# Patient Record
Sex: MALE | Race: WHITE | NOT HISPANIC OR LATINO | Employment: FULL TIME | URBAN - METROPOLITAN AREA
[De-identification: names, ages, dates, MRNs, and addresses within clinical notes are randomized per-mention and may not be internally consistent; named-entity substitution may affect disease eponyms.]

---

## 2017-01-05 ENCOUNTER — CLINICAL SUPPORT (OUTPATIENT)
Dept: REHABILITATION | Facility: HOSPITAL | Age: 47
End: 2017-01-05
Attending: FAMILY MEDICINE
Payer: COMMERCIAL

## 2017-01-05 DIAGNOSIS — M25.562 CHRONIC PAIN OF LEFT KNEE: Primary | ICD-10-CM

## 2017-01-05 DIAGNOSIS — G89.29 CHRONIC PAIN OF LEFT KNEE: Primary | ICD-10-CM

## 2017-01-05 PROCEDURE — 97110 THERAPEUTIC EXERCISES: CPT

## 2017-01-05 NOTE — PROGRESS NOTES
PT knee treatment   Physician:Dr. Hamilton/ Dr. Bills  Diagnosis: L shoulder pain   Encounter Diagnosis   Name Primary?    Left knee pain Yes      DOS/ PROCEDURE: 3/9/16  PROCEDURES:   1. Left knee anterior cruciate ligament reconstruction with ipsilateral bone-patellar tendon-bone autograft  Orders:  Physical Therapy evaluate and treat  Date of eval:  3/11/16, shoulder 4/12/16  DOS 3/9/16  Subjective:  Pt is w/o c/o knee pn, again states that it still just feels a little weak . Reports not doing hep consistently but then states he does. Not using stim at home.  Work:                                  Pts goals:  R/t soccer and work without shoulder pain.      OBJECTIVE:    ROM:     11/4  lachman's / ant drawer L neg  clarks + L  ITB tightness, lat patellar tilt  L > R quad weakness    12/ 13 Flex L 145 AROM , R 142   Ext L - 1      7/19  MMT:  Shoulder er  R 5, L     Hip abd L 5   Hip ext 5 ( tight hip flexors)    12/13 Biodex test 180deg/ sec QS L  72 % D        HS 16% D    300 deg/ sec QS 55 % D        HS + 40 % S    Peak Tq/ BW  180 deg/ sec Quads R 63,  L 17  Normal Men 180 deg/ sec: 65- 75  Normal Women 180 deg/ sec : 50- 60    Qs/ HS ratio R 50,  L  152  Normal 66- 76      TREATMENT:  Pt performed LE rom and strengthening as tolerated, including:   stat bike 10 min NP  Medial mcconnel patellar taping with pt reporting slight improvement in sx's and improved ability to contract quads  ellip 10 min     biodex retest np  jogging on turf 3 laps   HSS 2 min B    gss incline 2 min   Quad stretch NP  itb foam roll 15x np  Lat step mtb 60 ft   np  wall squat 3x fatigue    He sls  30x  10#   U squat 30x  NP  Hip flex 2.5 plates 30x  B leg press 140# 30x  calf press 140# 30x  U leg press 100# 30x  U lunge bench 30x  With ed to avoid trunk flexion NP  Lunge walk 2 lap    laq 30x [id number]  Hs hammer 30 # 30x    pilates chair hip step-up 30x 3 spg - ed to avoid using UEs  NP  Lat step ups 3 in 3 x f NP    prone ext hang  5 min # 5 NP  Bridge march 10 sec x 10 U  NP  Star lunge walk 1 lap OTB NP  Foam roll B ITB, HS, quad 15 each  PROM, patella/jt mobs x  10  min        Russian quad stim 10/20,  10 min with pt ed to activate quad with stim. NP      ASSESSMENT: Pt beryl tx with progression of ther ex well. no c/o pn.  Decreased strength L quad ,.seemed to have improved ability to activate quads and no pn with return to limited jogging. Tight L ITB.  Medical necessity is demonstrated by the following  IMPAIRMENTS:  Pain limits function of effected part for some activities, Unable to participate fully in daily activities, Requires skilled supervision to complete and progress HEP, Weakness and Edema    GOALS:   40_   weeks. Pt agrees with goals set  1. Independent with HEP.  2. Report decreased    L knee    pain  <   / =  2  /10 with adls such as jogging, walking, soccer (met for walking)  3. Increased MMT  for  L LE to 5/5    4. Increased arom  for  L knee wnl (met for flex)    5. Independent with HEP shoulder (MET)  6. Report decreased    L shoulder   pain  <   / =  3  /10 with adls such as sleeping, abduction arom/ working (MET)  7. Increased MMT  for  L shoulder   To 5/5 (met except mid traps)      PLAN:  Outpatient physical therapy 1- 2 times weekly to include: pt ed, hep, therapeutic exercises, neuromuscular re-education/ balance exercises, joint mobilizations, modalities prn, and aquatic therapy.    Pt may see PTA as part of treatment plan.  Cont PT for 9  weeks.

## 2017-01-10 ENCOUNTER — CLINICAL SUPPORT (OUTPATIENT)
Dept: REHABILITATION | Facility: HOSPITAL | Age: 47
End: 2017-01-10
Attending: FAMILY MEDICINE
Payer: COMMERCIAL

## 2017-01-10 DIAGNOSIS — G89.29 CHRONIC PAIN OF LEFT KNEE: Primary | ICD-10-CM

## 2017-01-10 DIAGNOSIS — M25.562 CHRONIC PAIN OF LEFT KNEE: Primary | ICD-10-CM

## 2017-01-10 PROCEDURE — 97110 THERAPEUTIC EXERCISES: CPT

## 2017-01-10 NOTE — PROGRESS NOTES
PT knee treatment   Physician:Dr. Hamilton/ Dr. Bills  Diagnosis: L shoulder pain   Encounter Diagnosis   Name Primary?    Left knee pain Yes      DOS/ PROCEDURE: 3/9/16  PROCEDURES:   1. Left knee anterior cruciate ligament reconstruction with ipsilateral bone-patellar tendon-bone autograft  Orders:  Physical Therapy evaluate and treat  Date of eval:  3/11/16, shoulder 4/12/16  DOS 3/9/16  Subjective:  Pt is w/o c/o knee pn, reports using stim at home.  Work:                                  Pts goals:  R/t soccer and work without shoulder pain.      OBJECTIVE:    ROM:     11/4  lachman's / ant drawer L neg  clarks + L  ITB tightness, lat patellar tilt  L > R quad weakness    12/ 13 Flex L 145 AROM , R 142   Ext L - 1      7/19  MMT:  Shoulder er  R 5, L     Hip abd L 5   Hip ext 5 ( tight hip flexors)    12/13 Biodex test 180deg/ sec QS L  72 % D        HS 16% D    300 deg/ sec QS 55 % D        HS + 40 % S    Peak Tq/ BW  180 deg/ sec Quads R 63,  L 17  Normal Men 180 deg/ sec: 65- 75  Normal Women 180 deg/ sec : 50- 60    Qs/ HS ratio R 50,  L  152  Normal 66- 76      TREATMENT:  Pt performed LE rom and strengthening as tolerated, including:   stat bike 10 min   Medial mcconnel patellar taping with pt reporting slight improvement in sx's and improved ability to contract quads  ellip 10 min   NP  biodex retest np  jogging on turf 6 laps   HSS 2 min B    gss incline 2 min   Quad stretch NP  itb foam roll 15x np  Lat step mtb 60 ft   np  wall squat 3x fatigue    He sls  30x  10#   U squat 30x  with sports cord  Hip flex 3 plates 30x  B leg press 140# 30x  calf press 140# 30x  U leg press 100# 30x  U lunge bench 30x  With ed to avoid trunk flexion NP  Lunge walk 2 lap  NP  laq 30x [id number]#  Hs hammer 35 # 30x    pilates chair hip step-up 30x 3 spg - ed to avoid using UEs    Lat step ups 3 in 3 x f NP   prone ext hang  5 min # 5 NP  Bridge march 10 sec x 10 U  NP  Star lunge walk 1 lap OTB NP  Foam  roll B ITB, HS, quad 15 each  PROM, patella/jt mobs x  10  min NP       Russian quad stim 10/20,  10 min with pt ed to activate quad with stim. NP      ASSESSMENT: Pt beryl tx with progression of ther ex well. no c/o pn.  Decreased strength L quad ,.seemed to have improved ability to activate quads and no pn with return to limited jogging. Tight L ITB.  Medical necessity is demonstrated by the following  IMPAIRMENTS:  Pain limits function of effected part for some activities, Unable to participate fully in daily activities, Requires skilled supervision to complete and progress HEP, Weakness and Edema    GOALS:   40_   weeks. Pt agrees with goals set  1. Independent with HEP.  2. Report decreased    L knee    pain  <   / =  2  /10 with adls such as jogging, walking, soccer (met for walking)  3. Increased MMT  for  L LE to 5/5    4. Increased arom  for  L knee wnl (met for flex)    5. Independent with HEP shoulder (MET)  6. Report decreased    L shoulder   pain  <   / =  3  /10 with adls such as sleeping, abduction arom/ working (MET)  7. Increased MMT  for  L shoulder   To 5/5 (met except mid traps)      PLAN:  Outpatient physical therapy 1- 2 times weekly to include: pt ed, hep, therapeutic exercises, neuromuscular re-education/ balance exercises, joint mobilizations, modalities prn, and aquatic therapy.    Pt may see PTA as part of treatment plan.  Cont PT for 9  weeks.

## 2017-01-12 ENCOUNTER — CLINICAL SUPPORT (OUTPATIENT)
Dept: REHABILITATION | Facility: HOSPITAL | Age: 47
End: 2017-01-12
Attending: FAMILY MEDICINE
Payer: COMMERCIAL

## 2017-01-12 DIAGNOSIS — G89.29 CHRONIC PAIN OF LEFT KNEE: Primary | ICD-10-CM

## 2017-01-12 DIAGNOSIS — M25.562 CHRONIC PAIN OF LEFT KNEE: Primary | ICD-10-CM

## 2017-01-12 PROCEDURE — 97110 THERAPEUTIC EXERCISES: CPT

## 2017-01-12 NOTE — PROGRESS NOTES
PT knee treatment   Physician:Dr. Hamilton/ Dr. Bills  Diagnosis: L shoulder pain   Encounter Diagnosis   Name Primary?    Left knee pain Yes      DOS/ PROCEDURE: 3/9/16  PROCEDURES:   1. Left knee anterior cruciate ligament reconstruction with ipsilateral bone-patellar tendon-bone autograft  Orders:  Physical Therapy evaluate and treat  Date of eval:  3/11/16, shoulder 4/12/16  DOS 3/9/16  Subjective:  Pt is w/o c/o knee pn, reports using stim at home.  Work:                                  Pts goals:  R/t soccer and work without shoulder pain.      OBJECTIVE:    ROM:     11/4  lachman's / ant drawer L neg  clarks + L  ITB tightness, lat patellar tilt  L > R quad weakness    12/ 13 Flex L 145 AROM , R 142   Ext L - 1      7/19  MMT:  Shoulder er  R 5, L     Hip abd L 5   Hip ext 5 ( tight hip flexors)    12/13 Biodex test 180deg/ sec QS L  72 % D        HS 16% D    300 deg/ sec QS 55 % D        HS + 40 % S    Peak Tq/ BW  180 deg/ sec Quads R 63,  L 17  Normal Men 180 deg/ sec: 65- 75  Normal Women 180 deg/ sec : 50- 60    Qs/ HS ratio R 50,  L  152  Normal 66- 76      TREATMENT:  Pt performed LE rom and strengthening as tolerated, including:   stat bike 10 min   Medial mcconnel patellar taping with pt reporting slight improvement in sx's and improved ability to contract quads  ellip 10 min     biodex retest np  jogging on turf 8 laps   HSS 2 min B    gss incline 2 min   Quad stretch NP  itb foam roll 15x np  Lat step mtb 60 ft   np  wall squat 3x fatigue    He sls  30x  10# NP  U squat 30x  with sports cord NP  Hip flex 3 plates 30x  B leg press 140# 30x  calf press 140# 30x  U leg press 100# 30x  U lunge bench 30x  With ed to avoid trunk flexion NP  Lunge walk 2 lap    laq 30x [id number]#  Hs hammer 35 # 30x    pilates chair hip step-up 30x 2 spg - ed to avoid using UEs   Plyo shuttle 1 band 2 up 1 down 30x   Lat step ups 3 in 3 x f NP   prone ext hang  5 min # 5 NP  Bridge march 10 sec x 10 U   NP  Star lunge walk 1 lap OTB NP  Foam roll B ITB, HS, quad 15 each  PROM, patella/jt mobs x  10  min NP       Russian quad stim 10/20,  10 min with pt ed to activate quad with stim. NP      ASSESSMENT: Pt beryl tx with progression of ther ex well. no c/o pn.  Decreased strength L quad ,.seemed to have improved ability to activate quads and no pn with return to limited jogging. Tight L ITB.  Medical necessity is demonstrated by the following  IMPAIRMENTS:  Pain limits function of effected part for some activities, Unable to participate fully in daily activities, Requires skilled supervision to complete and progress HEP, Weakness and Edema    GOALS:   40_   weeks. Pt agrees with goals set  1. Independent with HEP.  2. Report decreased    L knee    pain  <   / =  2  /10 with adls such as jogging, walking, soccer (met for walking)  3. Increased MMT  for  L LE to 5/5    4. Increased arom  for  L knee wnl (met for flex)    5. Independent with HEP shoulder (MET)  6. Report decreased    L shoulder   pain  <   / =  3  /10 with adls such as sleeping, abduction arom/ working (MET)  7. Increased MMT  for  L shoulder   To 5/5 (met except mid traps)      PLAN:  Outpatient physical therapy 1- 2 times weekly to include: pt ed, hep, therapeutic exercises, neuromuscular re-education/ balance exercises, joint mobilizations, modalities prn, and aquatic therapy.    Pt may see PTA as part of treatment plan.  Cont PT for 9  weeks.

## 2017-01-17 ENCOUNTER — CLINICAL SUPPORT (OUTPATIENT)
Dept: REHABILITATION | Facility: HOSPITAL | Age: 47
End: 2017-01-17
Attending: FAMILY MEDICINE
Payer: COMMERCIAL

## 2017-01-17 DIAGNOSIS — G89.29 CHRONIC PAIN OF LEFT KNEE: Primary | ICD-10-CM

## 2017-01-17 DIAGNOSIS — M25.562 CHRONIC PAIN OF LEFT KNEE: Primary | ICD-10-CM

## 2017-01-17 PROCEDURE — 97110 THERAPEUTIC EXERCISES: CPT

## 2017-01-17 NOTE — PROGRESS NOTES
PT knee treatment   Physician:Dr. Hamilton/ Dr. Bills  Diagnosis: L shoulder pain   Encounter Diagnosis   Name Primary?    Left knee pain Yes      DOS/ PROCEDURE: 3/9/16  PROCEDURES:   1. Left knee anterior cruciate ligament reconstruction with ipsilateral bone-patellar tendon-bone autograft  Orders:  Physical Therapy evaluate and treat  Date of eval:  3/11/16, shoulder 4/12/16  DOS 3/9/16  Subjective:  Pt is w/o c/o knee pn, reports using stim at home.  Work:                                  Pts goals:  R/t soccer and work without shoulder pain.      OBJECTIVE:    ROM:     11/4  lachman's / ant drawer L neg  clarks + L  ITB tightness, lat patellar tilt  L > R quad weakness    12/ 13 Flex L 145 AROM , R 142   Ext L - 1      7/19  MMT:  Shoulder er  R 5, L     Hip abd L 5   Hip ext 5 ( tight hip flexors)    12/13 Biodex test 180deg/ sec QS L  72 % D        HS 16% D    300 deg/ sec QS 55 % D        HS + 40 % S    Peak Tq/ BW  180 deg/ sec Quads R 63,  L 17  Normal Men 180 deg/ sec: 65- 75  Normal Women 180 deg/ sec : 50- 60    Qs/ HS ratio R 50,  L  152  Normal 66- 76      TREATMENT:  Pt performed LE rom and strengthening as tolerated, including:   stat bike 10 min NP  ellip 10 min     biodex retest np  jogging onTM 3 min with 1 min warm-up,  2 min cool down  HSS 2 min B    gss incline 2 min   Quad stretch NP  itb foam roll 15x np  Lat step mtb 60 ft   np  wall squat 3x fatigue    He sls  30x  10# NP  U squat 30x  with sports cord NP  Hip flex 3 plates 30x  B leg press 140# 30x  calf press 140# 30x  U leg press 100# 30x  U lunge bench 30x  With ed to avoid trunk flexion NP  Lunge walk 2 lap    laq 30x [id number]#  Hs hammer 35 # 30x    pilates chair hip step-up 30x 2 spg - ed to avoid using UEs   Plyo shuttle 1 band 2 up 1 down 30x   Lat step ups 3 in 3 x f NP   prone ext hang  5 min # 5 NP  Bridge march 10 sec x 10 U  NP  Star lunge walk 1 lap OTB NP  Foam roll B ITB, HS, quad 15 each  PROM, patella/jt  mobs x  10  min NP       Russian quad stim 10/20,  10 min with pt ed to activate quad with stim. NP      ASSESSMENT: Pt beryl tx with progression of ther ex well. no c/o pn. Beryl jogging on TM well with no adverse effects during or post.  Decreased strength L quad ,.seemed to have improved ability to activate quads and no pn with return to limited jogging. Tight L ITB.  Medical necessity is demonstrated by the following  IMPAIRMENTS:  Pain limits function of effected part for some activities, Unable to participate fully in daily activities, Requires skilled supervision to complete and progress HEP, Weakness and Edema    GOALS:   40_   weeks. Pt agrees with goals set  1. Independent with HEP.  2. Report decreased    L knee    pain  <   / =  2  /10 with adls such as jogging, walking, soccer (met for walking)  3. Increased MMT  for  L LE to 5/5    4. Increased arom  for  L knee wnl (met for flex)    5. Independent with HEP shoulder (MET)  6. Report decreased    L shoulder   pain  <   / =  3  /10 with adls such as sleeping, abduction arom/ working (MET)  7. Increased MMT  for  L shoulder   To 5/5 (met except mid traps)      PLAN:  Outpatient physical therapy 1- 2 times weekly to include: pt ed, hep, therapeutic exercises, neuromuscular re-education/ balance exercises, joint mobilizations, modalities prn, and aquatic therapy.    Pt may see PTA as part of treatment plan.  Cont PT for 9  weeks.

## 2017-01-19 ENCOUNTER — CLINICAL SUPPORT (OUTPATIENT)
Dept: REHABILITATION | Facility: HOSPITAL | Age: 47
End: 2017-01-19
Attending: FAMILY MEDICINE
Payer: COMMERCIAL

## 2017-01-19 DIAGNOSIS — M25.562 CHRONIC PAIN OF LEFT KNEE: Primary | ICD-10-CM

## 2017-01-19 DIAGNOSIS — G89.29 CHRONIC PAIN OF LEFT KNEE: Primary | ICD-10-CM

## 2017-01-19 PROCEDURE — 97110 THERAPEUTIC EXERCISES: CPT

## 2017-01-19 NOTE — PROGRESS NOTES
PT knee treatment   Physician:Dr. Hamilton/ Dr. Bills  Diagnosis: L shoulder pain   Encounter Diagnosis   Name Primary?    Left knee pain Yes      DOS/ PROCEDURE: 3/9/16  PROCEDURES:   1. Left knee anterior cruciate ligament reconstruction with ipsilateral bone-patellar tendon-bone autograft  Orders:  Physical Therapy evaluate and treat  Date of eval:  3/11/16, shoulder 4/12/16  DOS 3/9/16  Subjective:  Pt is w/o c/o knee pn, reports using stim at home.  Work:                                  Pts goals:  R/t soccer and work without shoulder pain.      OBJECTIVE:    ROM:     11/4  lachman's / ant drawer L neg  clarks + L  ITB tightness, lat patellar tilt  L > R quad weakness    12/ 13 Flex L 145 AROM , R 142   Ext L - 1      7/19  MMT:  Shoulder er  R 5, L     Hip abd L 5   Hip ext 5 ( tight hip flexors)    12/13 Biodex test 180deg/ sec QS L  72 % D        HS 16% D    300 deg/ sec QS 55 % D        HS + 40 % S    Peak Tq/ BW  180 deg/ sec Quads R 63,  L 17  Normal Men 180 deg/ sec: 65- 75  Normal Women 180 deg/ sec : 50- 60    Qs/ HS ratio R 50,  L  152  Normal 66- 76      TREATMENT:  Pt performed LE rom and strengthening as tolerated, including:   stat bike 10 min NP  ellip 10 min     biodex retest np  jogging onTM 3 min with 2 min warm-up,  2 min cool down  HSS 2 min B    gss incline 2 min   Quad stretch NP  itb foam roll 15x np  Lat step mtb 60 ft   np  wall squat 3x fatigue    He sls  30x  10# NP  TKE 20# 30x  U squat 30x  with sports cord NP  Hip flex 3.5 plates 30x  B leg press 140# 30x NP  calf press 140# 30x NP  U leg press 100# 30x NP  U lunge bench 30x  With ed to avoid trunk flexion NP  Lunge walk 2 lap  with ball  laq 30x [id number]  Hs hammer 35 # 30x    pilates chair hip step-up 30x 2 spg - ed to avoid using UEs   Plyo shuttle 2 band 2 up 1 down 30x   Lat step ups 3 in 3 x f NP   prone ext hang  5 min # 5 NP  Bridge march 10 sec x 10 U  NP  Star lunge walk 1 lap OTB NP  Foam roll B ITB, HS,  quad 15 each NP  PROM, patella/jt mobs x  10  min NP       Russian quad stim 10/20,  10 min with pt ed to activate quad with stim. NP      ASSESSMENT: Pt beryl tx with progression of ther ex well. no c/o pn. Beryl jogging on TM well with no adverse effects during or post.  Decreased strength L quad ,.seemed to have improved ability to activate quads and no pn with return to jogging and plyo exs today. Tight L ITB.  Medical necessity is demonstrated by the following  IMPAIRMENTS:  Pain limits function of effected part for some activities, Unable to participate fully in daily activities, Requires skilled supervision to complete and progress HEP, Weakness and Edema    GOALS:   40_   weeks. Pt agrees with goals set  1. Independent with HEP.  2. Report decreased    L knee    pain  <   / =  2  /10 with adls such as jogging, walking, soccer (met for walking)  3. Increased MMT  for  L LE to 5/5    4. Increased arom  for  L knee wnl (met for flex)    5. Independent with HEP shoulder (MET)  6. Report decreased    L shoulder   pain  <   / =  3  /10 with adls such as sleeping, abduction arom/ working (MET)  7. Increased MMT  for  L shoulder   To 5/5 (met except mid traps)      PLAN:  Outpatient physical therapy 1- 2 times weekly to include: pt ed, hep, therapeutic exercises, neuromuscular re-education/ balance exercises, joint mobilizations, modalities prn, and aquatic therapy.    Pt may see PTA as part of treatment plan.  Cont PT for 9  weeks.

## 2017-01-24 ENCOUNTER — CLINICAL SUPPORT (OUTPATIENT)
Dept: REHABILITATION | Facility: HOSPITAL | Age: 47
End: 2017-01-24
Attending: FAMILY MEDICINE
Payer: COMMERCIAL

## 2017-01-24 DIAGNOSIS — M25.562 CHRONIC PAIN OF LEFT KNEE: Primary | ICD-10-CM

## 2017-01-24 DIAGNOSIS — G89.29 CHRONIC PAIN OF LEFT KNEE: Primary | ICD-10-CM

## 2017-01-24 PROCEDURE — 97750 PHYSICAL PERFORMANCE TEST: CPT | Performed by: INTERNAL MEDICINE

## 2017-01-24 PROCEDURE — 97110 THERAPEUTIC EXERCISES: CPT | Performed by: INTERNAL MEDICINE

## 2017-01-24 NOTE — PROGRESS NOTES
PT knee treatment   Physician:Dr. Hamilton/ Dr. Bills  Diagnosis: L shoulder pain   Encounter Diagnosis   Name Primary?    Left knee pain Yes      DOS/ PROCEDURE: 3/9/16  PROCEDURES:   1. Left knee anterior cruciate ligament reconstruction with ipsilateral bone-patellar tendon-bone autograft  Orders:  Physical Therapy evaluate and treat  Date of eval:  3/11/16, shoulder 4/12/16  DOS 3/9/16  Subjective:  Pt is w/o c/o knee pn, feels like quad strength is improving.  Work:                                  Pts goals:  R/t soccer and work without shoulder pain.      OBJECTIVE:    ROM:     11/4  lachman's / ant drawer L neg  clarks + L  ITB tightness, lat patellar tilt  L > R quad weakness    12/ 13 Flex L 145 AROM , R 142   Ext L - 1      7/19  MMT:  Shoulder er  R 5, L     Hip abd L 5   Hip ext 5 ( tight hip flexors)    1/24 Biodex test 180deg/ sec QS L  64% D        HS 6% D    300 deg/ sec QS 45 % D        HS + 34 % S    Peak Tq/ BW  180 deg/ sec Quads R 50,  L 18  Normal Men 180 deg/ sec: 65- 75  Normal Women 180 deg/ sec : 50- 60    Qs/ HS ratio R 56,  L  151  Normal 66- 76      TREATMENT:  Pt performed LE rom and strengthening as tolerated, including:   stat bike 10 min   ellip 10 min   np  biodex retest   jogging onTM 3 min with 2 min warm-up,  2 min cool down  HSS 2 min B    gss incline 2 min   Quad stretch   wall squat 3x fatigue  Heel down  He sls  30x  10# NP  TKE 40# 30x  U squat 30x  with sports cord NP  Hip flex 3.5 plates 30x np  U leg press 100# 30x NP  U lunge bench 30x  With ed to avoid trunk flexion 10 # B  Lunge walk 2 lap  with ball np  laq 30x [id number]  Hs hammer 35 # 30x  np  pilates chair hip step-up 30x 2 spg - ed to avoid using UEs  np  Plyo shuttle 2 band 2 up 1 down 30x np  Lat step ups 4 in 3 x f    prone ext hang  5 min # 5 NP  Bridge march 10 sec x 10 U  NP  Foam roll B ITB, HS, quad 15 each        Ed in metronome 180 bpm and to cont jogging < 10 min as beryl due to cont quad  weakness. Ed to cont to avoid sports.    ASSESSMENT: Pt beryl tx with progression of ther ex well. no c/o pn. Improved L quad strength/ endurance but remains very weak. Medical necessity is demonstrated by the following  IMPAIRMENTS:  Pain limits function of effected part for some activities, Unable to participate fully in daily activities, Requires skilled supervision to complete and progress HEP, Weakness and Edema    GOALS:   40_   weeks. Pt agrees with goals set  1. Independent with HEP.  2. Report decreased    L knee    pain  <   / =  2  /10 with adls such as jogging, walking, soccer (met for walking)  3. Increased MMT  for  L LE to 5/5    4. Increased arom  for  L knee wnl (met for flex)    5. Independent with HEP shoulder (MET)  6. Report decreased    L shoulder   pain  <   / =  3  /10 with adls such as sleeping, abduction arom/ working (MET)  7. Increased MMT  for  L shoulder   To 5/5 (met except mid traps)  8. Biodex L quad strength improved to 20 % Deficit      PLAN:  Outpatient physical therapy 1- 2 times weekly to include: pt ed, hep, therapeutic exercises, neuromuscular re-education/ balance exercises, joint mobilizations, modalities prn, and aquatic therapy.    Pt may see PTA as part of treatment plan.  Cont PT for 15  weeks.     PT/PTA met face to face to discuss patient's treatment plan and progress towards established goals.  Treatment will be continued as described in initial report/eval and progress notes.  Patient will be seen by physical therapist every sixth visit and minimally once per month.        Robert MENDOZA

## 2017-01-31 ENCOUNTER — CLINICAL SUPPORT (OUTPATIENT)
Dept: REHABILITATION | Facility: HOSPITAL | Age: 47
End: 2017-01-31
Attending: FAMILY MEDICINE
Payer: COMMERCIAL

## 2017-01-31 DIAGNOSIS — G89.29 CHRONIC PAIN OF LEFT KNEE: Primary | ICD-10-CM

## 2017-01-31 DIAGNOSIS — M25.562 CHRONIC PAIN OF LEFT KNEE: Primary | ICD-10-CM

## 2017-01-31 PROCEDURE — 97110 THERAPEUTIC EXERCISES: CPT

## 2017-01-31 NOTE — PROGRESS NOTES
PT knee treatment   Physician:Dr. Hamilton/ Dr. Bills  Diagnosis: L shoulder pain   Encounter Diagnosis   Name Primary?    Left knee pain Yes      DOS/ PROCEDURE: 3/9/16  PROCEDURES:   1. Left knee anterior cruciate ligament reconstruction with ipsilateral bone-patellar tendon-bone autograft  Orders:  Physical Therapy evaluate and treat  Date of eval:  3/11/16, shoulder 4/12/16  DOS 3/9/16  Subjective:  Pt is w/o c/o knee pn, reports using stim at home.  Work:                                  Pts goals:  R/t soccer and work without shoulder pain.      OBJECTIVE:    ROM:     11/4  lachman's / ant drawer L neg  clarks + L  ITB tightness, lat patellar tilt  L > R quad weakness    12/ 13 Flex L 145 AROM , R 142   Ext L - 1      7/19  MMT:  Shoulder er  R 5, L     Hip abd L 5   Hip ext 5 ( tight hip flexors)    12/13 Biodex test 180deg/ sec QS L  72 % D        HS 16% D    300 deg/ sec QS 55 % D        HS + 40 % S    Peak Tq/ BW  180 deg/ sec Quads R 63,  L 17  Normal Men 180 deg/ sec: 65- 75  Normal Women 180 deg/ sec : 50- 60    Qs/ HS ratio R 50,  L  152  Normal 66- 76      TREATMENT:  Pt performed LE rom and strengthening as tolerated, including:   stat bike 10 min NP  ellip 10 min     biodex retest np  jogging onTM 3 min with 2 min warm-up,  2 min cool down  HSS 2 min B    gss incline 2 min   Quad stretch NP  itb foam roll 15x np  Lat step mtb 60 ft     wall squat 3x fatigue    He sls  30x  10# NP  TKE 20# 30x  U squat 30x  with sports cord NP  Hip flex 3.5 plates 30x  B leg press 140# 30x NP  calf press 140# 30x NP  U leg press 100# 30x NP  U lunge bench 30x  With ed to avoid trunk flexion NP  Lunge walk 2 lap  with ball  laq 30x [id number]  Hs hammer 30 # 30x    pilates chair hip step-up 30x 2 spg - ed to avoid using UEs NP  Plyo shuttle 1 band 2 up 1 down 30x   Lat step ups 3 in 3 x f NP   prone ext hang  5 min # 5 NP  Bridge march 10 sec x 10 U  NP  Star lunge walk 1 lap OTB NP  Foam roll B ITB, HS,  quad 15 each NP  PROM, patella/jt mobs x  10  min NP       Russian quad stim 10/20,  10 min with pt ed to activate quad with stim. NP      ASSESSMENT: Pt beryl tx with progression of ther ex well. no c/o pn. Beryl jogging on TM well with no adverse effects during or post.  Decreased strength L quad ,.seemed to have improved ability to activate quads and no pn with return to jogging and plyo exs today. Tight L ITB.  Medical necessity is demonstrated by the following  IMPAIRMENTS:  Pain limits function of effected part for some activities, Unable to participate fully in daily activities, Requires skilled supervision to complete and progress HEP, Weakness and Edema    GOALS:   40_   weeks. Pt agrees with goals set  1. Independent with HEP.  2. Report decreased    L knee    pain  <   / =  2  /10 with adls such as jogging, walking, soccer (met for walking)  3. Increased MMT  for  L LE to 5/5    4. Increased arom  for  L knee wnl (met for flex)    5. Independent with HEP shoulder (MET)  6. Report decreased    L shoulder   pain  <   / =  3  /10 with adls such as sleeping, abduction arom/ working (MET)  7. Increased MMT  for  L shoulder   To 5/5 (met except mid traps)      PLAN:  Outpatient physical therapy 1- 2 times weekly to include: pt ed, hep, therapeutic exercises, neuromuscular re-education/ balance exercises, joint mobilizations, modalities prn, and aquatic therapy.    Pt may see PTA as part of treatment plan.  Cont PT for 9  weeks.

## 2017-02-07 ENCOUNTER — CLINICAL SUPPORT (OUTPATIENT)
Dept: REHABILITATION | Facility: HOSPITAL | Age: 47
End: 2017-02-07
Attending: FAMILY MEDICINE
Payer: COMMERCIAL

## 2017-02-07 DIAGNOSIS — M25.562 CHRONIC PAIN OF LEFT KNEE: Primary | ICD-10-CM

## 2017-02-07 DIAGNOSIS — G89.29 CHRONIC PAIN OF LEFT KNEE: Primary | ICD-10-CM

## 2017-02-07 PROCEDURE — 97110 THERAPEUTIC EXERCISES: CPT

## 2017-02-07 NOTE — PROGRESS NOTES
PT knee treatment   Physician:Dr. Hamilton/ Dr. Bills  Diagnosis: L shoulder pain   Encounter Diagnosis   Name Primary?    Left knee pain Yes      DOS/ PROCEDURE: 3/9/16  PROCEDURES:   1. Left knee anterior cruciate ligament reconstruction with ipsilateral bone-patellar tendon-bone autograft  Orders:  Physical Therapy evaluate and treat  Date of eval:  3/11/16, shoulder 4/12/16  DOS 3/9/16  Subjective:  Pt is w/o c/o knee pn,.  Work:                                  Pts goals:  R/t soccer and work without shoulder pain.      OBJECTIVE:    ROM:     11/4  lachman's / ant drawer L neg  clarks + L  ITB tightness, lat patellar tilt  L > R quad weakness    12/ 13 Flex L 145 AROM , R 142   Ext L - 1      7/19  MMT:  Shoulder er  R 5, L     Hip abd L 5   Hip ext 5 ( tight hip flexors)    12/13 Biodex test 180deg/ sec QS L  72 % D        HS 16% D    300 deg/ sec QS 55 % D        HS + 40 % S    Peak Tq/ BW  180 deg/ sec Quads R 63,  L 17  Normal Men 180 deg/ sec: 65- 75  Normal Women 180 deg/ sec : 50- 60    Qs/ HS ratio R 50,  L  152  Normal 66- 76      TREATMENT:  Pt performed LE rom and strengthening as tolerated, including:   stat bike 10 min   ellip 10 min   NP  biodex retest np  jogging onTM 4 min with 2 min warm-up,  2 min cool down  HSS 2 min B    gss incline 2 min   Quad stretch NP  itb foam roll 15x np  Lat step mtb 60 ft     wall squat 3x fatigue    He sls  30x  10# NP  TKE 20# 30x  U squat 30x  with sports cord   Hip flex 3.5 plates 30x  B leg press 140# 30x NP  calf press 140# 30x NP  U leg press 100# 30x NP  U lunge bench 30x  With ed to avoid trunk flexion NP  Lunge walk 2 lap  with ball  laq 30x [id number]  Hs hammer 35 # 30x    pilates chair hip step-up 30x 2 spg - ed to avoid using UEs   Plyo shuttle 1 band 2 up 1 down 30x NP  Lat step ups 3 in 3 x 10   prone ext hang  5 min # 5 NP  Bridge march 10 sec x 10 U  NP  Star lunge walk 1 lap OTB NP  Foam roll B ITB, HS, quad 15 each NP  PROM,  patella/jt mobs x  10  min NP       Russian quad stim 10/20,  10 min with pt ed to activate quad with stim. NP      ASSESSMENT: Pt beryl tx with progression of ther ex well. no c/o pn. Ebryl jogging on TM well with no adverse effects during or post.  Decreased strength L quad ,.seemed to have improved ability to activate quads and no pn with return to jogging and plyo exs today. Tight L ITB.  Medical necessity is demonstrated by the following  IMPAIRMENTS:  Pain limits function of effected part for some activities, Unable to participate fully in daily activities, Requires skilled supervision to complete and progress HEP, Weakness and Edema    GOALS:   40_   weeks. Pt agrees with goals set  1. Independent with HEP.  2. Report decreased    L knee    pain  <   / =  2  /10 with adls such as jogging, walking, soccer (met for walking)  3. Increased MMT  for  L LE to 5/5    4. Increased arom  for  L knee wnl (met for flex)    5. Independent with HEP shoulder (MET)  6. Report decreased    L shoulder   pain  <   / =  3  /10 with adls such as sleeping, abduction arom/ working (MET)  7. Increased MMT  for  L shoulder   To 5/5 (met except mid traps)      PLAN:  Outpatient physical therapy 1- 2 times weekly to include: pt ed, hep, therapeutic exercises, neuromuscular re-education/ balance exercises, joint mobilizations, modalities prn, and aquatic therapy.    Pt may see PTA as part of treatment plan.  Cont PT for 9  weeks.

## 2017-02-09 ENCOUNTER — CLINICAL SUPPORT (OUTPATIENT)
Dept: REHABILITATION | Facility: HOSPITAL | Age: 47
End: 2017-02-09
Attending: FAMILY MEDICINE
Payer: COMMERCIAL

## 2017-02-09 DIAGNOSIS — M25.562 CHRONIC PAIN OF LEFT KNEE: Primary | ICD-10-CM

## 2017-02-09 DIAGNOSIS — G89.29 CHRONIC PAIN OF LEFT KNEE: Primary | ICD-10-CM

## 2017-02-09 PROCEDURE — 97110 THERAPEUTIC EXERCISES: CPT

## 2017-02-09 NOTE — PROGRESS NOTES
PT knee treatment   Physician:Dr. Hamilton/ Dr. Bills  Diagnosis: L shoulder pain   Encounter Diagnosis   Name Primary?    Left knee pain Yes      DOS/ PROCEDURE: 3/9/16  PROCEDURES:   1. Left knee anterior cruciate ligament reconstruction with ipsilateral bone-patellar tendon-bone autograft  Orders:  Physical Therapy evaluate and treat  Date of eval:  3/11/16, shoulder 4/12/16  DOS 3/9/16  Subjective:  Pt is w/o c/o knee pn,.  Work:                                  Pts goals:  R/t soccer and work without shoulder pain.      OBJECTIVE:    ROM:     11/4  lachman's / ant drawer L neg  clarks + L  ITB tightness, lat patellar tilt  L > R quad weakness    12/ 13 Flex L 145 AROM , R 142   Ext L - 1      7/19  MMT:  Shoulder er  R 5, L     Hip abd L 5   Hip ext 5 ( tight hip flexors)    12/13 Biodex test 180deg/ sec QS L  72 % D        HS 16% D    300 deg/ sec QS 55 % D        HS + 40 % S    Peak Tq/ BW  180 deg/ sec Quads R 63,  L 17  Normal Men 180 deg/ sec: 65- 75  Normal Women 180 deg/ sec : 50- 60    Qs/ HS ratio R 50,  L  152  Normal 66- 76      TREATMENT:  Pt performed LE rom and strengthening as tolerated, including:   stat bike 10 min NP  ellip 10 min     biodex retest np  jogging onTM 4 min with 2 min warm-up,  2 min cool down NP  HSS 2 min B    gss incline 2 min   Quad stretch NP  itb foam roll 15x np  Lat step mtb 60 ft     wall squat 3x fatigue    He sls  30x  10#   TKE 23# 30x  U squat 30x  with sports cord NP  Hip flex 3.5 plates 30x NP  B leg press 140# 30x   calf press 140# 30x   U leg press 100# 30x   U lunge bench 30x  With ed to avoid trunk flexion NP  Lunge walk 2 lap  with ball NP  laq 30x [id number]  Hs hammer 35 # 30x    pilates chair hip step-up 30x 2 spg - ed to avoid using UEs NP  Plyo shuttle 1 band 2 up 1 down 30x NP  Lat step ups 6 in 3 x 10   prone ext hang  5 min # 5 NP  Bridge march 10 sec x 10 U  NP  Star lunge walk 1 lap OTB NP  Foam roll B ITB, HS, quad 15 each NP  PROM,  patella/jt mobs x  10  min NP       Russian quad stim 10/20,  10 min with pt ed to activate quad with stim. NP      ASSESSMENT: Pt beryl tx with progression of ther ex well. no c/o pn. Beryl jogging on TM well with no adverse effects during or post.  Decreased strength L quad ,.seemed to have improved ability to activate quads and no pn with return to jogging and plyo exs today. Tight L ITB.  Medical necessity is demonstrated by the following  IMPAIRMENTS:  Pain limits function of effected part for some activities, Unable to participate fully in daily activities, Requires skilled supervision to complete and progress HEP, Weakness and Edema    GOALS:   40_   weeks. Pt agrees with goals set  1. Independent with HEP.  2. Report decreased    L knee    pain  <   / =  2  /10 with adls such as jogging, walking, soccer (met for walking)  3. Increased MMT  for  L LE to 5/5    4. Increased arom  for  L knee wnl (met for flex)    5. Independent with HEP shoulder (MET)  6. Report decreased    L shoulder   pain  <   / =  3  /10 with adls such as sleeping, abduction arom/ working (MET)  7. Increased MMT  for  L shoulder   To 5/5 (met except mid traps)      PLAN:  Outpatient physical therapy 1- 2 times weekly to include: pt ed, hep, therapeutic exercises, neuromuscular re-education/ balance exercises, joint mobilizations, modalities prn, and aquatic therapy.    Pt may see PTA as part of treatment plan.  Cont PT for 9  weeks.

## 2017-02-14 ENCOUNTER — CLINICAL SUPPORT (OUTPATIENT)
Dept: REHABILITATION | Facility: HOSPITAL | Age: 47
End: 2017-02-14
Attending: FAMILY MEDICINE
Payer: COMMERCIAL

## 2017-02-14 DIAGNOSIS — M25.562 CHRONIC PAIN OF LEFT KNEE: Primary | ICD-10-CM

## 2017-02-14 DIAGNOSIS — G89.29 CHRONIC PAIN OF LEFT KNEE: Primary | ICD-10-CM

## 2017-02-14 PROCEDURE — 97110 THERAPEUTIC EXERCISES: CPT | Performed by: INTERNAL MEDICINE

## 2017-02-14 PROCEDURE — 97112 NEUROMUSCULAR REEDUCATION: CPT | Performed by: INTERNAL MEDICINE

## 2017-02-14 NOTE — PROGRESS NOTES
PT knee treatment   Physician:Dr. Hamilton/ Dr. Bills  Diagnosis: L shoulder pain   Encounter Diagnosis   Name Primary?    Left knee pain Yes      DOS/ PROCEDURE: 3/9/16  PROCEDURES:   1. Left knee anterior cruciate ligament reconstruction with ipsilateral bone-patellar tendon-bone autograft  Orders:  Physical Therapy evaluate and treat  Date of eval:  3/11/16, shoulder 4/12/16  DOS 3/9/16  Subjective:  Pt is w/o c/o knee pn, states diff doing step ups at home since his stairs are higher.  Work:                                  Pts goals:  R/t soccer and work without shoulder pain.      OBJECTIVE:    1/24 Biodex test 180deg/ sec QS L  64% D        HS 6% D    300 deg/ sec QS 45 % D        HS + 34 % S    Peak Tq/ BW  180 deg/ sec Quads R 50,  L 18  Normal Men 180 deg/ sec: 65- 75  Normal Women 180 deg/ sec : 50- 60    Qs/ HS ratio R 56,  L  151  Normal 66- 76          11/4  lachman's / ant drawer L neg  clarks + L  ITB tightness, lat patellar tilt  L > R quad weakness    2/14   Ext L + 2, R + 4    flex B knee arom wnl    Hip abd L 5   Hip ext 5 ( tight hip flexors)      TREATMENT:  Pt performed LE rom and strengthening as tolerated, including:   stat bike 10 min   ellip 10 min   np  biodex retest np  jogging onTM 4 min with 2 min warm-up,  2 min cool down NP  HSS 2 min B    gss incline 2 min   Prone flex strap 2 min  itb foam roll 15x np  Lat step mtb 70 ft     wall squat 3x fatigue    He sls  30x  10# np  TKE 23# 30x  U squat 30x  with sports cord NP  U leg press 100# 30x   U lunge bench 30x  With ed to avoid trunk flexion 5# each UE  Lunge walk 2 lap  with ball NP  laq 30x [id number]  Hs hammer 35 # 30x    pilates chair hip step-up 30x 2 spg - ed to avoid using UEs NP  Plyo shuttle 1 LE jump 15 x 2  Lat step ups 6 in 3 x 10   prone ext hang  5 min # 5  Bridge march 10 sec x 10 U  NP  Kneeling knee flex stre 20 sec x 3    Foam roll B ITB, HS, quad 15 each NP  PROM, patella/jt mobs x  10  min NP     Ed to  add 2 -3 in book at bottom of steps to make his step ups lower. Pt verbalized  understanding.     ASSESSMENT: Seems to be progressing with quad strength.     Medical necessity is demonstrated by the following  IMPAIRMENTS:  Pain limits function of effected part for some activities, Unable to participate fully in daily activities, Requires skilled supervision to complete and progress HEP, Weakness and Edema    GOALS:   40_   weeks. Pt agrees with goals set  1. Independent with HEP.  2. Report decreased    L knee    pain  <   / =  2  /10 with adls such as jogging, walking, soccer (met for walking)  3. Increased MMT  for  L LE to 5/5    4. Increased arom  for  L knee wnl (met for flex)    5. Independent with HEP shoulder (MET)  6. Report decreased    L shoulder   pain  <   / =  3  /10 with adls such as sleeping, abduction arom/ working (MET)  7. Increased MMT  for  L shoulder   To 5/5 (met except mid traps)      PLAN:  Outpatient physical therapy 1- 2 times weekly to include: pt ed, hep, therapeutic exercises, neuromuscular re-education/ balance exercises, joint mobilizations, modalities prn, and aquatic therapy.    Pt may see PTA as part of treatment plan.  Cont PT for 9  weeks.

## 2017-02-16 ENCOUNTER — CLINICAL SUPPORT (OUTPATIENT)
Dept: REHABILITATION | Facility: HOSPITAL | Age: 47
End: 2017-02-16
Attending: FAMILY MEDICINE
Payer: COMMERCIAL

## 2017-02-16 DIAGNOSIS — M25.562 CHRONIC PAIN OF LEFT KNEE: Primary | ICD-10-CM

## 2017-02-16 DIAGNOSIS — G89.29 CHRONIC PAIN OF LEFT KNEE: Primary | ICD-10-CM

## 2017-02-16 PROCEDURE — 97112 NEUROMUSCULAR REEDUCATION: CPT | Performed by: INTERNAL MEDICINE

## 2017-02-16 PROCEDURE — 97110 THERAPEUTIC EXERCISES: CPT | Performed by: INTERNAL MEDICINE

## 2017-02-17 NOTE — PROGRESS NOTES
PT knee treatment   Physician:Dr. Hamilton/ Dr. Bills  Diagnosis: L shoulder pain   Encounter Diagnosis   Name Primary?    Left knee pain Yes      DOS/ PROCEDURE: 3/9/16  PROCEDURES:   1. Left knee anterior cruciate ligament reconstruction with ipsilateral bone-patellar tendon-bone autograft  Orders:  Physical Therapy evaluate and treat  Date of eval:  3/11/16, shoulder 4/12/16  DOS 3/9/16  Subjective:  Pt is w/o c/o knee pn, states cont diff feeling some ex in his quad vs gluts/ hs.  Work:                                  Pts goals:  R/t soccer and work without shoulder pain.      OBJECTIVE:    1/24 Biodex test 180deg/ sec QS L  64% D        HS 6% D    300 deg/ sec QS 45 % D        HS + 34 % S    Peak Tq/ BW  180 deg/ sec Quads R 50,  L 18  Normal Men 180 deg/ sec: 65- 75  Normal Women 180 deg/ sec : 50- 60    Qs/ HS ratio R 56,  L  151  Normal 66- 76          11/4  lachman's / ant drawer L neg  clarks + L  ITB tightness, lat patellar tilt  L > R quad weakness    2/14   Ext L + 2, R + 4    flex B knee arom wnl    Hip abd L 5   Hip ext 5 ( tight hip flexors)      TREATMENT:  Pt performed LE rom and strengthening as tolerated, including:   stat bike 10 min   ellip 10 min   np  biodex retest np  jogging onTM 4 min with 2 min warm-up,  2 min cool down NP  HSS 2 min B    gss incline 2 min   Prone flex strap 2 min  itb foam roll 15x np  Lat step mtb 70 ft     wall squat 3x fatigue    He sls  30x  10# np  TKE 23# 30x np  slr mr 15x  saq 10 # 3x 10  U squat 30x  with sports cord NP  U leg press 100# 30x - unable to activate quads  U lunge bench 30x  With ed to avoid trunk flexion- no weight today  Lunge walk 2 lap  with ball NP  laq 30x [id number]  Hs hammer 35 # 30x    pilates chair hip step-up 30x 2 spg - ed to avoid using UEs NP  Plyo shuttle 1 LE jump 15 x 2  Lat step ups 6 in 3 x 10   prone ext hang  5 min # 5  Bridge march 10 sec x 10 U  NP  Kneeling knee flex stre 20 sec x 3 np    Foam roll B ITB, HS,  quad 15 each NP  PROM, patella/jt mobs x  10  min NP     Cont to need vc to activate quads with ex.  ASSESSMENT: Seems to be progressing with quad strength even though he cont with diff activating quads.    Medical necessity is demonstrated by the following  IMPAIRMENTS:  Pain limits function of effected part for some activities, Unable to participate fully in daily activities, Requires skilled supervision to complete and progress HEP, Weakness and Edema    GOALS:   40_   weeks. Pt agrees with goals set  1. Independent with HEP.  2. Report decreased    L knee    pain  <   / =  2  /10 with adls such as jogging, walking, soccer (met for walking)  3. Increased MMT  for  L LE to 5/5    4. Increased arom  for  L knee wnl (met for flex)    5. Independent with HEP shoulder (MET)  6. Report decreased    L shoulder   pain  <   / =  3  /10 with adls such as sleeping, abduction arom/ working (MET)  7. Increased MMT  for  L shoulder   To 5/5 (met except mid traps)      PLAN:  Outpatient physical therapy 1- 2 times weekly to include: pt ed, hep, therapeutic exercises, neuromuscular re-education/ balance exercises, joint mobilizations, modalities prn, and aquatic therapy.    Pt may see PTA as part of treatment plan.  Cont PT for 9  weeks.

## 2017-02-21 ENCOUNTER — CLINICAL SUPPORT (OUTPATIENT)
Dept: REHABILITATION | Facility: HOSPITAL | Age: 47
End: 2017-02-21
Attending: FAMILY MEDICINE
Payer: COMMERCIAL

## 2017-02-21 DIAGNOSIS — G89.29 CHRONIC PAIN OF LEFT KNEE: Primary | ICD-10-CM

## 2017-02-21 DIAGNOSIS — M25.562 CHRONIC PAIN OF LEFT KNEE: Primary | ICD-10-CM

## 2017-02-21 PROCEDURE — 97112 NEUROMUSCULAR REEDUCATION: CPT | Performed by: INTERNAL MEDICINE

## 2017-02-21 PROCEDURE — 97110 THERAPEUTIC EXERCISES: CPT | Performed by: INTERNAL MEDICINE

## 2017-02-21 NOTE — PROGRESS NOTES
PT knee treatment   Physician:Dr. Hamilton/ Dr. Bills  Diagnosis: L shoulder pain   Encounter Diagnosis   Name Primary?    Left knee pain Yes      DOS/ PROCEDURE: 3/9/16  PROCEDURES:   1. Left knee anterior cruciate ligament reconstruction with ipsilateral bone-patellar tendon-bone autograft  Orders:  Physical Therapy evaluate and treat  Date of eval:  3/11/16, shoulder 4/12/16  DOS 3/9/16  Subjective:  Pt is w/o c/o knee pn  Work:                                  Pts goals:  R/t soccer and work without shoulder pain.      OBJECTIVE:    1/24 Biodex test 180deg/ sec QS L  64% D        HS 6% D    300 deg/ sec QS 45 % D        HS + 34 % S    Peak Tq/ BW  180 deg/ sec Quads R 50,  L 18  Normal Men 180 deg/ sec: 65- 75  Normal Women 180 deg/ sec : 50- 60    Qs/ HS ratio R 56,  L  151  Normal 66- 76          11/4  lachman's / ant drawer L neg  clarks + L  ITB tightness, lat patellar tilt  L > R quad weakness    2/14   Ext L + 2, R + 4    flex B knee arom wnl    Hip abd L 5   Hip ext 5 ( tight hip flexors)      TREATMENT:  Pt performed LE rom and strengthening as tolerated, including:   stat bike 10 min   ellip 10 min   np  biodex retest np  jogging onTM 4 min with 2 min warm-up,  2 min cool down NP  HSS 2 min B    gss incline 2 min   Prone flex strap 2 min  itb foam roll 15x np  Lat step mtb 70 ft     wall squat 3x fatigue  Heel down  TKE 27# 30x   slr mr 15x  laq MR 2 x 10  saq 10 # 3x 10 np  U squat 30x  with sports cord NP  U leg press 100# 30x - unable to activate quads np  U lunge bench 30x  With ed to avoid trunk flexion- no weight today   laq 30x hammer 15# slow/ reg   Hs hammer 30 # 30x    pilates chair hip step-up 30x 2 spg - ed to avoid using UEs NP  Plyo shuttle 1 LE jump 15 x 2 np  Lat step ups 6 in 3 x 10   prone ext hang  5 min # 5  Bridge march 10 sec x 10 U    Kneeling knee flex stre 20 sec x 3 np      PROM, patella/jt mobs x  10  min NP     Cont to need vc to activate quads with  ex.  ASSESSMENT: Seems to be progressing with quad strength even though he cont with diff activating quads.    Medical necessity is demonstrated by the following  IMPAIRMENTS:  Pain limits function of effected part for some activities, Unable to participate fully in daily activities, Requires skilled supervision to complete and progress HEP, Weakness and Edema    GOALS:   40_   weeks. Pt agrees with goals set  1. Independent with HEP.  2. Report decreased    L knee    pain  <   / =  2  /10 with adls such as jogging, walking, soccer (met for walking)  3. Increased MMT  for  L LE to 5/5    4. Increased arom  for  L knee wnl (met for flex)    5. Independent with HEP shoulder (MET)  6. Report decreased    L shoulder   pain  <   / =  3  /10 with adls such as sleeping, abduction arom/ working (MET)  7. Increased MMT  for  L shoulder   To 5/5 (met except mid traps)      PLAN:  Outpatient physical therapy 1- 2 times weekly to include: pt ed, hep, therapeutic exercises, neuromuscular re-education/ balance exercises, joint mobilizations, modalities prn.    Pt may see PTA as part of treatment plan.  Cont PT for 9  weeks.

## 2017-03-07 ENCOUNTER — CLINICAL SUPPORT (OUTPATIENT)
Dept: REHABILITATION | Facility: HOSPITAL | Age: 47
End: 2017-03-07
Attending: FAMILY MEDICINE
Payer: COMMERCIAL

## 2017-03-07 DIAGNOSIS — G89.29 CHRONIC PAIN OF LEFT KNEE: Primary | ICD-10-CM

## 2017-03-07 DIAGNOSIS — M25.562 CHRONIC PAIN OF LEFT KNEE: Primary | ICD-10-CM

## 2017-03-07 PROCEDURE — 97110 THERAPEUTIC EXERCISES: CPT

## 2017-03-07 NOTE — PROGRESS NOTES
PT knee treatment   Physician:Dr. Hamilton/ Dr. Bills  Diagnosis: L shoulder pain   Encounter Diagnosis   Name Primary?    Left knee pain Yes      DOS/ PROCEDURE: 3/9/16  PROCEDURES:   1. Left knee anterior cruciate ligament reconstruction with ipsilateral bone-patellar tendon-bone autograft  Orders:  Physical Therapy evaluate and treat  Date of eval:  3/11/16, shoulder 4/12/16  DOS 3/9/16  Subjective:  Pt is w/o c/o knee pn,.  Work:                                  Pts goals:  R/t soccer and work without shoulder pain.      OBJECTIVE:    ROM:     11/4  lachman's / ant drawer L neg  clarks + L  ITB tightness, lat patellar tilt  L > R quad weakness    12/ 13 Flex L 145 AROM , R 142   Ext L - 1      7/19  MMT:  Shoulder er  R 5, L     Hip abd L 5   Hip ext 5 ( tight hip flexors)    12/13 Biodex test 180deg/ sec QS L  72 % D        HS 16% D    300 deg/ sec QS 55 % D        HS + 40 % S    Peak Tq/ BW  180 deg/ sec Quads R 63,  L 17  Normal Men 180 deg/ sec: 65- 75  Normal Women 180 deg/ sec : 50- 60    Qs/ HS ratio R 50,  L  152  Normal 66- 76      TREATMENT:  Pt performed LE rom and strengthening as tolerated, including:   stat bike 10 min NP  ellip 10 min     biodex retest np  jogging onTM 4 min with 2 min warm-up,  2 min cool down   HSS 2 min B    gss incline 2 min   Quad stretch NP  itb foam roll 15x np  Lat step mtb 60 ft     wall squat 3x fatigue    He sls  30x  10#   TKE 27# 30x  U squat 30x  with sports cord NP  Hip flex 3.5 plates 30x NP  B leg press 140# 30x   calf press 140# 30x   U leg press 100# 30x   U lunge bench 30x  With ed to avoid trunk flexion NP  Lunge walk 2 lap  with ball NP  laq 30x [id number]  Hs hammer 35 # 30x    pilates chair hip step-up 30x 2 spg - ed to avoid using UEs   Plyo shuttle 1 band 2 up 1 down 30x NP  Lat step ups 6 in 3 x 10   prone ext hang  5 min # 5   Bridge march 10 sec x 10 U  NP  Star lunge walk 1 lap OTB   Foam roll B ITB, HS, quad 15 each NP  PROM, patella/jt  mobs x  10  min NP       Russian quad stim 10/20,  10 min with pt ed to activate quad with stim. NP      ASSESSMENT: Pt beryl tx with progression of ther ex well. no c/o pn. Beryl jogging on TM well with no adverse effects during or post.  Decreased strength L quad ,.seemed to have improved ability to activate quads and no pn with return to jogging and plyo exs today. Tight L ITB.  Medical necessity is demonstrated by the following  IMPAIRMENTS:  Pain limits function of effected part for some activities, Unable to participate fully in daily activities, Requires skilled supervision to complete and progress HEP, Weakness and Edema    GOALS:   40_   weeks. Pt agrees with goals set  1. Independent with HEP.  2. Report decreased    L knee    pain  <   / =  2  /10 with adls such as jogging, walking, soccer (met for walking)  3. Increased MMT  for  L LE to 5/5    4. Increased arom  for  L knee wnl (met for flex)    5. Independent with HEP shoulder (MET)  6. Report decreased    L shoulder   pain  <   / =  3  /10 with adls such as sleeping, abduction arom/ working (MET)  7. Increased MMT  for  L shoulder   To 5/5 (met except mid traps)      PLAN:  Outpatient physical therapy 1- 2 times weekly to include: pt ed, hep, therapeutic exercises, neuromuscular re-education/ balance exercises, joint mobilizations, modalities prn, and aquatic therapy.    Pt may see PTA as part of treatment plan.  Cont PT for 9  weeks.

## 2017-03-09 ENCOUNTER — CLINICAL SUPPORT (OUTPATIENT)
Dept: REHABILITATION | Facility: HOSPITAL | Age: 47
End: 2017-03-09
Attending: FAMILY MEDICINE
Payer: COMMERCIAL

## 2017-03-09 DIAGNOSIS — G89.29 CHRONIC PAIN OF LEFT KNEE: Primary | ICD-10-CM

## 2017-03-09 DIAGNOSIS — M25.562 CHRONIC PAIN OF LEFT KNEE: Primary | ICD-10-CM

## 2017-03-09 PROCEDURE — 97110 THERAPEUTIC EXERCISES: CPT

## 2017-03-09 NOTE — PROGRESS NOTES
PT knee treatment   Physician:Dr. Hamilton/ Dr. Bills  Diagnosis: L shoulder pain   Encounter Diagnosis   Name Primary?    Left knee pain Yes      DOS/ PROCEDURE: 3/9/16  PROCEDURES:   1. Left knee anterior cruciate ligament reconstruction with ipsilateral bone-patellar tendon-bone autograft  Orders:  Physical Therapy evaluate and treat  Date of eval:  3/11/16, shoulder 4/12/16  DOS 3/9/16  Subjective:  Pt is w/o c/o knee pn,.states that he has begun working out with a  for upper body.  Work:                                  Pts goals:  R/t soccer and work without shoulder pain.      OBJECTIVE:    ROM:     11/4  lachman's / ant drawer L neg  clarks + L  ITB tightness, lat patellar tilt  L > R quad weakness    12/ 13 Flex L 145 AROM , R 142   Ext L - 1      7/19  MMT:  Shoulder er  R 5, L     Hip abd L 5   Hip ext 5 ( tight hip flexors)    12/13 Biodex test 180deg/ sec QS L  72 % D        HS 16% D    300 deg/ sec QS 55 % D        HS + 40 % S    Peak Tq/ BW  180 deg/ sec Quads R 63,  L 17  Normal Men 180 deg/ sec: 65- 75  Normal Women 180 deg/ sec : 50- 60    Qs/ HS ratio R 50,  L  152  Normal 66- 76      TREATMENT:  Pt performed LE rom and strengthening as tolerated, including:   stat bike 10 min NP  ellip 10 min     biodex retest np  jogging onTM 4 min with 2 min warm-up,  2 min cool down   HSS 2 min B    gss incline 2 min   Quad stretch NP  itb foam roll 15x np  Lat step mtb 60 ft   NP  wall squat 3x fatigue  NP  He sls  30x  10#  NP  TKE 27# 30x NP  U squat 30x  with sports cord NP  Hip flex 3.5 plates 30x NP  B leg press 140# 30x NP  calf press 140# 30x NP  U leg press 100# 30x NP  U lunge bench 30x  With ed to avoid trunk flexion NP  Lunge walk 2 lap  with ball   laq 30x [id number]#  Hs hammer 35 # 30x    pilates chair hip step-up 30x 2 spg - ed to avoid using UEs   Plyo shuttle 1 band 2 up 1 down 30x NP  Lat step ups 6 in 3 x 10NP   prone ext hang  5 min # 5 NP  Bridge march 10 sec x 10 U   NP  Star lunge walk 1 lap OTB NP  Foam roll B ITB, HS, quad 15 each NP  Ladder drills 2 laps ea, fwd, side, shuffle.  PROM, patella/jt mobs x  10  min NP       Russian quad stim 10/20,  10 min with pt ed to activate quad with stim. NP    Pt received CP to L knee x 10 min      ASSESSMENT: Pt beryl tx with progression of ther ex well. with addition of agility activities today. Beryl jogging on TM well with no adverse effects during or post.  Decreased strength L quad ,.seemed to have improved ability to activate quads and no pn with return to jogging and plyo exs today. Tight L ITB.  Medical necessity is demonstrated by the following  IMPAIRMENTS:  Pain limits function of effected part for some activities, Unable to participate fully in daily activities, Requires skilled supervision to complete and progress HEP, Weakness and Edema    GOALS:   40_   weeks. Pt agrees with goals set  1. Independent with HEP.  2. Report decreased    L knee    pain  <   / =  2  /10 with adls such as jogging, walking, soccer (met for walking)  3. Increased MMT  for  L LE to 5/5    4. Increased arom  for  L knee wnl (met for flex)    5. Independent with HEP shoulder (MET)  6. Report decreased    L shoulder   pain  <   / =  3  /10 with adls such as sleeping, abduction arom/ working (MET)  7. Increased MMT  for  L shoulder   To 5/5 (met except mid traps)      PLAN:  Outpatient physical therapy 1- 2 times weekly to include: pt ed, hep, therapeutic exercises, neuromuscular re-education/ balance exercises, joint mobilizations, modalities prn, and aquatic therapy.    Pt may see PTA as part of treatment plan.  Cont PT for 9  weeks.

## 2017-03-14 ENCOUNTER — CLINICAL SUPPORT (OUTPATIENT)
Dept: REHABILITATION | Facility: HOSPITAL | Age: 47
End: 2017-03-14
Attending: FAMILY MEDICINE
Payer: COMMERCIAL

## 2017-03-14 DIAGNOSIS — G89.29 CHRONIC PAIN OF LEFT KNEE: Primary | ICD-10-CM

## 2017-03-14 DIAGNOSIS — M25.562 CHRONIC PAIN OF LEFT KNEE: Primary | ICD-10-CM

## 2017-03-14 PROCEDURE — 97110 THERAPEUTIC EXERCISES: CPT

## 2017-03-14 NOTE — PROGRESS NOTES
PT knee treatment   Physician:Dr. Hamilton/ Dr. Bills  Diagnosis: L shoulder pain   Encounter Diagnosis   Name Primary?    Left knee pain Yes      DOS/ PROCEDURE: 3/9/16  PROCEDURES:   1. Left knee anterior cruciate ligament reconstruction with ipsilateral bone-patellar tendon-bone autograft  Orders:  Physical Therapy evaluate and treat  Date of eval:  3/11/16, shoulder 4/12/16  DOS 3/9/16  Subjective:  Pt is w/o c/o knee pn,.reports no adverse effects from last tx..  Work:                                  Pts goals:  R/t soccer and work without shoulder pain.      OBJECTIVE:    ROM:     11/4  lachman's / ant drawer L neg  clarks + L  ITB tightness, lat patellar tilt  L > R quad weakness    12/ 13 Flex L 145 AROM , R 142   Ext L - 1      7/19  MMT:  Shoulder er  R 5, L     Hip abd L 5   Hip ext 5 ( tight hip flexors)    12/13 Biodex test 180deg/ sec QS L  72 % D        HS 16% D    300 deg/ sec QS 55 % D        HS + 40 % S    Peak Tq/ BW  180 deg/ sec Quads R 63,  L 17  Normal Men 180 deg/ sec: 65- 75  Normal Women 180 deg/ sec : 50- 60    Qs/ HS ratio R 50,  L  152  Normal 66- 76      TREATMENT:  Pt performed LE rom and strengthening as tolerated, including:   stat bike 10 min   ellip 10 min   NP  biodex retest np  jogging onTM 5 min with 2 min warm-up,  2 min cool down   HSS 2 min B    gss incline 2 min   Quad stretch NP  itb foam roll 15x np  Lat step mtb 60 ft   NP  wall squat 3x fatigue  NP  He sls  30x  10#    TKE 27# 30x   U squat 30x  with sports cord NP  Hip flex 3.5 plates 30x NP  B leg press 140# 30x NP  calf press 140# 30x NP  U leg press 100# 30x NP  U lunge bench 30x  With ed to avoid trunk flexion NP  Lunge walk 2 lap  with ball   laq 30x hammer 20#  Hs hammer 35 # 30x    pilates chair hip step-up 30x 2 spg - ed to avoid using UEs   Plyo shuttle 1 band 2 up 1 down 30x NP  Lat step ups 6 in 3 x 10   prone ext hang  5 min # 5 NP  Bridge march 10 sec x 10 U  NP  Star lunge walk 1 lap OTB  NP  Foam roll B ITB, HS, quad 15 each NP  Ladder drills 2 laps ea, fwd, side, shuffle. scissors .  PROM, patella/jt mobs x  10  min NP       Russian quad stim 10/20,  10 min with pt ed to activate quad with stim. NP    Pt received CP to L knee x 10 min      ASSESSMENT: Pt beryl tx with progression of ther ex well. with addition of agility activities today. Beryl increased jogging on TM well with no adverse effects during or post.  Decreased strength L quad ,.seemed to have improved ability to activate quads and no pn with return to jogging and plyo exs today. Tight L ITB.  Medical necessity is demonstrated by the following  IMPAIRMENTS:  Pain limits function of effected part for some activities, Unable to participate fully in daily activities, Requires skilled supervision to complete and progress HEP, Weakness and Edema    GOALS:   40_   weeks. Pt agrees with goals set  1. Independent with HEP.  2. Report decreased    L knee    pain  <   / =  2  /10 with adls such as jogging, walking, soccer (met for walking)  3. Increased MMT  for  L LE to 5/5    4. Increased arom  for  L knee wnl (met for flex)    5. Independent with HEP shoulder (MET)  6. Report decreased    L shoulder   pain  <   / =  3  /10 with adls such as sleeping, abduction arom/ working (MET)  7. Increased MMT  for  L shoulder   To 5/5 (met except mid traps)      PLAN:  Outpatient physical therapy 1- 2 times weekly to include: pt ed, hep, therapeutic exercises, neuromuscular re-education/ balance exercises, joint mobilizations, modalities prn, and aquatic therapy.    Pt may see PTA as part of treatment plan.  Cont PT for 9  weeks.

## 2017-03-16 ENCOUNTER — CLINICAL SUPPORT (OUTPATIENT)
Dept: REHABILITATION | Facility: HOSPITAL | Age: 47
End: 2017-03-16
Attending: FAMILY MEDICINE
Payer: COMMERCIAL

## 2017-03-16 DIAGNOSIS — G89.29 CHRONIC PAIN OF LEFT KNEE: Primary | ICD-10-CM

## 2017-03-16 DIAGNOSIS — M25.562 CHRONIC PAIN OF LEFT KNEE: Primary | ICD-10-CM

## 2017-03-16 PROCEDURE — 97750 PHYSICAL PERFORMANCE TEST: CPT | Performed by: INTERNAL MEDICINE

## 2017-03-16 PROCEDURE — 97110 THERAPEUTIC EXERCISES: CPT | Performed by: INTERNAL MEDICINE

## 2017-03-17 NOTE — PROGRESS NOTES
PT knee treatment   Physician:Dr. Hamilton/ Dr. Bills  Diagnosis: L shoulder pain   Encounter Diagnosis   Name Primary?    Left knee pain Yes      DOS/ PROCEDURE: 3/9/16  PROCEDURES:   1. Left knee anterior cruciate ligament reconstruction with ipsilateral bone-patellar tendon-bone autograft  Orders:  Physical Therapy evaluate and treat  Date of eval:  3/11/16, shoulder 4/12/16  DOS 3/9/16  Subjective:  Pt is w/o c/o knee pn, reports no adverse effects from last tx. Feels like knee strength is improving. Pt states he has been working with a  and his hs are sore.  Work:                                  Pts goals:  R/t soccer and work without shoulder pain.       OBJECTIVE:    ROM:     11/4  lachman's / ant drawer L neg  clarks + L  ITB tightness, lat patellar tilt  L > R quad weakness    12/ 13 Flex L 145 AROM , R 142  3/16   Ext L 0     7/19  MMT:  Shoulder er  R 5, L     Hip abd L 5   Hip ext 5 ( tight hip flexors)    3/16 Biodex test 180deg/ sec QS L  52 % D        HS 9% D    300 deg/ sec QS  26 % D        HS + 48 % S    Peak Tq/ BW  180 deg/ sec Quads R 63,  L 17  Normal Men 180 deg/ sec: 65- 75  Normal Women 180 deg/ sec : 50- 60    Qs/ HS ratio R 53,  L  100  Normal 66- 76      TREATMENT:  Pt performed LE rom and strengthening as tolerated, including:   stat bike 5 min   ellip 10 min   NP  biodex retest   jogging onTM 5 min with 2 min warm-up,  2 min cool down   HSS 2 min B    gss incline 2 min   Quad stretch NP  itb foam roll 15x np  Lat step mtb 60 ft   NP  wall squat 3x fatigue  NP  He sls  30x  10#   np  TKE 27# 30x  np  U squat 30x  with sports cord NP  U lunge bench 30x  With hold on last rep  Lunge walk 2 lap  with ball  np  laq 30x hammer 20#  Hs hammer 35 # 30x  np  pilates chair hip step-up 30x 2 spg - ed to avoid using UEs np  Plyo shuttle 1 band 2 up 1 down 30x NP  Lat step ups 6 in 3 x 10   prone ext hang  5 min # 5   Bridge march 10 sec x 10 U  NP  NP    Ladder drills  2 laps ea, fwd, side, shuffle. scissors .  Manual resistance sLr 4 x 10, laq 2 x 10    Pt ed to cont to avoid jogging other than on straight line, even surfaces  And to cont focusing on quad strengthening. Ed to strengthen only every other day. Pt verbalized  understanding.       ASSESSMENT: Pt beryl tx no c/o pain,  Cont decreased L quad strength but improved biodex test.  Medical necessity is demonstrated by the following  IMPAIRMENTS:  Pain limits function of effected part for some activities, Unable to participate fully in daily activities, Requires skilled supervision to complete and progress HEP, Weakness and Edema    GOALS:   40_   weeks. Pt agrees with goals set  1. Independent with HEP.  2. Report decreased    L knee    pain  <   / =  2  /10 with adls such as jogging, walking, soccer (met for walking)  3. Increased MMT  for  L LE to 5/5    4. Increased arom  for  L knee wnl (met for flex)    5. Independent with HEP shoulder (MET)  6. Report decreased    L shoulder   pain  <   / =  3  /10 with adls such as sleeping, abduction arom/ working (MET)  7. Increased MMT  for  L shoulder   To 5/5 (met except mid traps)      PLAN:  Outpatient physical therapy 1- 2 times weekly to include: pt ed, hep, therapeutic exercises, neuromuscular re-education/ balance exercises, joint mobilizations, modalities prn, and aquatic therapy.    Pt may see PTA as part of treatment plan.  Cont PT for 8  weeks.     PT/PTA met face to face to discuss patient's treatment plan and progress towards established goals.  Treatment will be continued as described in initial report/eval and progress notes.  Patient will be seen by physical therapist every sixth visit and minimally once per month.        Robert MENDOZA

## 2017-03-21 ENCOUNTER — CLINICAL SUPPORT (OUTPATIENT)
Dept: REHABILITATION | Facility: HOSPITAL | Age: 47
End: 2017-03-21
Attending: FAMILY MEDICINE
Payer: COMMERCIAL

## 2017-03-21 DIAGNOSIS — M25.562 CHRONIC PAIN OF LEFT KNEE: Primary | ICD-10-CM

## 2017-03-21 DIAGNOSIS — G89.29 CHRONIC PAIN OF LEFT KNEE: Primary | ICD-10-CM

## 2017-03-21 PROCEDURE — 97110 THERAPEUTIC EXERCISES: CPT

## 2017-03-21 NOTE — PROGRESS NOTES
PT knee treatment   Physician:Dr. Hamilton/ Dr. Bills  Diagnosis: L shoulder pain   Encounter Diagnosis   Name Primary?    Left knee pain Yes      DOS/ PROCEDURE: 3/9/16  PROCEDURES:   1. Left knee anterior cruciate ligament reconstruction with ipsilateral bone-patellar tendon-bone autograft  Orders:  Physical Therapy evaluate and treat  Date of eval:  3/11/16, shoulder 4/12/16  DOS 3/9/16  Subjective:  Pt is w/o c/o knee pn, reports no adverse effects from last tx. Feels like knee strength is improving. Pt states he has been working with a  and his hs are sore.  Work:                                  Pts goals:  R/t soccer and work without shoulder pain.       OBJECTIVE:    ROM:     11/4  lachman's / ant drawer L neg  clarks + L  ITB tightness, lat patellar tilt  L > R quad weakness    12/ 13 Flex L 145 AROM , R 142  3/16   Ext L 0     7/19  MMT:  Shoulder er  R 5, L     Hip abd L 5   Hip ext 5 ( tight hip flexors)    3/16 Biodex test 180deg/ sec QS L  52 % D        HS 9% D    300 deg/ sec QS  26 % D        HS + 48 % S    Peak Tq/ BW  180 deg/ sec Quads R 63,  L 17  Normal Men 180 deg/ sec: 65- 75  Normal Women 180 deg/ sec : 50- 60    Qs/ HS ratio R 53,  L  100  Normal 66- 76      TREATMENT:  Pt performed LE rom and strengthening as tolerated, including:   stat bike 5 min NP  ellip 10 min     biodex retest NP  jogging onTM 5 min with 2 min warm-up,  2 min cool down   HSS 2 min B    gss incline 2 min   Quad stretch NP  itb foam roll 15x np  Lat step mtb 60 ft   NP   SL wall squat 3x fatigue    He sls  30x  10#   np  TKE 27# 30x    U squat 30x  with sports cord NP  U lunge bench 30x  With hold on last rep  Lunge walk 2 lap  with ball    laq 30x hammer 20#  Hs hammer 35 # 30x    pilates chair hip step-up 30x 2 spg - ed to avoid using UEs np  Plyo shuttle 1 band 2 up 1 down 30x NP  Lat step ups 6 in 3 x 10   prone ext hang  5 min # 5  NP  Bridge march 10 sec x 10 U  NP  NP    Ladder drills  2 laps ea, fwd, side, shuffle. scissors .NP  Manual resistance sLr 4 x 10, laq 2 x 10    Pt ed to cont to avoid jogging other than on straight line, even surfaces  And to cont focusing on quad strengthening. Ed to strengthen only every other day. Pt verbalized  understanding.       ASSESSMENT: Pt beryl tx no c/o pain,  Cont decreased L quad strength but improved biodex test.  Medical necessity is demonstrated by the following  IMPAIRMENTS:  Pain limits function of effected part for some activities, Unable to participate fully in daily activities, Requires skilled supervision to complete and progress HEP, Weakness and Edema    GOALS:   40_   weeks. Pt agrees with goals set  1. Independent with HEP.  2. Report decreased    L knee    pain  <   / =  2  /10 with adls such as jogging, walking, soccer (met for walking)  3. Increased MMT  for  L LE to 5/5    4. Increased arom  for  L knee wnl (met for flex)    5. Independent with HEP shoulder (MET)  6. Report decreased    L shoulder   pain  <   / =  3  /10 with adls such as sleeping, abduction arom/ working (MET)  7. Increased MMT  for  L shoulder   To 5/5 (met except mid traps)      PLAN:  Outpatient physical therapy 1- 2 times weekly to include: pt ed, hep, therapeutic exercises, neuromuscular re-education/ balance exercises, joint mobilizations, modalities prn, and aquatic therapy.    Pt may see PTA as part of treatment plan.  Cont PT for 8  weeks.     PT/PTA met face to face to discuss patient's treatment plan and progress towards established goals.  Treatment will be continued as described in initial report/eval and progress notes.  Patient will be seen by physical therapist every sixth visit and minimally once per month.        Robert MENDOZA

## 2017-03-23 ENCOUNTER — CLINICAL SUPPORT (OUTPATIENT)
Dept: REHABILITATION | Facility: HOSPITAL | Age: 47
End: 2017-03-23
Attending: FAMILY MEDICINE
Payer: COMMERCIAL

## 2017-03-23 DIAGNOSIS — G89.29 CHRONIC PAIN OF LEFT KNEE: Primary | ICD-10-CM

## 2017-03-23 DIAGNOSIS — M25.562 CHRONIC PAIN OF LEFT KNEE: Primary | ICD-10-CM

## 2017-03-23 PROCEDURE — 97110 THERAPEUTIC EXERCISES: CPT

## 2017-03-23 NOTE — PROGRESS NOTES
PT knee treatment   Physician:Dr. Hamilton/ Dr. Bills  Diagnosis: L shoulder pain   Encounter Diagnosis   Name Primary?    Left knee pain Yes      DOS/ PROCEDURE: 3/9/16  PROCEDURES:   1. Left knee anterior cruciate ligament reconstruction with ipsilateral bone-patellar tendon-bone autograft  Orders:  Physical Therapy evaluate and treat  Date of eval:  3/11/16, shoulder 4/12/16  DOS 3/9/16  Subjective:  Pt is w/o c/o knee pn, reports no adverse effects from last tx. Feels like knee strength is improving. Pt states he has been working with a  and his hs are sore.  Work:                                  Pts goals:  R/t soccer and work without shoulder pain.       OBJECTIVE:    ROM:     11/4  lachman's / ant drawer L neg  clarks + L  ITB tightness, lat patellar tilt  L > R quad weakness    12/ 13 Flex L 145 AROM , R 142  3/16   Ext L 0     7/19  MMT:  Shoulder er  R 5, L     Hip abd L 5   Hip ext 5 ( tight hip flexors)    3/16 Biodex test 180deg/ sec QS L  52 % D        HS 9% D    300 deg/ sec QS  26 % D        HS + 48 % S    Peak Tq/ BW  180 deg/ sec Quads R 63,  L 17  Normal Men 180 deg/ sec: 65- 75  Normal Women 180 deg/ sec : 50- 60    Qs/ HS ratio R 53,  L  100  Normal 66- 76      TREATMENT:  Pt performed LE rom and strengthening as tolerated, including:   stat bike 5 min NP  ellip 10 min     biodex retest NP  jogging onTM 5 min with 2 min warm-up,  2 min cool down NP  HSS 2 min B    gss incline 2 min   Quad stretch NP  itb foam roll 15x np  U stance ball toss 30 x each way  Lat step mtb 60 ft      SL wall squat 3x fatigue    He sls  30x  10#    TKE 27# 30x  NP  U squat 30x  with sports cord NP  U lunge bench 30x  With hold on last rep NP  Lunge walk 2 lap  with ball    laq 30x hammer 20#  Hs hammer 30 # 30x    pilates chair hip step-up 30x 2 spg - ed to avoid using UEs   Plyo shuttle 1 band 2 up 1 down 30x NP  Step down 6 in 3 x 10   prone ext hang  5 min # 5  NP  Bridge march 10 sec  x 10 U  NP  NP    Ladder drills 2 laps ea, fwd, side, shuffle. scissors .NP  Manual resistance sLr 4 x 10, laq 2 x 10    Pt ed to cont to avoid jogging other than on straight line, even surfaces  And to cont focusing on quad strengthening. Ed to strengthen only every other day. Pt verbalized  understanding.       ASSESSMENT: Pt beryl tx with bal and core stab ex with no c/o pain,  Cont decreased L quad strength but improved biodex test.  Medical necessity is demonstrated by the following  IMPAIRMENTS:  Pain limits function of effected part for some activities, Unable to participate fully in daily activities, Requires skilled supervision to complete and progress HEP, Weakness and Edema    GOALS:   40_   weeks. Pt agrees with goals set  1. Independent with HEP.  2. Report decreased    L knee    pain  <   / =  2  /10 with adls such as jogging, walking, soccer (met for walking)  3. Increased MMT  for  L LE to 5/5    4. Increased arom  for  L knee wnl (met for flex)    5. Independent with HEP shoulder (MET)  6. Report decreased    L shoulder   pain  <   / =  3  /10 with adls such as sleeping, abduction arom/ working (MET)  7. Increased MMT  for  L shoulder   To 5/5 (met except mid traps)      PLAN:  Outpatient physical therapy 1- 2 times weekly to include: pt ed, hep, therapeutic exercises, neuromuscular re-education/ balance exercises, joint mobilizations, modalities prn, and aquatic therapy.    Pt may see PTA as part of treatment plan.  Cont PT for 8  weeks.     PT/PTA met face to face to discuss patient's treatment plan and progress towards established goals.  Treatment will be continued as described in initial report/eval and progress notes.  Patient will be seen by physical therapist every sixth visit and minimally once per month.        Robert MENDOZA

## 2017-03-28 ENCOUNTER — CLINICAL SUPPORT (OUTPATIENT)
Dept: REHABILITATION | Facility: HOSPITAL | Age: 47
End: 2017-03-28
Attending: FAMILY MEDICINE
Payer: COMMERCIAL

## 2017-03-28 DIAGNOSIS — G89.29 CHRONIC PAIN OF LEFT KNEE: Primary | ICD-10-CM

## 2017-03-28 DIAGNOSIS — M25.562 CHRONIC PAIN OF LEFT KNEE: Primary | ICD-10-CM

## 2017-03-28 PROCEDURE — 97110 THERAPEUTIC EXERCISES: CPT

## 2017-03-28 NOTE — PROGRESS NOTES
PT knee treatment   Physician:Dr. Hamilton/ Dr. Bills  Diagnosis: L shoulder pain   Encounter Diagnosis   Name Primary?    Left knee pain Yes      DOS/ PROCEDURE: 3/9/16  PROCEDURES:   1. Left knee anterior cruciate ligament reconstruction with ipsilateral bone-patellar tendon-bone autograft  Orders:  Physical Therapy evaluate and treat  Date of eval:  3/11/16, shoulder 4/12/16  DOS 3/9/16  Subjective:  Pt is w/o c/o knee pn, reports no adverse effects from last tx. Feels like knee strength is improving. Pt states he has been working with a  and his hs are sore.  Work:                                  Pts goals:  R/t soccer and work without shoulder pain.       OBJECTIVE:    ROM:     11/4  lachman's / ant drawer L neg  clarks + L  ITB tightness, lat patellar tilt  L > R quad weakness    12/ 13 Flex L 145 AROM , R 142  3/16   Ext L 0     7/19  MMT:  Shoulder er  R 5, L     Hip abd L 5   Hip ext 5 ( tight hip flexors)    3/16 Biodex test 180deg/ sec QS L  52 % D        HS 9% D    300 deg/ sec QS  26 % D        HS + 48 % S    Peak Tq/ BW  180 deg/ sec Quads R 63,  L 17  Normal Men 180 deg/ sec: 65- 75  Normal Women 180 deg/ sec : 50- 60    Qs/ HS ratio R 53,  L  100  Normal 66- 76      TREATMENT:  Pt performed LE rom and strengthening as tolerated, including:   stat bike 5 min NP  ellip 10 min     biodex retest NP  jogging onTM 5 min with 2 min warm-up,  2 min cool down   HSS 2 min B    gss incline 2 min   Quad stretch NP  itb foam roll 15x np  U stance ball toss 30 x each way NP  Sports cord fwd/bwd 30x ea  Lat step mtb 60 ft NP  Monster walk BTB 1 lap     SL wall squat 3x fatigue    He sls  30x  10#  NP  TKE 27# 30x    U squat 30x  with sports cord NP  U lunge bench 30x  With hold on last rep NP  Lunge walk 2 lap  with ball    laq 30x hammer 22.5#  Hs hammer 30 # 30x    pilates chair hip step-up 30x 2 spg - ed to avoid using UEs   Plyo shuttle 1 band 2 up 1 down 30x NP  Step down 6 in 3 x  10   prone ext hang  5 min # 5  NP  Bridge march 10 sec x 10 U  NP  NP    Ladder drills 2 laps ea, fwd, side, shuffle. scissors .NP  Manual resistance sLr 4 x 10, laq 2 x 10    Pt ed to cont to avoid jogging other than on straight line, even surfaces  And to cont focusing on quad strengthening. Ed to strengthen only every other day. Pt verbalized  understanding.       ASSESSMENT: Pt beryl tx with bal and core stab ex with no c/o pain,  Cont decreased L quad strength but improved biodex test.  Medical necessity is demonstrated by the following  IMPAIRMENTS:  Pain limits function of effected part for some activities, Unable to participate fully in daily activities, Requires skilled supervision to complete and progress HEP, Weakness and Edema    GOALS:   40_   weeks. Pt agrees with goals set  1. Independent with HEP.  2. Report decreased    L knee    pain  <   / =  2  /10 with adls such as jogging, walking, soccer (met for walking)  3. Increased MMT  for  L LE to 5/5    4. Increased arom  for  L knee wnl (met for flex)    5. Independent with HEP shoulder (MET)  6. Report decreased    L shoulder   pain  <   / =  3  /10 with adls such as sleeping, abduction arom/ working (MET)  7. Increased MMT  for  L shoulder   To 5/5 (met except mid traps)      PLAN:  Outpatient physical therapy 1- 2 times weekly to include: pt ed, hep, therapeutic exercises, neuromuscular re-education/ balance exercises, joint mobilizations, modalities prn, and aquatic therapy.    Pt may see PTA as part of treatment plan.  Cont PT for 8  weeks.     PT/PTA met face to face to discuss patient's treatment plan and progress towards established goals.  Treatment will be continued as described in initial report/eval and progress notes.  Patient will be seen by physical therapist every sixth visit and minimally once per month.        Robert MENDOZA

## 2017-04-04 ENCOUNTER — CLINICAL SUPPORT (OUTPATIENT)
Dept: REHABILITATION | Facility: HOSPITAL | Age: 47
End: 2017-04-04
Attending: FAMILY MEDICINE
Payer: COMMERCIAL

## 2017-04-04 DIAGNOSIS — M25.562 CHRONIC PAIN OF LEFT KNEE: Primary | ICD-10-CM

## 2017-04-04 DIAGNOSIS — G89.29 CHRONIC PAIN OF LEFT KNEE: Primary | ICD-10-CM

## 2017-04-04 PROCEDURE — 97110 THERAPEUTIC EXERCISES: CPT | Performed by: INTERNAL MEDICINE

## 2017-04-04 PROCEDURE — 97112 NEUROMUSCULAR REEDUCATION: CPT | Performed by: INTERNAL MEDICINE

## 2017-04-04 NOTE — PROGRESS NOTES
PT knee treatment   Physician:Dr. Hamilton/ Dr. Bills  Diagnosis: L shoulder pain   Encounter Diagnosis   Name Primary?    Left knee pain Yes      DOS/ PROCEDURE: 3/9/16  PROCEDURES:   1. Left knee anterior cruciate ligament reconstruction with ipsilateral bone-patellar tendon-bone autograft  Orders:  Physical Therapy evaluate and treat  Date of eval:  3/11/16, shoulder 4/12/16    DOS 3/9/16  Subjective:  Pt is w/o c/o knee pn. Feels like knee strength is improving. Pt states he cont working with a .  Work:                                  Pts goals:  R/t soccer and work without shoulder pain.       OBJECTIVE:    ROM:     11/4  lachman's / ant drawer L neg  clarks + L  ITB tightness, lat patellar tilt  L > R quad weakness     12/ 13 Flex L 145 AROM , R 142  3/16   Ext L 0     Hip abd L 5   Hip ext 5    3/16 Biodex test 180deg/ sec QS L  52 % D        HS 9% D    300 deg/ sec QS  26 % D        HS + 48 % S    Peak Tq/ BW  180 deg/ sec Quads R 63,  L 17  Normal Men 180 deg/ sec: 65- 75  Normal Women 180 deg/ sec : 50- 60    Qs/ HS ratio R 53,  L  100  Normal 66- 76      TREATMENT:  Pt performed LE rom and strengthening as tolerated, including:   stat bike 10 min  ellip 10 min   np  biodex retest NP  jogging onTM 5 min with 2 min warm-up,  2 min cool down np  HSS 2 min B    gss incline 2 min   Quad stretch NP  itb foam roll 15x np  U stance ball toss 30 x each way NP  Sports cord 90 sec fd/ back- pt with B c/o hip pain/ fatigue after 1 min, multiple rests needed, decreased L knee flex    Monster walk BTB 1 lap  np    wall squat 3x fatigue  Heel down  He sls  30x  10#  NP  TKE 27# 30x    U squat 30x  with sports cord NP  U lunge bench 30x  With hold on last rep NP  Lunge walk 2 lap  with ball    laq 30x hammer 22.5#  Hs hammer 30 # 30x    pilates chair hip step-up 30x 2 spg - ed to avoid using UEs np  Plyo shuttle U 50 # 3 x 10  Step down 6 in  3 x f   prone ext hang 10 min # 5    Bridge march  10 sec x 10 U  NP due to c/o hs cramps ( worked hs)      Ladder drills 2 laps ea, fwd, side, shuffle. scissors .NP  Manual resistance sLr 3 x 10    ASSESSMENT:   Cont decreased L quad strength- NO knee pain during session.  Medical necessity is demonstrated by the following  IMPAIRMENTS:  Pain limits function of effected part for some activities, Unable to participate fully in daily activities, Requires skilled supervision to complete and progress HEP, Weakness and Edema    GOALS:   60_   weeks. Pt agrees with goals set  1. Independent with HEP.  2. Report decreased    L knee    pain  <   / =  2  /10 with adls such as jogging, walking, soccer (met for walking)  3. Increased MMT  for  L LE to 5/5    4. Increased arom  for  L knee wnl (met for flex)    5. Independent with HEP shoulder (MET)  6. Report decreased    L shoulder   pain  <   / =  3  /10 with adls such as sleeping, abduction arom/ working (MET)  7. Increased MMT  for  L shoulder   To 5/5 (met)      PLAN:  Outpatient physical therapy in 3 weeks for biodex testing, pt ed, hep, therapeutic exercises, neuromuscular re-education/ balance exercises, joint mobilizations, modalities prn.    Pt may see PTA as part of treatment plan.  Cont PT for 8  weeks.

## 2017-05-02 ENCOUNTER — CLINICAL SUPPORT (OUTPATIENT)
Dept: REHABILITATION | Facility: HOSPITAL | Age: 47
End: 2017-05-02
Attending: FAMILY MEDICINE
Payer: COMMERCIAL

## 2017-05-02 ENCOUNTER — TELEPHONE (OUTPATIENT)
Dept: INTERNAL MEDICINE | Facility: CLINIC | Age: 47
End: 2017-05-02

## 2017-05-02 DIAGNOSIS — G89.29 CHRONIC PAIN OF LEFT KNEE: Primary | ICD-10-CM

## 2017-05-02 DIAGNOSIS — M25.562 CHRONIC PAIN OF LEFT KNEE: Primary | ICD-10-CM

## 2017-05-02 PROCEDURE — 97110 THERAPEUTIC EXERCISES: CPT | Performed by: INTERNAL MEDICINE

## 2017-05-02 PROCEDURE — 97750 PHYSICAL PERFORMANCE TEST: CPT | Performed by: INTERNAL MEDICINE

## 2017-05-03 NOTE — PROGRESS NOTES
PT knee treatment   Physician:Dr. Hamilton/ Dr. Bills  Diagnosis: L shoulder pain   Encounter Diagnosis   Name Primary?    Left knee pain Yes      DOS/ PROCEDURE: 3/9/16  PROCEDURES:   1. Left knee anterior cruciate ligament reconstruction with ipsilateral bone-patellar tendon-bone autograft  Orders:  Physical Therapy evaluate and treat  Date of eval:  3/11/16, shoulder 4/12/16    DOS 3/9/16  Subjective:  Pt is w/o c/o knee pn. Feels like knee strength is improving. Pt states he cont working with a .  Work:                                  Pts goals:  R/t soccer and work without shoulder pain.       OBJECTIVE:    ROM:     11/4  lachman's / ant drawer L neg  clarks + L  ITB tightness, lat patellar tilt  L > R quad weakness     12/ 13 Flex L 145 AROM , R 142  3/16   Ext L 0     Hip abd L 5   Hip ext 5    5/2 Biodex test 180deg/ sec QS L 28 % D        HS 22% S    300 deg/ sec QS  10 % D        HS + 16 % S    Peak Tq/ BW  180 deg/ sec Quads R 55,  L 40  Normal Men 180 deg/ sec: 65- 75  Normal Women 180 deg/ sec : 50- 60    Qs/ HS ratio R 55,  L 92  Normal 66- 76      TREATMENT:  Pt performed LE rom and strengthening as tolerated, including:     ellip 5 min    biodex retest  HSS 2 min B    gss incline 2 min   Quad stretch   ASSESSMENT:  Improved biodex test naveed quad strength. Ed pt to cont with quad strengthening B and re test in 6 weeks. COnt to avoid cutting/ soccer.   Medical necessity is demonstrated by the following  IMPAIRMENTS:  Pain limits function of effected part for some activities, Unable to participate fully in daily activities, Requires skilled supervision to complete and progress HEP, Weakness and Edema    GOALS:   60_   weeks. Pt agrees with goals set  1. Independent with HEP.  2. Report decreased    L knee    pain  <   / =  2  /10 with adls such as jogging, walking, soccer (met for walking)  3. Increased MMT  for  L LE to 5/5    4. Increased arom  for  L knee wnl (met for  flex)    5. Independent with HEP shoulder (MET)  6. Report decreased    L shoulder   pain  <   / =  3  /10 with adls such as sleeping, abduction arom/ working (MET)  7. Increased MMT  for  L shoulder   To 5/5 (met)      PLAN:  Cont PTin 6 weeks  For repeat test

## 2017-05-04 ENCOUNTER — OFFICE VISIT (OUTPATIENT)
Dept: INTERNAL MEDICINE | Facility: CLINIC | Age: 47
End: 2017-05-04
Payer: COMMERCIAL

## 2017-05-04 VITALS — SYSTOLIC BLOOD PRESSURE: 118 MMHG | HEART RATE: 66 BPM | DIASTOLIC BLOOD PRESSURE: 78 MMHG

## 2017-05-04 DIAGNOSIS — M77.40 METATARSALGIA, UNSPECIFIED LATERALITY: ICD-10-CM

## 2017-05-04 DIAGNOSIS — Z00.00 PREVENTATIVE HEALTH CARE: ICD-10-CM

## 2017-05-04 DIAGNOSIS — M25.512 PAIN IN JOINT OF LEFT SHOULDER: Primary | ICD-10-CM

## 2017-05-04 DIAGNOSIS — E78.5 HYPERLIPIDEMIA, UNSPECIFIED HYPERLIPIDEMIA TYPE: ICD-10-CM

## 2017-05-04 PROCEDURE — 99386 PREV VISIT NEW AGE 40-64: CPT | Mod: S$GLB,,, | Performed by: FAMILY MEDICINE

## 2017-05-04 PROCEDURE — 99999 PR PBB SHADOW E&M-EST. PATIENT-LVL II: CPT | Mod: PBBFAC,,, | Performed by: FAMILY MEDICINE

## 2017-05-04 NOTE — PROGRESS NOTES
Subjective:       Patient ID: Erich Chery is a 46 y.o. male.    Chief Complaint: No chief complaint on file.  Erich Chery 46 y.o. male is here for office visit to review care and physical exam, here for physical.  Reports in good health.  Getting Pt for knee and shoulder.  Does attend gym.        HPI  Review of Systems   Constitutional: Negative for activity change, appetite change, fatigue, fever and unexpected weight change.   HENT: Negative for congestion, hearing loss, postnasal drip and rhinorrhea.    Eyes: Negative for pain, discharge and visual disturbance.   Respiratory: Negative for cough, choking and shortness of breath.    Cardiovascular: Negative for chest pain, palpitations and leg swelling.   Gastrointestinal: Negative for abdominal pain, diarrhea and vomiting.   Genitourinary: Negative for dysuria, flank pain, hematuria and urgency.   Musculoskeletal: Negative for arthralgias, back pain, joint swelling and neck pain.   Skin: Negative for color change and rash.   Neurological: Negative for dizziness, tremors, syncope, weakness, numbness and headaches.   Psychiatric/Behavioral: Negative for agitation and confusion. The patient is not hyperactive.        Objective:      Physical Exam   Constitutional: He is oriented to person, place, and time. He appears well-developed and well-nourished.   HENT:   Head: Normocephalic.   Eyes: EOM are normal. Pupils are equal, round, and reactive to light.   Neck: Normal range of motion. Neck supple. No thyromegaly present.   Cardiovascular: Normal rate.  Exam reveals no gallop and no friction rub.    No murmur heard.  Pulmonary/Chest: Effort normal. No respiratory distress. He has no wheezes.   Abdominal: Soft. Bowel sounds are normal. He exhibits no mass. There is no tenderness.   Musculoskeletal: He exhibits no edema or tenderness.   Lymphadenopathy:     He has no cervical adenopathy.   Neurological: He is alert and oriented to person, place, and time.  He has normal reflexes. No cranial nerve deficit.   Skin: Skin is warm. No rash noted.   Psychiatric: He has a normal mood and affect. His behavior is normal.       Assessment:       No diagnosis found.    Plan:       Diagnoses and all orders for this visit:    Preventative health care  -     Comprehensive metabolic panel; Future  -     Lipid panel; Future  -     CBC auto differential; Future  -     Hemoglobin A1c; Future  -     TSH; Future  -     Hepatitis C antibody; Future  -     RPR; Future  -     HIV-1 and HIV-2 antibodies; Future  -     C. trachomatis/N. gonorrhoeae by AMP DNA Urine    Hyperlipidemia, unspecified hyperlipidemia type  -     Comprehensive metabolic panel; Future  -     Lipid panel; Future    Metatarsalgia, unspecified laterality    - Discussed, may want to discuss with Sports medicine

## 2017-05-04 NOTE — MR AVS SNAPSHOT
[facility]  [address]  [facility] [address]  Phone: [phone]  Fax: [phone]                  Erich Chery   2017 2:00 PM   Office Visit    Description:  Male : 1970   Provider:  Frantz Huddleston MD   Department:  [facility]           Diagnoses this Visit        Comments    Pain in joint of left shoulder    -  Primary     Preventative health care         Hyperlipidemia, unspecified hyperlipidemia type         Metatarsalgia, unspecified laterality                To Do List           Future Appointments        Provider Department Dept Phone    2017 11:00 AM Nafisa Locke, PT [facility] [phone]      Goals (5 Years of Data)     None      Follow-Up and Disposition     Return in about 1 year (around 2018), or if symptoms worsen or fail to improve.    Follow-up and Disposition History      [facility]     [facility] -  Assistance  Unless otherwise directed by your provider, please contact [facility], our nurse care line that is available for  assistance.     Registered nurses in the [facility] provide: appointment scheduling, clinical advisement, health education, and other advisory services.  Call: [phone] (toll free)               Medications           Message regarding Medications     Verify the changes and/or additions to your medication regime listed below are the same as discussed with your clinician today.  If any of these changes or additions are incorrect, please notify your healthcare provider.             Verify that the below list of medications is an accurate representation of the medications you are currently taking.  If none reported, the list may be blank. If incorrect, please contact your healthcare provider. Carry this list with you in case of emergency.           Current Medications     PROAIR HFA 90 mcg/actuation inhaler inhale 2  puffs every 6 hours if needed for wheezing           Clinical Reference Information           Your Vitals Were     BP Pulse                118/78 66          Blood Pressure          Most Recent Value    BP  118/78      Allergies as of 5/4/2017     No Known Allergies      Immunizations Administered on Date of Encounter - 5/4/2017     None      Orders Placed During Today's Visit      Normal Orders This Visit    Ambulatory Referral to Sports Medicine     C. trachomatis/N. gonorrhoeae by AMP DNA Urine     Future Labs/Procedures Expected by Expires    CBC auto differential  5/4/2017 5/4/2018    Comprehensive metabolic panel  5/4/2017 5/4/2018    Hemoglobin A1c  5/4/2017 5/4/2018    Hepatitis C antibody  5/4/2017 7/3/2018    HIV-1 and HIV-2 antibodies  5/4/2017 7/3/2018    Lipid panel  5/4/2017 5/4/2018    RPR  5/4/2017 7/3/2018    TSH  5/4/2017 8/2/2017      Language Assistance Services     ATTENTION: Language assistance services are available, free of charge. Please call [phone].      ATENCIÓN: Si habla bj, tiene a burch disposición servicios gratuitos de asistencia lingüística. Llame al [phone].     CHÚ Ý: N?u b?n nói Ti?ng Vi?t, có các d?ch v? h? tr? ngôn ng? mi?n phí dành cho b?n. G?i s? [phone].         [facility] complies with applicable Federal civil rights laws and does not discriminate on the basis of race, color, national origin, age, disability, or sex.

## 2017-05-17 ENCOUNTER — LAB VISIT (OUTPATIENT)
Dept: LAB | Facility: HOSPITAL | Age: 47
End: 2017-05-17
Attending: FAMILY MEDICINE
Payer: COMMERCIAL

## 2017-05-17 ENCOUNTER — TELEPHONE (OUTPATIENT)
Dept: INTERNAL MEDICINE | Facility: CLINIC | Age: 47
End: 2017-05-17

## 2017-05-17 DIAGNOSIS — E78.5 HYPERLIPIDEMIA, UNSPECIFIED HYPERLIPIDEMIA TYPE: ICD-10-CM

## 2017-05-17 DIAGNOSIS — Z00.00 PREVENTATIVE HEALTH CARE: ICD-10-CM

## 2017-05-17 LAB
ALBUMIN SERPL BCP-MCNC: 3.9 G/DL
ALP SERPL-CCNC: 48 U/L
ALT SERPL W/O P-5'-P-CCNC: 25 U/L
ANION GAP SERPL CALC-SCNC: 8 MMOL/L
AST SERPL-CCNC: 23 U/L
BASOPHILS # BLD AUTO: 0.04 K/UL
BASOPHILS NFR BLD: 0.7 %
BILIRUB SERPL-MCNC: 0.5 MG/DL
BUN SERPL-MCNC: 20 MG/DL
C TRACH DNA SPEC QL NAA+PROBE: NOT DETECTED
CALCIUM SERPL-MCNC: 9.5 MG/DL
CHLORIDE SERPL-SCNC: 103 MMOL/L
CHOLEST/HDLC SERPL: 8.2 {RATIO}
CO2 SERPL-SCNC: 27 MMOL/L
CREAT SERPL-MCNC: 1.1 MG/DL
DIFFERENTIAL METHOD: ABNORMAL
EOSINOPHIL # BLD AUTO: 0.4 K/UL
EOSINOPHIL NFR BLD: 6 %
ERYTHROCYTE [DISTWIDTH] IN BLOOD BY AUTOMATED COUNT: 12.8 %
EST. GFR  (AFRICAN AMERICAN): >60 ML/MIN/1.73 M^2
EST. GFR  (NON AFRICAN AMERICAN): >60 ML/MIN/1.73 M^2
ESTIMATED AVG GLUCOSE: 105 MG/DL
GLUCOSE SERPL-MCNC: 93 MG/DL
HBA1C MFR BLD HPLC: 5.3 %
HCT VFR BLD AUTO: 44.3 %
HCV AB SERPL QL IA: NEGATIVE
HDL/CHOLESTEROL RATIO: 12.1 %
HDLC SERPL-MCNC: 239 MG/DL
HDLC SERPL-MCNC: 29 MG/DL
HGB BLD-MCNC: 15.7 G/DL
HIV 1+2 AB+HIV1 P24 AG SERPL QL IA: NEGATIVE
LDLC SERPL CALC-MCNC: 138.4 MG/DL
LYMPHOCYTES # BLD AUTO: 2.3 K/UL
LYMPHOCYTES NFR BLD: 37.4 %
MCH RBC QN AUTO: 31.7 PG
MCHC RBC AUTO-ENTMCNC: 35.4 %
MCV RBC AUTO: 90 FL
MONOCYTES # BLD AUTO: 0.7 K/UL
MONOCYTES NFR BLD: 11.1 %
N GONORRHOEA DNA SPEC QL NAA+PROBE: NOT DETECTED
NEUTROPHILS # BLD AUTO: 2.7 K/UL
NEUTROPHILS NFR BLD: 44.5 %
NONHDLC SERPL-MCNC: 210 MG/DL
PLATELET # BLD AUTO: 296 K/UL
PMV BLD AUTO: 9.9 FL
POTASSIUM SERPL-SCNC: 4.6 MMOL/L
PROT SERPL-MCNC: 6.9 G/DL
RBC # BLD AUTO: 4.95 M/UL
RPR SER QL: NORMAL
SODIUM SERPL-SCNC: 138 MMOL/L
TRIGL SERPL-MCNC: 358 MG/DL
TSH SERPL DL<=0.005 MIU/L-ACNC: 1.56 UIU/ML
WBC # BLD AUTO: 6.12 K/UL

## 2017-05-17 PROCEDURE — 83036 HEMOGLOBIN GLYCOSYLATED A1C: CPT

## 2017-05-17 PROCEDURE — 84443 ASSAY THYROID STIM HORMONE: CPT

## 2017-05-17 PROCEDURE — 86703 HIV-1/HIV-2 1 RESULT ANTBDY: CPT

## 2017-05-17 PROCEDURE — 86803 HEPATITIS C AB TEST: CPT

## 2017-05-17 PROCEDURE — 80053 COMPREHEN METABOLIC PANEL: CPT

## 2017-05-17 PROCEDURE — 87591 N.GONORRHOEAE DNA AMP PROB: CPT

## 2017-05-17 PROCEDURE — 86592 SYPHILIS TEST NON-TREP QUAL: CPT

## 2017-05-17 PROCEDURE — 85025 COMPLETE CBC W/AUTO DIFF WBC: CPT

## 2017-05-17 PROCEDURE — 80061 LIPID PANEL: CPT

## 2017-05-17 PROCEDURE — 36415 COLL VENOUS BLD VENIPUNCTURE: CPT

## 2017-05-17 NOTE — TELEPHONE ENCOUNTER
----- Message from Perico Curtis MA sent at 5/17/2017 10:53 AM CDT -----  Contact: [phone]  Type: Orders Request    What orders/ testing are being requested? Urinalysis    Is there a future appointment scheduled for the patient with PCP? No    When?    Comments: Please advise and call. Thanks!

## 2017-07-21 ENCOUNTER — DOCUMENTATION ONLY (OUTPATIENT)
Dept: REHABILITATION | Facility: HOSPITAL | Age: 47
End: 2017-07-21

## 2017-07-21 NOTE — PT/OT/SLP DISCHARGE
Physician:Dr. Hamilton/ Dr. Bills  Diagnosis: L shoulder pain   Encounter Diagnosis   Name Primary?    Left knee pain Yes      DOS/ PROCEDURE: 3/9/16  PROCEDURES:   1. Left knee anterior cruciate ligament reconstruction with ipsilateral bone-patellar tendon-bone autograft  Orders:  Physical Therapy evaluate and treat  Date of eval:  3/11/16, shoulder 4/12/16     DOS 3/9/16  Treatment included pt ed, hep, nmr, ex, agility drills  Pt cx last PT appt and has not called to rs as of this date.     OBJECTIVE:    ROM:      11/4  lachman's / ant drawer L neg  clarks + L  ITB tightness, lat patellar tilt  L > R quad weakness     12/ 13 Flex L 145 AROM , R 142  3/16   Ext L 0     Hip abd L 5   Hip ext 5     5/2 Biodex test 180deg/ sec QS L 28 % D        HS 22% S     300 deg/ sec QS  10 % D        HS + 16 % S     Peak Tq/ BW  180 deg/ sec Quads R 55,  L 40  Normal Men 180 deg/ sec: 65- 75  Normal Women 180 deg/ sec : 50- 60     Qs/ HS ratio R 55,  L 92  Normal 66- 76           GOALS:   60_   weeks. Pt met goals  Although pt did not pass biodex or sports test.  1. Independent with HEP.  2. Report decreased    L knee    pain  <   / =  2  /10 with adls such as jogging, walking, soccer (met for walking)  3. Increased MMT  for  L LE to 5/5    4. Increased arom  for  L knee wnl (met for flex)     5. Independent with HEP shoulder (MET)  6. Report decreased    L shoulder   pain  <   / =  3  /10 with adls such as sleeping, abduction arom/ working (MET)  7. Increased MMT  for  L shoulder   To 5/5 (met)

## 2017-08-07 PROBLEM — Z00.00 PREVENTATIVE HEALTH CARE: Status: RESOLVED | Noted: 2017-05-04 | Resolved: 2017-08-07

## 2017-09-14 ENCOUNTER — TELEPHONE (OUTPATIENT)
Dept: SPORTS MEDICINE | Facility: CLINIC | Age: 47
End: 2017-09-14

## 2017-09-14 DIAGNOSIS — M25.562 CHRONIC PAIN OF LEFT KNEE: Primary | ICD-10-CM

## 2017-09-14 DIAGNOSIS — G89.29 CHRONIC PAIN OF LEFT KNEE: Primary | ICD-10-CM

## 2017-09-14 NOTE — TELEPHONE ENCOUNTER
----- Message from Sara Stoddard MA sent at 9/14/2017  3:02 PM CDT -----  Contact: self   Pt calling because he has not been to PT in 2 months and needs new orders sent over to get testing with [facility] for his knee. Pt can be reached at [phone].

## 2017-09-26 ENCOUNTER — CLINICAL SUPPORT (OUTPATIENT)
Dept: REHABILITATION | Facility: HOSPITAL | Age: 47
End: 2017-09-26
Attending: ORTHOPAEDIC SURGERY
Payer: COMMERCIAL

## 2017-09-26 DIAGNOSIS — M25.562 LEFT KNEE PAIN, UNSPECIFIED CHRONICITY: ICD-10-CM

## 2017-09-26 DIAGNOSIS — G89.29 CHRONIC PAIN OF LEFT KNEE: Primary | ICD-10-CM

## 2017-09-26 DIAGNOSIS — M25.562 CHRONIC PAIN OF LEFT KNEE: Primary | ICD-10-CM

## 2017-09-26 DIAGNOSIS — Z47.89 AFTERCARE FOR ANTERIOR CRUCIATE LIGAMENT (ACL) REPAIR: ICD-10-CM

## 2017-09-26 PROCEDURE — 97110 THERAPEUTIC EXERCISES: CPT | Performed by: INTERNAL MEDICINE

## 2017-09-26 PROCEDURE — 97164 PT RE-EVAL EST PLAN CARE: CPT | Performed by: INTERNAL MEDICINE

## 2017-09-26 PROCEDURE — 97750 PHYSICAL PERFORMANCE TEST: CPT | Performed by: INTERNAL MEDICINE

## 2017-09-29 PROBLEM — Z47.89 AFTERCARE FOR ANTERIOR CRUCIATE LIGAMENT (ACL) REPAIR: Status: ACTIVE | Noted: 2017-09-29

## 2017-09-29 NOTE — PROGRESS NOTES
Physician:Dr. Hamilton  Diagnosis:   Encounter Diagnoses   Name Primary?    Chronic pain of left knee Yes    Left knee pain, unspecified chronicity     Aftercare for anterior cruciate ligament (ACL) repair       DOS/ PROCEDURE: 3/9/16  PROCEDURES:   1. Left knee anterior cruciate ligament reconstruction with ipsilateral bone-patellar tendon-bone autograft  Orders:  Physical Therapy evaluate and treat  Date of eval:  9/26/2017    Subjective:    Onset of pain /Mechanism of Onset: soccer twisting injury    Chief complaint:  Has been working with  to strengthen quads but stopped isolating L quad. 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 Not playing with team currently until cleared.      Medical history   [employer]    Educational needs:no barriers noted   Spiritual/Cultural: no specific needs identified    Work:                                  Pts goals:  R/t soccer  Pain:  Pain ranges from 0 to 3 on VAS scale of 0- 10.    OBJECTIVE:  Postural examination in standing:  NWB L     Functional assessment:   - walking:   I     lachman's / ant drawer L neg  L > R quad contraction     Flex L 145 AROM , R 142   Ext L 0     Hip abd L 5   Hip ext 5  ke L 5  KF 5  DF 5    9/29 Biodex test 180deg/ sec QS L 32 % D        HS 3% S    300 deg/ sec QS  14 % D        HS + 14 % S    Peak Tq/ BW  180 deg/ sec Quads R 64,  L 44  Normal Men 180 deg/ sec: 65- 75  Normal Women 180 deg/ sec : 50- 60    Qs/ HS ratio R 50,  L  74    TREATMENT:  Bike 5 min  HSS  Quad stretch  Discussed biodex results. Ed pt to r/t isolating  quads L with strengthening 3 x per week with at least 3 ex- U squat, wall sits U, laq, leg press. ALso ed to cont to avoid playing soccer but light drills with kids' teams are okay. Pt verbalized  understanding. Rec'd copy of biodex results and ed  to call if needed to discuss.    Jog turf 4 laps  Skip 2 laps  Shuffle 3 laps  Dribble soccer ball 2 laps jog + 2 laps with weaving 4 cones  B jumps in place, side to side, fd/ back x 10   U squat cord 3 x 10    Performed above ex with no c/o pain, good mechanics, just fatigue.    ASSESSMENT:  PT diagnosis: L Knee pain s/p ACL repair. Weakness quads and inability to r/t soccer   Patient can benefit from outpatient physical therapy and a home program  Prognosis is Excellent.    Medical necessity is demonstrated by the following  IMPAIRMENTS:  Unable to participate in daily activities,  Pain limits function of effected part for some activities,  Requires skilled supervision to complete and progress HEP, Weakness    GOALS:   2- 6 visits  1. Independent with HEP.  2. Pass biodex test with quad deficiency < 20 %  3. Increased MMT  for  L LE to 5/5     4. Soccer drills with good mechanics and no pain    PLAN:  Pt may see PTA as part of treatment plan.  Cont PT  In 6-8 weeks for repeat test.   I certify the need for these services   furnished under this plan of treatment and while under my   care.____________________________________ Physician/Referring Practitioner Date   of Signature

## 2017-09-29 NOTE — PLAN OF CARE
Please sign and return plan of care. Thank you for this referral.    Physician:Dr. Hamilton  Diagnosis:   Encounter Diagnoses   Name Primary?    Chronic pain of left knee Yes    Left knee pain, unspecified chronicity     Aftercare for anterior cruciate ligament (ACL) repair       DOS/ PROCEDURE: 3/9/16  PROCEDURES:   1. Left knee anterior cruciate ligament reconstruction with ipsilateral bone-patellar tendon-bone autograft  Orders:  Physical Therapy evaluate and treat  Date of eval:  9/26/2017    Subjective:    Onset of pain /Mechanism of Onset: soccer twisting injury    Chief complaint:  Has been working with  to strengthen quads but stopped isolating L quad. 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 Not playing with team currently until cleared.      Medical history   [employer]    Educational needs:no barriers noted   Spiritual/Cultural: no specific needs identified    Work:                                  Pts goals:  R/t soccer  Pain:  Pain ranges from 0 to 3 on VAS scale of 0- 10.    OBJECTIVE:  Postural examination in standing:  NWB L     Functional assessment:   - walking:   I     lachman's / ant drawer L neg  L > R quad contraction     Flex L 145 AROM , R 142   Ext L 0     Hip abd L 5   Hip ext 5  ke L 5  KF 5  DF 5    9/29 Biodex test 180deg/ sec QS L 32 % D        HS 3% S    300 deg/ sec QS  14 % D        HS + 14 % S    Peak Tq/ BW  180 deg/ sec Quads R 64,  L 44  Normal Men 180 deg/ sec: 65- 75  Normal Women 180 deg/ sec : 50- 60    Qs/ HS ratio R 50,  L  74    TREATMENT:  Bike 5  min  HSS  Quad stretch  Discussed biodex results. Ed pt to r/t isolating quads L with strengthening 3 x per week with at least 3 ex- U squat, wall sits U, laq, leg press. ALso ed to cont to avoid playing soccer but light drills with kids' teams are okay. Pt verbalized  understanding. Rec'd copy of biodex results and ed  to call if needed to discuss.    Jog turf 4 laps  Skip 2 laps  Shuffle 3 laps  Dribble soccer ball 2 laps jog + 2 laps with weaving 4 cones  B jumps in place, side to side, fd/ back x 10   U squat cord 3 x 10    Performed above ex with no c/o pain, good mechanics, just fatigue.    ASSESSMENT:  PT diagnosis: L Knee pain s/p ACL repair. Weakness quads and inability to r/t soccer   Patient can benefit from outpatient physical therapy and a home program  Prognosis is Excellent.    Medical necessity is demonstrated by the following  IMPAIRMENTS:  Unable to participate in daily activities,  Pain limits function of effected part for some activities,  Requires skilled supervision to complete and progress HEP, Weakness    GOALS:   2- 6 visits  1. Independent with HEP.  2. Pass biodex test with quad deficiency < 20 %  3. Increased MMT  for  L LE to 5/5     4. Soccer drills with good mechanics and no pain    PLAN:  Pt may see PTA as part of treatment plan.  Cont PT  In 6-8 weeks for repeat test.   I certify the need for these services   furnished under this plan of treatment and while under my   care.____________________________________ Physician/Referring Practitioner Date   of Signature

## 2018-01-09 ENCOUNTER — DOCUMENTATION ONLY (OUTPATIENT)
Dept: REHABILITATION | Facility: HOSPITAL | Age: 48
End: 2018-01-09

## 2018-01-09 NOTE — PT/OT/SLP DISCHARGE
Physician:Dr. Hamilton  Diagnosis:   Encounter Diagnoses   Name Primary?    Chronic pain of left knee Yes    Left knee pain, unspecified chronicity      Aftercare for anterior cruciate ligament (ACL) repair        DOS/ PROCEDURE: 3/9/16  PROCEDURES:   1. Left knee anterior cruciate ligament reconstruction with ipsilateral bone-patellar tendon-bone autograft  Orders:  Physical Therapy evaluate and treat  Date of eval:  9/26/2017  Treatment included pt ed, hep, nmr, ex, biodex testing, cp, jm       9/29 Biodex test 180deg/ sec QS L 32 % D        HS 3% S     300 deg/ sec QS  14 % D        HS + 14 % S     Peak Tq/ BW  180 deg/ sec Quads R 64,  L 44  Normal Men 180 deg/ sec: 65- 75  Normal Women 180 deg/ sec : 50- 60     Qs/ HS ratio R 50,  L  74           GOALS:   40 visits. Pt met goal 1. Did not r/t as planned to assess for other goals.   1. Independent with HEP.  2. Pass biodex test with quad deficiency < 20 %  3. Increased MMT  for  L LE to 5/5     4. Soccer drills with good mechanics and no pain

## 2018-03-02 ENCOUNTER — HOSPITAL ENCOUNTER (EMERGENCY)
Facility: OTHER | Age: 48
Discharge: HOME | End: 2018-03-02
Attending: EMERGENCY MEDICINE
Payer: COMMERCIAL

## 2018-03-02 VITALS
RESPIRATION RATE: 18 BRPM | HEART RATE: 72 BPM | WEIGHT: 225 LBS | OXYGEN SATURATION: 99 % | TEMPERATURE: 98 F | BODY MASS INDEX: 29.82 KG/M2 | HEIGHT: 73 IN | DIASTOLIC BLOOD PRESSURE: 78 MMHG | SYSTOLIC BLOOD PRESSURE: 120 MMHG

## 2018-03-02 DIAGNOSIS — S31.23XA: Primary | ICD-10-CM

## 2018-03-02 PROCEDURE — 90715 TDAP VACCINE 7 YRS/> IM: CPT | Performed by: PHYSICIAN ASSISTANT

## 2018-03-02 PROCEDURE — 63600175 PHARM REV CODE 636 W HCPCS: Performed by: PHYSICIAN ASSISTANT

## 2018-03-02 PROCEDURE — 99283 EMERGENCY DEPT VISIT LOW MDM: CPT | Mod: 25

## 2018-03-02 PROCEDURE — 90471 IMMUNIZATION ADMIN: CPT | Performed by: PHYSICIAN ASSISTANT

## 2018-03-02 PROCEDURE — 25000003 PHARM REV CODE 250: Performed by: PHYSICIAN ASSISTANT

## 2018-03-02 RX ORDER — MUPIROCIN 20 MG/G
1 OINTMENT TOPICAL
Status: COMPLETED | OUTPATIENT
Start: 2018-03-02 | End: 2018-03-02

## 2018-03-02 RX ORDER — AMOXICILLIN AND CLAVULANATE POTASSIUM 875; 125 MG/1; MG/1
1 TABLET, FILM COATED ORAL 2 TIMES DAILY
Qty: 20 TABLET | Refills: 0 | Status: SHIPPED | OUTPATIENT
Start: 2018-03-02 | End: 2018-03-12

## 2018-03-02 RX ADMIN — CLOSTRIDIUM TETANI TOXOID ANTIGEN (FORMALDEHYDE INACTIVATED), CORYNEBACTERIUM DIPHTHERIAE TOXOID ANTIGEN (FORMALDEHYDE INACTIVATED), BORDETELLA PERTUSSIS TOXOID ANTIGEN (GLUTARALDEHYDE INACTIVATED), BORDETELLA PERTUSSIS FILAMENTOUS HEMAGGLUTININ ANTIGEN (FORMALDEHYDE INACTIVATED), BORDETELLA PERTUSSIS PERTACTIN ANTIGEN, AND BORDETELLA PERTUSSIS FIMBRIAE 2/3 ANTIGEN 0.5 ML: 5; 2; 2.5; 5; 3; 5 INJECTION, SUSPENSION INTRAMUSCULAR at 07:03

## 2018-03-02 RX ADMIN — MUPIROCIN 22 G: 20 OINTMENT TOPICAL at 07:03

## 2018-03-03 NOTE — ED PROVIDER NOTES
Encounter Date: 3/2/2018       History     Chief Complaint   Patient presents with    Penis Injury     reports bit by dog on penis through pants. reports puncture wound to tip of penis. dog up to date on shots. pt unsure of tetanus status.      Patient is a 47-year-old male with no significant medical history who presents to the emergency department with penis injury.  Patient states he was at a friend's house, when a dog bit him on the tip of his penis.  He states he bled, but now the bleeding is under control.  He states he is not up-to-date on his tetanus vaccination.  He states the dog is vaccinated.  He reports pain when he touches the wound.  He denies diabetes or immunocompromised state.      The history is provided by the patient.     Review of patient's allergies indicates:  No Known Allergies  Past Medical History:   Diagnosis Date    Asthma     Genital herpes in men     Hyperlipidemia      Past Surgical History:   Procedure Laterality Date    KNEE SURGERY       Family History   Problem Relation Age of Onset    Hyperlipidemia Father     Heart disease Father      CABG     Social History   Substance Use Topics    Smoking status: Former Smoker     Packs/day: 0.50    Smokeless tobacco: Never Used      Comment: smokes a cigar 1-2 times a month    Alcohol use 6.0 - 8.4 oz/week     10 - 14 Glasses of wine per week     Review of Systems   Constitutional: Negative for activity change, appetite change, chills, fatigue and fever.   HENT: Negative for congestion, ear discharge, ear pain, postnasal drip, rhinorrhea, sore throat and trouble swallowing.    Respiratory: Negative for cough and shortness of breath.    Cardiovascular: Negative for chest pain.   Gastrointestinal: Negative for abdominal pain, blood in stool, constipation, diarrhea, nausea and vomiting.   Genitourinary: Positive for penile pain. Negative for discharge, dysuria and flank pain.   Musculoskeletal: Negative for back pain, neck pain and  neck stiffness.   Skin: Positive for wound. Negative for rash.   Neurological: Negative for dizziness, syncope, speech difficulty, weakness, light-headedness, numbness and headaches.       Physical Exam     Initial Vitals [03/02/18 1831]   BP Pulse Resp Temp SpO2   122/80 68 18 98.2 °F (36.8 °C) 98 %      MAP       94         Physical Exam    Nursing note and vitals reviewed.  Constitutional: He appears well-developed and well-nourished. He is not diaphoretic.  Non-toxic appearance. No distress.   HENT:   Head: Normocephalic.   Right Ear: Hearing and external ear normal.   Left Ear: Hearing and external ear normal.   Nose: Nose normal.   Mouth/Throat: Uvula is midline, oropharynx is clear and moist and mucous membranes are normal. No trismus in the jaw. No uvula swelling. No oropharyngeal exudate.   Eyes: Conjunctivae are normal. Pupils are equal, round, and reactive to light.   Neck: Normal range of motion.   Cardiovascular: Normal rate and regular rhythm.   Pulmonary/Chest: Breath sounds normal.   Abdominal: Soft. Bowel sounds are normal. There is no tenderness.   Genitourinary: Circumcised.         Lymphadenopathy:     He has no cervical adenopathy.   Neurological: He is alert and oriented to person, place, and time.   Skin: Skin is warm and dry. Capillary refill takes less than 2 seconds.   Psychiatric: He has a normal mood and affect.         ED Course   Procedures  Labs Reviewed - No data to display          Medical Decision Making:   Initial Assessment:   Urgent evaluation of a 47-year-old male with no significant medical history who presents to the emergency department with penis injury.  Patient is afebrile, nontoxic appearing, and hemodynamically stable.  On exam, on the dorsal aspect of the glans, there is a small puncture wound with surrounding ecchymosis.  Patient is not up-to-date on tetanus vaccination.  Dog is vaccinated.  Wound is cleaned.  Bactroban is applied.  Patient will be treated empirically  with augmentin.  Patient is advised to follow-up with PCP in 24-48 hours.  Patient is advised to return to the emergency department with any worsening symptoms or concerns.  Other:   I have discussed this case with another health care provider.       <> Summary of the Discussion: Dr. Huffman                      Clinical Impression:   The encounter diagnosis was Puncture wound of penis, initial encounter.                           Roxi Villa PA-C  03/02/18 1943

## 2018-03-03 NOTE — ED TRIAGE NOTES
"Pt states " my friends pit bull bit my penis. The tip of my penis was bleeding. I've been holding it with pressure and ice" pt denies injury to testicles. No loss of sensation/tingling to genitals.  Dime sized hematoma noted to tip of penis. Bleeding controlled   "

## 2018-06-04 ENCOUNTER — OFFICE VISIT (OUTPATIENT)
Dept: URGENT CARE | Facility: CLINIC | Age: 48
End: 2018-06-04
Payer: COMMERCIAL

## 2018-06-04 VITALS
BODY MASS INDEX: 29.82 KG/M2 | HEART RATE: 65 BPM | HEIGHT: 73 IN | TEMPERATURE: 97 F | WEIGHT: 225 LBS | DIASTOLIC BLOOD PRESSURE: 73 MMHG | OXYGEN SATURATION: 97 % | RESPIRATION RATE: 16 BRPM | SYSTOLIC BLOOD PRESSURE: 108 MMHG

## 2018-06-04 DIAGNOSIS — H60.501 ACUTE OTITIS EXTERNA OF RIGHT EAR, UNSPECIFIED TYPE: Primary | ICD-10-CM

## 2018-06-04 PROCEDURE — 99214 OFFICE O/P EST MOD 30 MIN: CPT | Mod: S$GLB,,, | Performed by: PHYSICIAN ASSISTANT

## 2018-06-04 RX ORDER — OFLOXACIN 3 MG/ML
10 SOLUTION AURICULAR (OTIC) 2 TIMES DAILY
Qty: 10 ML | Refills: 0 | Status: SHIPPED | OUTPATIENT
Start: 2018-06-04 | End: 2018-06-14

## 2018-06-04 NOTE — PATIENT INSTRUCTIONS
Apply antibiotic drops as prescribed.    Please follow up with your primary care provider within 2-5 days if your signs and symptoms have not resolved or worsen.     If your condition worsens or fails to improve we recommend that you receive another evaluation at the emergency room immediately or contact your primary medical clinic to discuss your concerns.   You must understand that you have received an Urgent Care treatment only and that you may be released before all of your medical problems are known or treated. You, the patient, will arrange for follow up care as instructed.         External Ear Infection (Adult)    External otitis (also called swimmers ear) is an infection in the ear canal. It is often caused by bacteria or fungus. It can occur a few days after water gets trapped in the ear canal (from swimming or bathing). It can also occur after cleaning too deeply in the ear canal with a cotton swab or other object. Sometimes, hair care products get into the ear canal and cause this problem.  Symptoms can include pain, fever, itching, redness, drainage, or swelling of the ear canal. Temporary hearing loss may also occur.  Home care  · Do not try to clean the ear canal. This can push pus and bacteria deeper into the canal.  · Use prescribed ear drops as directed. These help reduce swelling and fight the infection. If an ear wick was placed in the ear canal, apply drops right onto the end of the wick. The wick will draw the medication into the ear canal even if it is swollen closed.  · A cotton ball may be loosely placed in the outer ear to absorb any drainage.  · You may use acetaminophen or ibuprofen to control pain, unless another medication was prescribed. Note: If you have chronic liver or kidney disease or ever had a stomach ulcer or GI bleeding, talk to your health care provider before taking any of these medications.  · Do not allow water to get into your ear when bathing. Also, avoid swimming  until the infection has cleared.  Prevention  · Keep your ears dry. This helps lower the risk of infection. Dry your ears with a towel or hair dryer after getting wet. Also, use ear plugs when swimming.  · Do not stick any objects in the ear to remove wax.  · If you feel water trapped in your ear, use ear drops right away. You can get these drops over the counter at most drugstores. They work by removing water from the ear canal.  Follow-up care  Follow up with your health care provider in one week, or as advised.  When to seek medical advice  Call your health care provider right away if any of these occur:  · Ear pain becomes worse or doesnt improve after 3 days of treatment  · Redness or swelling of the outer ear occurs or gets worse  · Headache  · Painful or stiff neck  · Drowsiness or confusion  · Fever of 100.4ºF (38ºC) or higher, or as directed by your health care provider  · Seizure  Date Last Reviewed: 3/22/2015  © 9099-4088 [employer]. [address] [place] [address], [place] [address]. All rights reserved. This information is not intended as a substitute for professional medical care. Always follow your healthcare professional's instructions.

## 2018-06-04 NOTE — PROGRESS NOTES
"Subjective:       Patient ID: Erich Chery is a 47 y.o. male.    Vitals:  height is 6' 1" (1.854 m) and weight is 102.1 kg (225 lb). His oral temperature is 97.2 °F (36.2 °C). His blood pressure is 108/73 and his pulse is 65. His respiration is 16 and oxygen saturation is 97%.     Chief Complaint: Otalgia (Right Ear)    Otalgia    There is pain in the right ear. This is a new problem. The current episode started in the past 7 days. The problem occurs constantly. The problem has been gradually worsening. There has been no fever. The fever has been present for less than 1 day. The pain is at a severity of 7/10. The pain is moderate. Pertinent negatives include no abdominal pain, coughing, headaches or sore throat. He has tried heat packs for the symptoms. The treatment provided no relief.     Review of Systems   Constitution: Negative for chills, fever and malaise/fatigue.   HENT: Positive for ear pain. Negative for congestion, hoarse voice and sore throat.    Eyes: Negative for discharge and redness.   Cardiovascular: Negative for chest pain, dyspnea on exertion and leg swelling.   Respiratory: Negative for cough, shortness of breath, sputum production and wheezing.    Musculoskeletal: Negative for myalgias.   Gastrointestinal: Negative for abdominal pain and nausea.   Neurological: Negative for headaches.       Objective:      Physical Exam   Constitutional: He is oriented to person, place, and time. Vital signs are normal. He appears well-developed and well-nourished. He does not appear ill. No distress.   HENT:   Head: Normocephalic and atraumatic.   Right Ear: Hearing, tympanic membrane and external ear normal. There is swelling and tenderness. No drainage. Tympanic membrane is not bulging. No middle ear effusion.   Left Ear: Hearing, tympanic membrane, external ear and ear canal normal.   Nose: Mucosal edema present. Right sinus exhibits no maxillary sinus tenderness and no frontal sinus tenderness. Left " sinus exhibits no maxillary sinus tenderness and no frontal sinus tenderness.   Mouth/Throat: Uvula is midline. Posterior oropharyngeal erythema (cobblestoning) present. No oropharyngeal exudate or posterior oropharyngeal edema.   Erythematous edematous canal of right ear   Eyes: Conjunctivae, EOM and lids are normal. Right eye exhibits no discharge. Left eye exhibits no discharge.   Neck: Normal range of motion. Neck supple.   Cardiovascular: Normal rate, regular rhythm and normal heart sounds.  Exam reveals no gallop and no friction rub.    No murmur heard.  Pulmonary/Chest: Effort normal and breath sounds normal. No respiratory distress. He has no decreased breath sounds. He has no wheezes. He has no rhonchi. He has no rales.   Musculoskeletal: Normal range of motion.   Lymphadenopathy:        Head (right side): No submandibular and no tonsillar adenopathy present.        Head (left side): No submandibular and no tonsillar adenopathy present.   Neurological: He is alert and oriented to person, place, and time.   Skin: Skin is warm and dry. No rash noted. He is not diaphoretic. No erythema. No pallor.   Psychiatric: He has a normal mood and affect. His behavior is normal.   Nursing note and vitals reviewed.      Assessment:       1. Acute otitis externa of right ear, unspecified type        Plan:       Discussed treatment options with patient. Patient expressed verbal understanding and agreement with treatment plan.     Acute otitis externa of right ear, unspecified type  -     ofloxacin (FLOXIN) 0.3 % otic solution; Place 10 drops into the right ear 2 (two) times daily.  Dispense: 10 mL; Refill: 0      Patient Instructions   Apply antibiotic drops as prescribed.    Please follow up with your primary care provider within 2-5 days if your signs and symptoms have not resolved or worsen.     If your condition worsens or fails to improve we recommend that you receive another evaluation at the emergency room  immediately or contact your primary medical clinic to discuss your concerns.   You must understand that you have received an Urgent Care treatment only and that you may be released before all of your medical problems are known or treated. You, the patient, will arrange for follow up care as instructed.         External Ear Infection (Adult)    External otitis (also called swimmers ear) is an infection in the ear canal. It is often caused by bacteria or fungus. It can occur a few days after water gets trapped in the ear canal (from swimming or bathing). It can also occur after cleaning too deeply in the ear canal with a cotton swab or other object. Sometimes, hair care products get into the ear canal and cause this problem.  Symptoms can include pain, fever, itching, redness, drainage, or swelling of the ear canal. Temporary hearing loss may also occur.  Home care  · Do not try to clean the ear canal. This can push pus and bacteria deeper into the canal.  · Use prescribed ear drops as directed. These help reduce swelling and fight the infection. If an ear wick was placed in the ear canal, apply drops right onto the end of the wick. The wick will draw the medication into the ear canal even if it is swollen closed.  · A cotton ball may be loosely placed in the outer ear to absorb any drainage.  · You may use acetaminophen or ibuprofen to control pain, unless another medication was prescribed. Note: If you have chronic liver or kidney disease or ever had a stomach ulcer or GI bleeding, talk to your health care provider before taking any of these medications.  · Do not allow water to get into your ear when bathing. Also, avoid swimming until the infection has cleared.  Prevention  · Keep your ears dry. This helps lower the risk of infection. Dry your ears with a towel or hair dryer after getting wet. Also, use ear plugs when swimming.  · Do not stick any objects in the ear to remove wax.  · If you feel water trapped  in your ear, use ear drops right away. You can get these drops over the counter at most drugstores. They work by removing water from the ear canal.  Follow-up care  Follow up with your health care provider in one week, or as advised.  When to seek medical advice  Call your health care provider right away if any of these occur:  · Ear pain becomes worse or doesnt improve after 3 days of treatment  · Redness or swelling of the outer ear occurs or gets worse  · Headache  · Painful or stiff neck  · Drowsiness or confusion  · Fever of 100.4ºF (38ºC) or higher, or as directed by your health care provider  · Seizure  Date Last Reviewed: 3/22/2015  © 4442-6138 [employer]. [address] [place] [address], [place] [address]. All rights reserved. This information is not intended as a substitute for professional medical care. Always follow your healthcare professional's instructions.

## 2018-06-07 ENCOUNTER — TELEPHONE (OUTPATIENT)
Dept: URGENT CARE | Facility: CLINIC | Age: 48
End: 2018-06-07

## 2018-06-07 NOTE — TELEPHONE ENCOUNTER
Call Back    Patient return call back. Patient states his right ear is getting better. Patient states no questions at the present time.

## 2018-11-27 ENCOUNTER — OFFICE VISIT (OUTPATIENT)
Dept: INTERNAL MEDICINE | Facility: CLINIC | Age: 48
End: 2018-11-27
Payer: COMMERCIAL

## 2018-11-27 VITALS
BODY MASS INDEX: 31.24 KG/M2 | WEIGHT: 235.69 LBS | OXYGEN SATURATION: 98 % | HEART RATE: 62 BPM | HEIGHT: 73 IN | DIASTOLIC BLOOD PRESSURE: 80 MMHG | SYSTOLIC BLOOD PRESSURE: 118 MMHG

## 2018-11-27 DIAGNOSIS — R22.9 SUBCUTANEOUS MASS: ICD-10-CM

## 2018-11-27 DIAGNOSIS — E78.5 HYPERLIPIDEMIA, UNSPECIFIED HYPERLIPIDEMIA TYPE: ICD-10-CM

## 2018-11-27 DIAGNOSIS — A60.00 GENITAL HERPES SIMPLEX, UNSPECIFIED SITE: ICD-10-CM

## 2018-11-27 DIAGNOSIS — J34.2 DEVIATED SEPTUM: ICD-10-CM

## 2018-11-27 DIAGNOSIS — Z72.0 TOBACCO USE: ICD-10-CM

## 2018-11-27 DIAGNOSIS — Z00.00 PREVENTATIVE HEALTH CARE: ICD-10-CM

## 2018-11-27 PROCEDURE — 99999 PR PBB SHADOW E&M-EST. PATIENT-LVL IV: CPT | Mod: PBBFAC,,, | Performed by: FAMILY MEDICINE

## 2018-11-27 PROCEDURE — 3008F BODY MASS INDEX DOCD: CPT | Mod: CPTII,S$GLB,, | Performed by: FAMILY MEDICINE

## 2018-11-27 PROCEDURE — 99214 OFFICE O/P EST MOD 30 MIN: CPT | Mod: S$GLB,,, | Performed by: FAMILY MEDICINE

## 2018-11-28 PROBLEM — Z72.0 TOBACCO USE: Status: ACTIVE | Noted: 2018-11-28

## 2018-11-28 PROBLEM — A60.00 GENITAL HSV: Status: ACTIVE | Noted: 2018-11-28

## 2018-11-28 NOTE — PROGRESS NOTES
Subjective:      Patient ID: Erich Chery is a 48 y.o. male.    Chief Complaint: Annual Exam      HPI:  Erich Chery is a 48 year old male with asthma, chronic left knee pain, deviated septum, HSV, hyperlipidemia, obesity, and tobacco use who presents to clinic today to establish care.    Asthma:  Uses ProAir rescue inhaler approximately 2 times per year.  Symptoms stable.    Complains of a bump to the right anterior chest.  States a massage therapist noted this during a massage and told him it may possibly be a lymph node.  First noticed a couple of months ago.  States he sometimes feels it is larger at certain times.  Endorses some associated swelling to his right neck.  Denies overlying skin changes, unexpected weight loss, night sweats, significant fatigue.    Deviated septum:  States he would like to discuss surgical repair with an ENT.  Hesitant to proceed with surgery as he works as a  and is concerned this may alter his sense of smell/taste.  States he was told by an ENT in the past he had a deviated septum.  States he often wakes up with dry mouth/mucous membranes which he thinks may be caused by his deviated septum.  Enquires about balloon procedure.    HLD:  States he was previously on medication for this but stopped taking it as he did not want to be on a daily medicine after researching statins and stating that they only prevent one in 300 heart attacks.    Complains of weakness to the anterior aspect of the left knee.  States sometimes when he tries to balance on the left leg he feels the knee is weak.  States he performed a slide tackle while playing soccer 1 week ago and his symptoms developed subsequently to this.  Denies significant pain or bruising.    States he would like to be tested for STIs.  Denies any recent known exposure, rash, discharge, dysuria, fevers, chills, or genital lesions.  Sexually active with female partner, does not use barrier protection; unsure if she is on  contraception.  Endorses history of HSV; uses Valtrex for outbreaks; most recent outbreak 2 months ago.  States his partner is aware of his diagnosis.    Tobacco use:  Smokes approximately 10 cigarettes per week.  Trying to gradually cut down; contemplative stage.  Has tried Chantix in the past; does not want to use Chantix again.    Health Care Maintenance:  Influenza vaccination:  Refused.  Last tetanus booster:  3/2/18  Last routine labs:  5/17/17      Past Medical History:   Diagnosis Date    Asthma     Deviated septum     Genital herpes in men     Hyperlipidemia     Obesity        Past Surgical History:   Procedure Laterality Date    ARTHROSCOPY-KNEE Left 3/9/2016    [facility]    KNEE SURGERY      RECONSTRUCTION-LIGAMENT ANTERIOR CRUCIATE-ARTHROSCOPIC. BTB AUTOGRAFT Left 3/9/2016    Performed by Dre [facility] at [facility]       Family History   Problem Relation Age of Onset    Hyperlipidemia Father     Heart disease Father         CABG       Social History     Socioeconomic History    Marital status: Single     Spouse name: None    Number of children: None    Years of education: None    Highest education level: None   Social Needs    Financial resource strain: None    Food insecurity - worry: None    Food insecurity - inability: None    Transportation needs - medical: None    Transportation needs - non-medical: None   Occupational History    None   Tobacco Use    Smoking status: Current Some Day Smoker     Types: Cigarettes    Smokeless tobacco: Never Used    Tobacco comment: states he smokes approximately 10 cigarettes per week   Substance and Sexual Activity    Alcohol use: Yes     Frequency: 2-4 times a month     Drinks per session: 3 or 4    Drug use: None    Sexual activity: Yes     Partners: Female     Comment: Does not use barrier protection; does not know if his partner  uses any contraception   Other Topics Concern    None  "  Social History Narrative    None       Review of Systems   Constitutional: Negative for chills, fatigue and fever.   HENT: Negative for congestion, hearing loss, nosebleeds, rhinorrhea, sore throat and trouble swallowing.         + deviated septum.   Eyes: Negative for pain and visual disturbance.   Respiratory: Negative for cough, shortness of breath and wheezing.    Cardiovascular: Negative for chest pain and palpitations.   Gastrointestinal: Negative for abdominal distention, abdominal pain, constipation, diarrhea, nausea and vomiting.   Genitourinary: Negative for difficulty urinating, discharge, dysuria, frequency, hematuria and urgency.   Musculoskeletal: Negative for arthralgias, back pain and myalgias.        + Bump to right anterior chest.   Skin: Negative for color change and rash.   Neurological: Positive for weakness. Negative for dizziness, syncope, speech difficulty, numbness and headaches.   Psychiatric/Behavioral: Negative for agitation, behavioral problems and confusion. The patient is not nervous/anxious.      Objective:     Vitals:    11/27/18 0940   BP: 118/80   BP Location: Left arm   Patient Position: Sitting   BP Method: Large (Manual)   Pulse: 62   SpO2: 98%   Weight: 106.9 kg (235 lb 10.8 oz)   Height: 6' 1" (1.854 m)       Physical Exam   Constitutional: He appears well-developed and well-nourished. He is cooperative. No distress.   HENT:   Head: Normocephalic and atraumatic.   Right Ear: Hearing and external ear normal.   Left Ear: Hearing and external ear normal.   Nose: Septal deviation present. No rhinorrhea. No epistaxis.   Mouth/Throat: Oropharynx is clear and moist and mucous membranes are normal. No oral lesions.   Eyes: Conjunctivae, EOM and lids are normal. Pupils are equal, round, and reactive to light. Right eye exhibits no discharge. Left eye exhibits no discharge.   Neck: Trachea normal and normal range of motion. Neck supple. No tracheal deviation present. "   Cardiovascular: Normal rate, regular rhythm and normal heart sounds. Exam reveals no gallop and no friction rub.   No murmur heard.  Pulmonary/Chest: Effort normal and breath sounds normal. No respiratory distress. He has no wheezes. He has no rales.   Abdominal: Soft. Bowel sounds are normal. He exhibits no distension. There is no tenderness. There is no rebound and no guarding.   Musculoskeletal: Normal range of motion. He exhibits no edema or deformity.        Left knee: He exhibits normal range of motion, no swelling, no effusion, no ecchymosis, no deformity, no erythema, normal alignment, no LCL laxity, normal patellar mobility, no bony tenderness, normal meniscus and no MCL laxity. No tenderness found.        Arms:  Lymphadenopathy:     He has no cervical adenopathy.   Neurological: He is alert. No cranial nerve deficit. He exhibits normal muscle tone.   Skin: Skin is warm and dry. No rash noted.   Psychiatric: He has a normal mood and affect. His speech is normal and behavior is normal. Judgment and thought content normal. Cognition and memory are normal.   Nursing note and vitals reviewed.     Assessment:      1. Deviated septum    2. Genital herpes simplex, unspecified site    3. Hyperlipidemia, unspecified hyperlipidemia type    4. Preventative health care    5. Subcutaneous mass    6. Tobacco use      Plan:   Erich was seen today for annual exam.    Diagnoses and all orders for this visit:    Deviated septum  -     Ambulatory Referral to ENT    Genital herpes simplex, unspecified site        -     Stable, continue current regimen for outbreaks.  Encouraged abstinence from sex while having active outbreaks.  Patient states his partner is aware of his diagnosis.    Hyperlipidemia, unspecified hyperlipidemia type        -     Lipid panel.  Consider restarting statin pending results if patient amenable.    Preventative health care  -     CBC auto differential; Future  -     Comprehensive metabolic panel;  Future  -     Lipid panel; Future  -     HIV-1 and HIV-2 antibodies; Future  -     RPR; Future  -     Cancel: C. trachomatis/N. gonorrhoeae by AMP DNA [facility]; Urine  -     HEPATITIS PANEL, ACUTE; Future  -     C. trachomatis/N. gonorrhoeae by AMP DNA [facility]; Urine; Future    Subcutaneous mass        -     No concerning findings on history or exam.  Offered ultrasound for further evaluation; declined by patient at this time.  Instructed patient to call or return to clinic for any changes or worsening.    Tobacco use        -     Contemplative stage.  Counseled patient on the importance of tobacco cessation.  Call or return to clinic if wanting to discuss cessation strategies.

## 2018-11-29 ENCOUNTER — LAB VISIT (OUTPATIENT)
Dept: LAB | Facility: OTHER | Age: 48
End: 2018-11-29
Attending: FAMILY MEDICINE
Payer: COMMERCIAL

## 2018-11-29 DIAGNOSIS — Z00.00 PREVENTATIVE HEALTH CARE: ICD-10-CM

## 2018-11-29 LAB
ALBUMIN SERPL BCP-MCNC: 4.1 G/DL
ALP SERPL-CCNC: 55 U/L
ALT SERPL W/O P-5'-P-CCNC: 32 U/L
ANION GAP SERPL CALC-SCNC: 11 MMOL/L
AST SERPL-CCNC: 22 U/L
BASOPHILS # BLD AUTO: 0.04 K/UL
BASOPHILS NFR BLD: 0.7 %
BILIRUB SERPL-MCNC: 0.6 MG/DL
BUN SERPL-MCNC: 19 MG/DL
CALCIUM SERPL-MCNC: 9.7 MG/DL
CHLORIDE SERPL-SCNC: 104 MMOL/L
CHOLEST SERPL-MCNC: 272 MG/DL
CHOLEST/HDLC SERPL: 7.8 {RATIO}
CO2 SERPL-SCNC: 26 MMOL/L
CREAT SERPL-MCNC: 1.1 MG/DL
DIFFERENTIAL METHOD: ABNORMAL
EOSINOPHIL # BLD AUTO: 0.4 K/UL
EOSINOPHIL NFR BLD: 7.3 %
ERYTHROCYTE [DISTWIDTH] IN BLOOD BY AUTOMATED COUNT: 12.5 %
EST. GFR  (AFRICAN AMERICAN): >60 ML/MIN/1.73 M^2
EST. GFR  (NON AFRICAN AMERICAN): >60 ML/MIN/1.73 M^2
GLUCOSE SERPL-MCNC: 92 MG/DL
HAV IGM SERPL QL IA: NEGATIVE
HBV CORE IGM SERPL QL IA: NEGATIVE
HBV SURFACE AG SERPL QL IA: NEGATIVE
HCT VFR BLD AUTO: 47.2 %
HCV AB SERPL QL IA: NEGATIVE
HDLC SERPL-MCNC: 35 MG/DL
HDLC SERPL: 12.9 %
HGB BLD-MCNC: 16 G/DL
HIV 1+2 AB+HIV1 P24 AG SERPL QL IA: NEGATIVE
LDLC SERPL CALC-MCNC: 157.2 MG/DL
LYMPHOCYTES # BLD AUTO: 1.9 K/UL
LYMPHOCYTES NFR BLD: 31.8 %
MCH RBC QN AUTO: 31.4 PG
MCHC RBC AUTO-ENTMCNC: 33.9 G/DL
MCV RBC AUTO: 93 FL
MONOCYTES # BLD AUTO: 0.7 K/UL
MONOCYTES NFR BLD: 12 %
NEUTROPHILS # BLD AUTO: 2.8 K/UL
NEUTROPHILS NFR BLD: 48 %
NONHDLC SERPL-MCNC: 237 MG/DL
PLATELET # BLD AUTO: 265 K/UL
PMV BLD AUTO: 9.8 FL
POTASSIUM SERPL-SCNC: 4.3 MMOL/L
PROT SERPL-MCNC: 7.5 G/DL
RBC # BLD AUTO: 5.1 M/UL
RPR SER QL: NORMAL
SODIUM SERPL-SCNC: 141 MMOL/L
TRIGL SERPL-MCNC: 399 MG/DL
WBC # BLD AUTO: 5.91 K/UL

## 2018-11-29 PROCEDURE — 36415 COLL VENOUS BLD VENIPUNCTURE: CPT

## 2018-11-29 PROCEDURE — 86703 HIV-1/HIV-2 1 RESULT ANTBDY: CPT

## 2018-11-29 PROCEDURE — 80061 LIPID PANEL: CPT

## 2018-11-29 PROCEDURE — 85025 COMPLETE CBC W/AUTO DIFF WBC: CPT

## 2018-11-29 PROCEDURE — 80074 ACUTE HEPATITIS PANEL: CPT

## 2018-11-29 PROCEDURE — 86592 SYPHILIS TEST NON-TREP QUAL: CPT

## 2018-11-29 PROCEDURE — 80053 COMPREHEN METABOLIC PANEL: CPT

## 2019-03-26 ENCOUNTER — TELEPHONE (OUTPATIENT)
Dept: SPORTS MEDICINE | Facility: CLINIC | Age: 49
End: 2019-03-26

## 2019-04-01 ENCOUNTER — HOSPITAL ENCOUNTER (OUTPATIENT)
Dept: RADIOLOGY | Facility: HOSPITAL | Age: 49
Discharge: HOME | End: 2019-04-01
Attending: ORTHOPAEDIC SURGERY
Payer: COMMERCIAL

## 2019-04-01 ENCOUNTER — OFFICE VISIT (OUTPATIENT)
Dept: SPORTS MEDICINE | Facility: CLINIC | Age: 49
End: 2019-04-01
Payer: COMMERCIAL

## 2019-04-01 VITALS
HEIGHT: 73 IN | DIASTOLIC BLOOD PRESSURE: 78 MMHG | BODY MASS INDEX: 31.14 KG/M2 | SYSTOLIC BLOOD PRESSURE: 119 MMHG | WEIGHT: 235 LBS | HEART RATE: 62 BPM

## 2019-04-01 DIAGNOSIS — M25.521 RIGHT ELBOW PAIN: Primary | ICD-10-CM

## 2019-04-01 DIAGNOSIS — M75.21 BICEPS TENDONITIS, RIGHT: ICD-10-CM

## 2019-04-01 DIAGNOSIS — M77.11 LATERAL EPICONDYLITIS OF RIGHT ELBOW: ICD-10-CM

## 2019-04-01 DIAGNOSIS — M25.521 RIGHT ELBOW PAIN: ICD-10-CM

## 2019-04-01 PROCEDURE — 73080 X-RAY EXAM OF ELBOW: CPT | Mod: TC,FY,PO,RT

## 2019-04-01 PROCEDURE — 99203 PR OFFICE/OUTPT VISIT, NEW, LEVL III, 30-44 MIN: ICD-10-PCS | Mod: S$GLB,,, | Performed by: ORTHOPAEDIC SURGERY

## 2019-04-01 PROCEDURE — 73080 X-RAY EXAM OF ELBOW: CPT | Mod: 26,RT,, | Performed by: RADIOLOGY

## 2019-04-01 PROCEDURE — 73080 XR ELBOW COMPLETE 3 VIEW RIGHT: ICD-10-PCS | Mod: 26,RT,, | Performed by: RADIOLOGY

## 2019-04-01 PROCEDURE — 99999 PR PBB SHADOW E&M-EST. PATIENT-LVL III: ICD-10-PCS | Mod: PBBFAC,,, | Performed by: ORTHOPAEDIC SURGERY

## 2019-04-01 PROCEDURE — 99999 PR PBB SHADOW E&M-EST. PATIENT-LVL III: CPT | Mod: PBBFAC,,, | Performed by: ORTHOPAEDIC SURGERY

## 2019-04-01 PROCEDURE — 99203 OFFICE O/P NEW LOW 30 MIN: CPT | Mod: S$GLB,,, | Performed by: ORTHOPAEDIC SURGERY

## 2019-04-01 PROCEDURE — 3008F BODY MASS INDEX DOCD: CPT | Mod: CPTII,S$GLB,, | Performed by: ORTHOPAEDIC SURGERY

## 2019-04-01 PROCEDURE — 3008F PR BODY MASS INDEX (BMI) DOCUMENTED: ICD-10-PCS | Mod: CPTII,S$GLB,, | Performed by: ORTHOPAEDIC SURGERY

## 2019-04-01 NOTE — PROGRESS NOTES
CC Right elbow pain    HPI:   Erich Chery is a pleasant 48 y.o. patient who reports to clinic with right lateral elbow pain.  He also notes some anterior elbow pain as well. He states the pain started about 1.5 months ago when he was playing disc golf.  He was also working with a  and feels that may have irritated as well.  The pain was worse 1 month ago while skiing.  It was bothering him when drinking coffee.  He feels that the pain is starting to get better over the past few weeks but there still times with the bothers him.  He has tried rest and activity modification which has helped some.    Today the patient rates pain at a 2/10 on visual analog scale.      1.5 months duration     Affecting ADLS    Worse when doing activites: disc golf, skiing, drinking coffee      Review of Systems   Constitution: Negative. Negative for chills, fever and night sweats.   HENT: Negative for congestion and headaches.    Eyes: Negative for blurred vision, left vision loss and right vision loss.   Cardiovascular: Negative for chest pain and syncope.   Respiratory: Negative for cough and shortness of breath.    Endocrine: Negative for polydipsia, polyphagia and polyuria.   Hematologic/Lymphatic: Negative for bleeding problem. Does not bruise/bleed easily.   Skin: Negative for dry skin, itching and rash.   Musculoskeletal: Negative for falls and muscle weakness.   Gastrointestinal: Negative for abdominal pain and bowel incontinence.   Genitourinary: Negative for bladder incontinence and nocturia.   Neurological: Negative for disturbances in coordination, loss of balance and seizures.   Psychiatric/Behavioral: Negative for depression. The patient does not have insomnia.    Allergic/Immunologic: Negative for hives and persistent infections.     PAST MEDICAL HISTORY:   Past Medical History:   Diagnosis Date    Asthma     Deviated septum     Genital herpes in men     Hyperlipidemia     Obesity      PAST  SURGICAL HISTORY:   Past Surgical History:   Procedure Laterality Date    ARTHROSCOPY-KNEE Left 3/9/2016    [facility]    KNEE SURGERY      RECONSTRUCTION-LIGAMENT ANTERIOR CRUCIATE-ARTHROSCOPIC. BTB AUTOGRAFT Left 3/9/2016    [facility] at [facility] OR     FAMILY HISTORY:   Family History   Problem Relation Age of Onset    Hyperlipidemia Father     Heart disease Father         CABG     SOCIAL HISTORY:   Social History     Socioeconomic History    Marital status: Single     Spouse name: Not on file    Number of children: Not on file    Years of education: Not on file    Highest education level: Not on file   Occupational History    Not on file   Social Needs    Financial resource strain: Not on file    Food insecurity:     Worry: Not on file     Inability: Not on file    Transportation needs:     Medical: Not on file     Non-medical: Not on file   Tobacco Use    Smoking status: Current Some Day Smoker     Types: Cigarettes    Smokeless tobacco: Never Used    Tobacco comment: states he smokes approximately 10 cigarettes per week   Substance and Sexual Activity    Alcohol use: Yes     Frequency: 2-4 times a month     Drinks per session: 3 or 4    Drug use: Not on file    Sexual activity: Yes     Partners: Female     Comment: Does not use barrier protection; does not know if his partner  uses any contraception   Lifestyle    Physical activity:     Days per week: Not on file     Minutes per session: Not on file    Stress: Not on file   Relationships    Social connections:     Talks on phone: Not on file     Gets together: Not on file     Attends Baptism service: Not on file     Active member of club or organization: Not on file     Attends meetings of clubs or organizations: Not on file     Relationship status: Not on file    Intimate partner violence:     Fear of current or ex partner: Not on file     Emotionally abused: Not on file      "Physically abused: Not on file     Forced sexual activity: Not on file   Other Topics Concern    Not on file   Social History Narrative    Not on file       MEDICATIONS:   Current Outpatient Medications:     PROAIR HFA 90 mcg/actuation inhaler, inhale 2 puffs every 6 hours if needed for wheezing, Disp: 8.5 Inhaler, Rfl: 0  ALLERGIES: Review of patient's allergies indicates:  No Known Allergies    VITAL SIGNS: /78   Pulse 62   Ht 6' 1" (1.854 m)   Wt 106.6 kg (235 lb)   BMI 31.00 kg/m²        PHYSICAL EXAM:  General: Patient appears alert and oriented x 3.  Mood is pleasant.  Observation of ears, eyes and nose reveal no gross abnormalities. Observation of ears, eyes and nose reveal no gross abnormalities.  HEENT: NCAT, sclera nonicteric.  Well nourished, in no acute distress and ambulates with a non-antalgic gait with no assistive devices.    Skin: Skin intact bilaterally. Sensation intact bilaterally. Compartments soft. No evidence of edema, infection, or induration.     Right ELBOW / WRIST EXTREMITY EXAM:    OBSERVATION / INSPECTION    Swelling  none    Deformity  none  Discoloration  none     Scars   none    Atrophy  none    TENDERNESS / CREPITUS (T / C):           T / C        Medial epicondyle   - / -    Med. (Ulnar) collateral ligament  - / -    Flexor pronator Musculature   - / -   Biceps tendon    + / -   Head of radius    - / -    Lateral epicondyle   + / -    Extensor Musculature   - / -   Brachioradialis   - / -   Triceps tendon   - / -   Triceps muscle   - / -   Olecranon    - / -   Olecranon bursa   - / -   Cubital fossa    - / -   Anterior jointline   - / -   Radial tunnel    - / -             ROM: ('*' = with pain)    Right Elbow  AROM (PROM)     Extension   0 deg  (5 deg)   Flexion   145 deg (145 deg)         Pronation  90 deg  (90 deg)      Supination   80 deg  (80 deg)                 Left Elbow  AROM (PROM)     Extension   0 deg  (5 deg)   Flexion   145 deg (145 deg)       "   Pronation  90 deg  (90 deg)      Supination   80 deg  (80 deg)            Right Wrist  AROM (PROM)   Extension   80 deg (85 deg)   Flexion   80 deg (85 deg)         Ulnar Deviation   35 deg (40 deg)  Radial Deviation 35 deg (40 deg)             Left Wrist   AROM (PROM)     Extension   80 deg (85 deg)   Flexion   80 deg (85 deg)         Ulnar Deviation   35 deg (40 deg)  Radial Deviation 35 deg (40 deg)        STRENGTH: ('*' = with pain)    Elbow Flexion:   5/5  Elbow Extension:  5/5  Wrist Flexion:   5/5  Wrist Extension:  5/5  :    5/5  Intrinsics:   5/5  EPL (Ext. pollicis longus): 5/5  Pinch Mechanism:  5/5    ELBOW EXAMINATION:  See above noted areas of tenderness.   Test for Ligamentous Instability - UCL normal  Test for Ligamentous Instability - LUCL normal  PLRI       neg  Tinel's (Percussion) Test - Cubital  neg  Tennis Elbow Test    +  Golfer's Elbow Test    neg  Radial Capitellar Grind Test   neg  Valgus/Extension Overload Test  neg  Resisted Long Finger Extension Pain -  Moving Valgus     neg  Forearm pain with resisted supination neg    WRIST EXAMINATION:  See above noted areas of tenderness.   Finkelstein's Test   neg  Tinel's Test - Carpal Tunnel  neg  Phalen's Test    neg  Median Nerve Compression Test neg  Ulnar-sided Compression Test neg  LT Ballottment Test   neg  Snuff box tenderness   neg  Zaidi's Test   neg  LT Instability    neg  Hook of Hamate Tenderness  neg     EXTREMITY NEURO-VASCULAR EXAMINATION: Sensation grossly intact to light touch all dermatomal regions. DTR 2+ Biceps, Triceps, BR and Negative Lindsay's sign. Grossly intact motor function at Elbow, Wrist and Hand. Distal pulses radial and ulnar 2+, brisk cap refill, symmetric.    Other Findings:  Pain with resisted supination    Xrays: (AP, lateral, oblique) Right elbow ordered and reviewed by me personally today: no evidence of fracture or dislocation.  Osseous structures appear intact.  No evidence of degenerative joint  disease.      ASSESSMENT:  Right elbow pain, lateral epicondylitis  Right elbow distal biceps tendinitis      PLAN:  Leo Twist bar  Stretches instructed by me  OT with Abbey Foster at [facility], ordered placed  HEP 31499 - I, instructed and demonstrated a tennis elbow HEP. The patient then demonstrated understanding of exercises and proper technique. This program was performed for 16 minutes.   NSAIDs    conservative management to include injection today

## 2019-07-09 ENCOUNTER — TELEPHONE (OUTPATIENT)
Dept: SPORTS MEDICINE | Facility: CLINIC | Age: 49
End: 2019-07-09

## 2019-07-09 NOTE — TELEPHONE ENCOUNTER
Tried calling patient to offer an appointment to see Dr Jackson for re-eval for elbow pain. Not able to leave a message.

## 2019-08-29 ENCOUNTER — PATIENT MESSAGE (OUTPATIENT)
Dept: INTERNAL MEDICINE | Facility: CLINIC | Age: 49
End: 2019-08-29

## 2019-08-29 ENCOUNTER — TELEPHONE (OUTPATIENT)
Dept: INTERNAL MEDICINE | Facility: CLINIC | Age: 49
End: 2019-08-29

## 2019-08-29 DIAGNOSIS — R09.81 NASAL CONGESTION: ICD-10-CM

## 2019-08-29 DIAGNOSIS — A60.00 GENITAL HERPES SIMPLEX, UNSPECIFIED SITE: Primary | ICD-10-CM

## 2019-08-29 DIAGNOSIS — J34.2 DEVIATED SEPTUM: ICD-10-CM

## 2019-08-29 RX ORDER — VALACYCLOVIR HYDROCHLORIDE 1 G/1
1000 TABLET, FILM COATED ORAL DAILY
Qty: 5 TABLET | Refills: 6 | Status: SHIPPED | OUTPATIENT
Start: 2019-08-29 | End: 2020-01-20 | Stop reason: SDUPTHER

## 2019-08-29 RX ORDER — FLUTICASONE PROPIONATE 50 MCG
1 SPRAY, SUSPENSION (ML) NASAL DAILY
Qty: 15.8 ML | Refills: 6 | Status: SHIPPED | OUTPATIENT
Start: 2019-08-29 | End: 2022-12-28

## 2019-09-13 ENCOUNTER — OFFICE VISIT (OUTPATIENT)
Dept: INTERNAL MEDICINE | Facility: CLINIC | Age: 49
End: 2019-09-13
Payer: COMMERCIAL

## 2019-09-13 VITALS
BODY MASS INDEX: 31.09 KG/M2 | SYSTOLIC BLOOD PRESSURE: 108 MMHG | HEIGHT: 73 IN | TEMPERATURE: 98 F | OXYGEN SATURATION: 96 % | WEIGHT: 234.56 LBS | HEART RATE: 69 BPM | DIASTOLIC BLOOD PRESSURE: 80 MMHG

## 2019-09-13 DIAGNOSIS — Z00.00 ANNUAL PHYSICAL EXAM: Primary | ICD-10-CM

## 2019-09-13 DIAGNOSIS — E78.2 MIXED HYPERLIPIDEMIA: ICD-10-CM

## 2019-09-13 DIAGNOSIS — M79.671 BILATERAL FOOT PAIN: ICD-10-CM

## 2019-09-13 DIAGNOSIS — R06.83 SNORING: ICD-10-CM

## 2019-09-13 DIAGNOSIS — E66.09 CLASS 1 OBESITY DUE TO EXCESS CALORIES WITH SERIOUS COMORBIDITY AND BODY MASS INDEX (BMI) OF 30.0 TO 30.9 IN ADULT: ICD-10-CM

## 2019-09-13 DIAGNOSIS — R06.89 BREATHING DIFFICULTY: ICD-10-CM

## 2019-09-13 DIAGNOSIS — M79.672 BILATERAL FOOT PAIN: ICD-10-CM

## 2019-09-13 DIAGNOSIS — R53.82 CHRONIC FATIGUE: ICD-10-CM

## 2019-09-13 PROBLEM — E66.811 CLASS 1 OBESITY DUE TO EXCESS CALORIES WITH SERIOUS COMORBIDITY AND BODY MASS INDEX (BMI) OF 30.0 TO 30.9 IN ADULT: Status: ACTIVE | Noted: 2019-09-13

## 2019-09-13 PROBLEM — Z72.0 TOBACCO USE: Status: RESOLVED | Noted: 2018-11-28 | Resolved: 2019-09-13

## 2019-09-13 PROCEDURE — 99214 PR OFFICE/OUTPT VISIT, EST, LEVL IV, 30-39 MIN: ICD-10-PCS | Mod: S$GLB,,, | Performed by: FAMILY MEDICINE

## 2019-09-13 PROCEDURE — 3008F PR BODY MASS INDEX (BMI) DOCUMENTED: ICD-10-PCS | Mod: CPTII,S$GLB,, | Performed by: FAMILY MEDICINE

## 2019-09-13 PROCEDURE — 3008F BODY MASS INDEX DOCD: CPT | Mod: CPTII,S$GLB,, | Performed by: FAMILY MEDICINE

## 2019-09-13 PROCEDURE — 99214 OFFICE O/P EST MOD 30 MIN: CPT | Mod: S$GLB,,, | Performed by: FAMILY MEDICINE

## 2019-09-13 PROCEDURE — 99999 PR PBB SHADOW E&M-EST. PATIENT-LVL IV: ICD-10-PCS | Mod: PBBFAC,,, | Performed by: FAMILY MEDICINE

## 2019-09-13 PROCEDURE — 99999 PR PBB SHADOW E&M-EST. PATIENT-LVL IV: CPT | Mod: PBBFAC,,, | Performed by: FAMILY MEDICINE

## 2019-09-13 RX ORDER — ALBUTEROL SULFATE 90 UG/1
AEROSOL, METERED RESPIRATORY (INHALATION)
Qty: 8.5 INHALER | Refills: 6 | Status: SHIPPED | OUTPATIENT
Start: 2019-09-13 | End: 2021-09-20

## 2019-09-13 NOTE — PROGRESS NOTES
"Subjective:      Patient ID: Erich Chery is a 48 y.o. male.    Chief Complaint: Annual Exam      HPI:  Erich Chery is a 48 year old male with asthma, chronic left knee pain, deviated septum, HSV, hyperlipidemia, obesity, and tobacco use who presents to clinic today for a physical exam and complaining of difficulty sleeping and difficulty with deep breathing.    Difficulty sleeping:  States this has been "going on for a while."  Endorses associated low energy.  Wakes up in the middle of the night with open and dry mouth.  States he does snore.  States he has not been evaluated for sleep apnea but does not want to use any facial mask to sleep.  Does not want any sleep medication.  Denies difficulty getting to sleep, mainly difficulty staying asleep.    Difficulty breathing:  States he has been having difficulty getting deep breaths, denies respiratory distress.    States he hurt his heels playing soccer about 1 month ago.  Would like to play soccer again.  States he played soccer again and his heels hurt again.  Thinks he may have plantar fasciitis, endorses history of this.  States he prefers to avoid medications.  States the pain makes it hard to run.  Just started wearing shoe inserts for the past couple of days with improvement.    Asthma:  Uses ProAir rescue inhaler approximately 2 times per year.  Symptoms stable.  Has been using more frequently at night with improved sleep.  Diagnosed aged [age], diagnosed without PFTs in the past.    HLD:  States he was previously on medication for this but stopped taking it as he did not want to be on a daily medicine after researching statins and stating that "they only prevent one in 300 heart attacks."  Total cholesterol 272, triglycerides 399, HDL 35 on Lipid panel 11/29/18.  10 year risk score 13% based off of these results.    Obesity:  BMI 30.95.  States he walks 2 miles three times per week.    Tobacco use:  Discontinued cigarettes 2.5 months ago.     Health " Care Maintenance:  Influenza vaccination:  Refused  Last tetanus booster:  3/2/18  Last routine labs:  11/29/18      Past Medical History:   Diagnosis Date    Asthma     Deviated septum     Genital herpes in men     Hyperlipidemia     Obesity     Tobacco use 11/28/2018       Past Surgical History:   Procedure Laterality Date    ARTHROSCOPY-KNEE Left 3/9/2016    [facility]    KNEE SURGERY      RECONSTRUCTION-LIGAMENT ANTERIOR CRUCIATE-ARTHROSCOPIC. BTB AUTOGRAFT Left 3/9/2016    Performed by Dre Hamilton [facility] at [facility] OR       Family History   Problem Relation Age of Onset    Hyperlipidemia Father     Heart disease Father         CABG       Social History     Socioeconomic History    Marital status: Single     Spouse name: Not on file    Number of children: Not on file    Years of education: Not on file    Highest education level: Not on file   Occupational History    Not on file   Social Needs    Financial resource strain: Not on file    Food insecurity:     Worry: Not on file     Inability: Not on file    Transportation needs:     Medical: Not on file     Non-medical: Not on file   Tobacco Use    Smoking status: Current Some Day Smoker     Types: Cigarettes    Smokeless tobacco: Never Used    Tobacco comment: states he smokes approximately 10 cigarettes per week   Substance and Sexual Activity    Alcohol use: Yes     Frequency: 2-4 times a month     Drinks per session: 3 or 4    Drug use: Not on file    Sexual activity: Yes     Partners: Female     Comment: Does not use barrier protection; does not know if his partner  uses any contraception   Lifestyle    Physical activity:     Days per week: Not on file     Minutes per session: Not on file    Stress: Not on file   Relationships    Social connections:     Talks on phone: Not on file     Gets together: Not on file     Attends Latter-day service: Not on file     Active member of club or  "organization: Not on file     Attends meetings of clubs or organizations: Not on file     Relationship status: Not on file   Other Topics Concern    Not on file   Social History Narrative    Not on file       Review of Systems   Constitutional: Positive for fatigue. Negative for chills and fever.   HENT: Negative for congestion, hearing loss, nosebleeds, rhinorrhea, sore throat and trouble swallowing.    Eyes: Negative for pain and visual disturbance.   Respiratory: Negative for cough, shortness of breath and wheezing.         + Dyspnea   Cardiovascular: Negative for chest pain and palpitations.   Gastrointestinal: Negative for abdominal distention, abdominal pain, constipation, diarrhea, nausea and vomiting.   Genitourinary: Negative for difficulty urinating, dysuria, frequency, hematuria and urgency.   Musculoskeletal: Positive for arthralgias. Negative for back pain and myalgias.   Skin: Negative for color change and rash.   Neurological: Negative for dizziness, syncope, speech difficulty, weakness, numbness and headaches.   Psychiatric/Behavioral: Positive for sleep disturbance. Negative for agitation, behavioral problems and confusion. The patient is not nervous/anxious.      Objective:     Vitals:    09/13/19 1027   BP: 108/80   BP Location: Left arm   Patient Position: Sitting   BP Method: Medium (Manual)   Pulse: 69   Temp: 98.3 °F (36.8 °C)   TempSrc: Oral   SpO2: 96%   Weight: 106.4 kg (234 lb 9.1 oz)   Height: 6' 1" (1.854 m)       Physical Exam   Constitutional: He appears well-developed and well-nourished. He is cooperative. No distress.   HENT:   Head: Normocephalic and atraumatic.   Right Ear: Hearing and external ear normal.   Left Ear: Hearing and external ear normal.   Nose: Nose normal. No rhinorrhea. No epistaxis.   Mouth/Throat: Oropharynx is clear and moist and mucous membranes are normal. No oral lesions. No oropharyngeal exudate, posterior oropharyngeal edema, posterior oropharyngeal " erythema or tonsillar abscesses. Tonsils are 1+ on the right. Tonsils are 1+ on the left.   Eyes: Pupils are equal, round, and reactive to light. Conjunctivae, EOM and lids are normal. Right eye exhibits no discharge. Left eye exhibits no discharge.   Neck: Trachea normal and normal range of motion. Neck supple. No tracheal deviation present.   Cardiovascular: Normal rate, regular rhythm and normal heart sounds. Exam reveals no gallop and no friction rub.   No murmur heard.  Pulmonary/Chest: Effort normal and breath sounds normal. No respiratory distress. He has no wheezes. He has no rales.   Abdominal: Soft. Bowel sounds are normal. He exhibits no distension. There is no tenderness. There is no rebound and no guarding.   Musculoskeletal: Normal range of motion. He exhibits no edema or deformity.        Right foot: There is no tenderness, no bony tenderness and no swelling.        Left foot: There is no tenderness, no bony tenderness and no swelling.   Neurological: He is alert. No cranial nerve deficit. He exhibits normal muscle tone.   Skin: Skin is warm and dry. No rash noted.   Psychiatric: He has a normal mood and affect. His speech is normal and behavior is normal. Judgment and thought content normal. Cognition and memory are normal.   Nursing note and vitals reviewed.     Assessment:      1. Annual physical exam    2. Bilateral foot pain    3. Breathing difficulty    4. Chronic fatigue    5. Mixed hyperlipidemia    6. Class 1 obesity due to excess calories with serious comorbidity and body mass index (BMI) of 30.0 to 30.9 in adult    7. Snoring      Plan:   Erich was seen today for annual exam.    Diagnoses and all orders for this visit:    Annual physical exam  -     Lipid panel; Future  -     Comprehensive metabolic panel; Future  -     CBC auto differential; Future    Bilateral foot pain  -     Ambulatory Referral to Podiatry  -     Suspect plantar fasciitis.  Recommended stretching exercises-hand out  provided, OTC NSAIDs with food PRN, shoe inserts with arch support.    Breathing difficulty  -     Complete PFT with bronchodilator; Future  -     PULSE OXIMETRY WITH REST - PULM; Future  -     albuterol (PROAIR HFA) 90 mcg/actuation inhaler; inhale 2 puffs every 6 hours if needed for wheezing    Chronic fatigue  -     Ambulatory referral to Sleep Disorders for ARTUR work up    Mixed hyperlipidemia        -     Patient states he prefers for recheck of lipid prior to starting any therapy for this.  Recommend a low cholesterol diet such as the Mediterranean style diet--overall decreased meat consumption, substituting lean proteins like baked fish/poultry instead of red meats, overall increased vegetable consumption, and substituting healthy oils like extra virgin olive oil in the place of butters/grease.  Recommend daily aerobic exercise.    Class 1 obesity due to excess calories with serious comorbidity and body mass index (BMI) of 30.0 to 30.9 in adult  -     Ambulatory referral to Sleep Disorders  -     Counseled patient on the importance of a healthy low calorie diet, increased daily aerobic exercise, and weight loss.    Snoring  -     Ambulatory referral to Sleep Disorders

## 2019-09-18 ENCOUNTER — LAB VISIT (OUTPATIENT)
Dept: LAB | Facility: HOSPITAL | Age: 49
End: 2019-09-18
Payer: COMMERCIAL

## 2019-09-18 DIAGNOSIS — Z00.00 ANNUAL PHYSICAL EXAM: ICD-10-CM

## 2019-09-18 LAB
ALBUMIN SERPL BCP-MCNC: 4 G/DL (ref 3.5–5.2)
ALP SERPL-CCNC: 53 U/L (ref 55–135)
ALT SERPL W/O P-5'-P-CCNC: 30 U/L (ref 10–44)
ANION GAP SERPL CALC-SCNC: 8 MMOL/L (ref 8–16)
AST SERPL-CCNC: 20 U/L (ref 10–40)
BASOPHILS # BLD AUTO: 0.08 K/UL (ref 0–0.2)
BASOPHILS NFR BLD: 1.3 % (ref 0–1.9)
BILIRUB SERPL-MCNC: 0.5 MG/DL (ref 0.1–1)
BUN SERPL-MCNC: 18 MG/DL (ref 6–20)
CALCIUM SERPL-MCNC: 9.4 MG/DL (ref 8.7–10.5)
CHLORIDE SERPL-SCNC: 104 MMOL/L (ref 95–110)
CHOLEST SERPL-MCNC: 261 MG/DL (ref 120–199)
CHOLEST/HDLC SERPL: 7.7 {RATIO} (ref 2–5)
CO2 SERPL-SCNC: 28 MMOL/L (ref 23–29)
CREAT SERPL-MCNC: 1.1 MG/DL (ref 0.5–1.4)
DIFFERENTIAL METHOD: ABNORMAL
EOSINOPHIL # BLD AUTO: 0.5 K/UL (ref 0–0.5)
EOSINOPHIL NFR BLD: 8.1 % (ref 0–8)
ERYTHROCYTE [DISTWIDTH] IN BLOOD BY AUTOMATED COUNT: 12.9 % (ref 11.5–14.5)
EST. GFR  (AFRICAN AMERICAN): >60 ML/MIN/1.73 M^2
EST. GFR  (NON AFRICAN AMERICAN): >60 ML/MIN/1.73 M^2
GLUCOSE SERPL-MCNC: 103 MG/DL (ref 70–110)
HCT VFR BLD AUTO: 45.1 % (ref 40–54)
HDLC SERPL-MCNC: 34 MG/DL (ref 40–75)
HDLC SERPL: 13 % (ref 20–50)
HGB BLD-MCNC: 15.5 G/DL (ref 14–18)
LDLC SERPL CALC-MCNC: ABNORMAL MG/DL (ref 63–159)
LYMPHOCYTES # BLD AUTO: 2.3 K/UL (ref 1–4.8)
LYMPHOCYTES NFR BLD: 38.6 % (ref 18–48)
MCH RBC QN AUTO: 31 PG (ref 27–31)
MCHC RBC AUTO-ENTMCNC: 34.4 G/DL (ref 32–36)
MCV RBC AUTO: 90 FL (ref 82–98)
MONOCYTES # BLD AUTO: 0.7 K/UL (ref 0.3–1)
MONOCYTES NFR BLD: 11.6 % (ref 4–15)
NEUTROPHILS # BLD AUTO: 2.4 K/UL (ref 1.8–7.7)
NEUTROPHILS NFR BLD: 40.4 % (ref 38–73)
NONHDLC SERPL-MCNC: 227 MG/DL
PLATELET # BLD AUTO: 267 K/UL (ref 150–350)
PMV BLD AUTO: 10.1 FL (ref 9.2–12.9)
POTASSIUM SERPL-SCNC: 5.2 MMOL/L (ref 3.5–5.1)
PROT SERPL-MCNC: 7.2 G/DL (ref 6–8.4)
RBC # BLD AUTO: 5 M/UL (ref 4.6–6.2)
SODIUM SERPL-SCNC: 140 MMOL/L (ref 136–145)
TRIGL SERPL-MCNC: 413 MG/DL (ref 30–150)
WBC # BLD AUTO: 6.03 K/UL (ref 3.9–12.7)

## 2019-09-18 PROCEDURE — 85025 COMPLETE CBC W/AUTO DIFF WBC: CPT

## 2019-09-18 PROCEDURE — 80061 LIPID PANEL: CPT

## 2019-09-18 PROCEDURE — 80053 COMPREHEN METABOLIC PANEL: CPT

## 2019-09-18 PROCEDURE — 36415 COLL VENOUS BLD VENIPUNCTURE: CPT

## 2019-09-19 ENCOUNTER — HOSPITAL ENCOUNTER (OUTPATIENT)
Dept: PULMONOLOGY | Facility: CLINIC | Age: 49
Discharge: HOME | End: 2019-09-19
Payer: COMMERCIAL

## 2019-09-19 ENCOUNTER — OFFICE VISIT (OUTPATIENT)
Dept: PODIATRY | Facility: CLINIC | Age: 49
End: 2019-09-19
Payer: COMMERCIAL

## 2019-09-19 VITALS
SYSTOLIC BLOOD PRESSURE: 108 MMHG | HEIGHT: 73 IN | DIASTOLIC BLOOD PRESSURE: 80 MMHG | BODY MASS INDEX: 31.01 KG/M2 | WEIGHT: 234 LBS | HEART RATE: 60 BPM

## 2019-09-19 DIAGNOSIS — R06.89 BREATHING DIFFICULTY: ICD-10-CM

## 2019-09-19 DIAGNOSIS — M79.671 FOOT PAIN, BILATERAL: ICD-10-CM

## 2019-09-19 DIAGNOSIS — M79.672 FOOT PAIN, BILATERAL: ICD-10-CM

## 2019-09-19 DIAGNOSIS — M24.573 EQUINUS CONTRACTURE OF ANKLE: ICD-10-CM

## 2019-09-19 DIAGNOSIS — M72.2 PLANTAR FASCIITIS: Primary | ICD-10-CM

## 2019-09-19 LAB
DLCO ADJ PRE: 37.92 ML/(MIN*MMHG) (ref 27.18–41.03)
DLCO SINGLE BREATH LLN: 27.18
DLCO SINGLE BREATH PRE REF: 113.9 %
DLCO SINGLE BREATH REF: 34.1
DLCOC SBVA LLN: 3.31
DLCOC SBVA PRE REF: 102.9 %
DLCOC SBVA REF: 4.43
DLCOC SINGLE BREATH LLN: 27.18
DLCOC SINGLE BREATH PRE REF: 111.2 %
DLCOC SINGLE BREATH REF: 34.1
DLCOCSBVAULN: 5.55
DLCOCSINGLEBREATHULN: 41.03
DLCOSINGLEBREATHULN: 41.03
DLCOVA LLN: 3.31
DLCOVA PRE REF: 105.4 %
DLCOVA PRE: 4.67 ML/(MIN*MMHG*L) (ref 3.31–5.55)
DLCOVA REF: 4.43
DLCOVAULN: 5.55
DLVAADJ PRE: 4.56 ML/(MIN*MMHG*L) (ref 3.31–5.55)
FEF 25 75 LLN: 2.18
FEF 25 75 PRE REF: 90.7 %
FEF 25 75 REF: 3.96
FEV05 LLN: 2.16
FEV05 REF: 3.3
FEV1 FVC LLN: 68
FEV1 FVC PRE REF: 97 %
FEV1 FVC REF: 79
FEV1 LLN: 3.42
FEV1 PRE REF: 99.7 %
FEV1 REF: 4.36
FVC LLN: 4.36
FVC PRE REF: 102.2 %
FVC REF: 5.56
IVC PRE: 6.1 L (ref 4.36–6.75)
IVC SINGLE BREATH LLN: 4.36
IVC SINGLE BREATH PRE REF: 109.8 %
IVC SINGLE BREATH REF: 5.56
IVCSINGLEBREATHULN: 6.75
PEF LLN: 8.1
PEF PRE REF: 107.8 %
PEF REF: 10.61
PRE DLCO: 38.85 ML/(MIN*MMHG) (ref 27.18–41.03)
PRE FEF 25 75: 3.59 L/S (ref 2.18–5.73)
PRE FET 100: 6.01 SEC
PRE FEV05 REF: 99 %
PRE FEV1 FVC: 76.53 % (ref 68.23–89.6)
PRE FEV1: 4.35 L (ref 3.42–5.3)
PRE FEV5: 3.27 L (ref 2.16–4.44)
PRE FVC: 5.68 L (ref 4.36–6.75)
PRE PEF: 11.43 L/S (ref 8.1–13.12)
VA PRE: 8.32 L (ref 7.55–7.55)
VA SINGLE BREATH LLN: 7.55
VA SINGLE BREATH PRE REF: 110.2 %
VA SINGLE BREATH REF: 7.55
VASINGLEBREATHULN: 7.55

## 2019-09-19 PROCEDURE — 94729 DIFFUSING CAPACITY: CPT | Mod: S$GLB,,, | Performed by: INTERNAL MEDICINE

## 2019-09-19 PROCEDURE — 94010 BREATHING CAPACITY TEST: CPT | Mod: S$GLB,,, | Performed by: INTERNAL MEDICINE

## 2019-09-19 PROCEDURE — 94760 PR NONINVASV OXYGEN SATUR, SINGLE: ICD-10-PCS | Mod: S$GLB,,, | Performed by: INTERNAL MEDICINE

## 2019-09-19 PROCEDURE — 94760 N-INVAS EAR/PLS OXIMETRY 1: CPT | Mod: S$GLB,,, | Performed by: INTERNAL MEDICINE

## 2019-09-19 PROCEDURE — 99999 PR PBB SHADOW E&M-EST. PATIENT-LVL III: CPT | Mod: PBBFAC,,, | Performed by: PODIATRIST

## 2019-09-19 PROCEDURE — 3008F PR BODY MASS INDEX (BMI) DOCUMENTED: ICD-10-PCS | Mod: CPTII,S$GLB,, | Performed by: PODIATRIST

## 2019-09-19 PROCEDURE — 99203 OFFICE O/P NEW LOW 30 MIN: CPT | Mod: S$GLB,,, | Performed by: PODIATRIST

## 2019-09-19 PROCEDURE — 3008F BODY MASS INDEX DOCD: CPT | Mod: CPTII,S$GLB,, | Performed by: PODIATRIST

## 2019-09-19 PROCEDURE — 94729 PR C02/MEMBANE DIFFUSE CAPACITY: ICD-10-PCS | Mod: S$GLB,,, | Performed by: INTERNAL MEDICINE

## 2019-09-19 PROCEDURE — 99203 PR OFFICE/OUTPT VISIT, NEW, LEVL III, 30-44 MIN: ICD-10-PCS | Mod: S$GLB,,, | Performed by: PODIATRIST

## 2019-09-19 PROCEDURE — 94010 BREATHING CAPACITY TEST: ICD-10-PCS | Mod: S$GLB,,, | Performed by: INTERNAL MEDICINE

## 2019-09-19 PROCEDURE — 99999 PR PBB SHADOW E&M-EST. PATIENT-LVL III: ICD-10-PCS | Mod: PBBFAC,,, | Performed by: PODIATRIST

## 2019-09-19 RX ORDER — MELOXICAM 15 MG/1
15 TABLET ORAL DAILY
Qty: 30 TABLET | Refills: 0 | Status: SHIPPED | OUTPATIENT
Start: 2019-09-19 | End: 2021-03-09

## 2019-09-19 NOTE — LETTER
September 19, 2019      Adriano Shelby MD  [address]  [facility] [address]           [facility] - Podiatry  [address]  [facility] [address]  Phone: [phone]          Patient: Erich Chery   MR Number: [id number]   YOB: 1970   Date of Visit: 9/19/2019       Dear Dr. Adriano Shelby:    Thank you for referring Erich Chery to me for evaluation. Attached you will find relevant portions of my assessment and plan of care.    If you have questions, please do not hesitate to call me. I look forward to following Erich Chery along with you.    Sincerely,    Nicolas Castanon, NILESH    Enclosure  CC:  No Recipients    If you would like to receive this communication electronically, please contact [email or web address] or [phone] to request more information on [email or web address] Link access.    For providers and/or their staff who would like to refer a patient to [facility], please contact us through our one-stop-shop provider referral line, [facility], at [phone].    If you feel you have received this communication in error or would no longer like to receive these types of communications, please e-mail [email or web address]

## 2019-09-19 NOTE — PROGRESS NOTES
Subjective:      Patient ID: Erich Chery is a 48 y.o. male.    Chief Complaint: Foot Pain (b/l)    Sharp deep pain bottom both heels.  Gradual onset, worsening over past several weeks, aggravated by increased weight bearing, shoe gear, pressure.  No previous medical treatment.  OTC pain med not helping. Denies trauma, surgery.    Review of Systems   Constitution: Negative for chills, diaphoresis, fever, malaise/fatigue and night sweats.   Cardiovascular: Negative for claudication, cyanosis, leg swelling and syncope.   Skin: Negative for color change, dry skin, nail changes, rash, suspicious lesions and unusual hair distribution.   Musculoskeletal: Negative for falls, joint pain, joint swelling, muscle cramps, muscle weakness and stiffness.   Gastrointestinal: Negative for constipation, diarrhea, nausea and vomiting.   Neurological: Negative for brief paralysis, disturbances in coordination, focal weakness, numbness, paresthesias, sensory change and tremors.           Objective:      Physical Exam   Constitutional: He is oriented to person, place, and time. He appears well-developed and well-nourished. He is cooperative. No distress.   Cardiovascular:   Pulses:       Popliteal pulses are 2+ on the right side, and 2+ on the left side.        Dorsalis pedis pulses are 2+ on the right side, and 2+ on the left side.        Posterior tibial pulses are 2+ on the right side, and 2+ on the left side.   Capillary refill 3 seconds all toes/distal feet, all toes/both feet warm to touch.      Negative lymphadenopathy bilateral popliteal fossa and tarsal tunnel.      Negavie lower extremity edema bilateral.     Musculoskeletal:        Right ankle: He exhibits normal range of motion, no swelling, no ecchymosis, no deformity, no laceration and normal pulse. Achilles tendon normal. Achilles tendon exhibits no pain, no defect and normal Almonte's test results.   Sharp deep pain to palpation inferior heel right and left at  medial calcaneal tubercle without ecchymosis, erythema, edema, or cardinal signs infection, and no signs of trauma.    Ankle dorsiflexion decreased at <10 degrees bilateral with moderate increase with knee flexion bilateral.    Otherwise, Normal angle, base, station of gait. All ten toes without clubbing, cyanosis, or signs of ischemia.  No pain to palpation bilateral lower extremities.  Range of motion, stability, muscle strength, and muscle tone normal bilateral feet and legs.    Lymphadenopathy: No inguinal adenopathy noted on the right or left side.   Negative lymphadenopathy bilateral popliteal fossa and tarsal tunnel.    Negative lymphangitic streaking bilateral feet/ankles/legs.   Neurological: He is alert and oriented to person, place, and time. He has normal strength. He displays no atrophy and no tremor. No sensory deficit. He exhibits normal muscle tone. Gait normal.   Reflex Scores:       Patellar reflexes are 2+ on the right side and 2+ on the left side.       Achilles reflexes are 2+ on the right side and 2+ on the left side.  Negative tinel sign to percussion sural, superficial peroneal, deep peroneal, saphenous, and posterior tibial nerves right and left ankles and feet.     Skin: Skin is warm, dry and intact. Capillary refill takes 2 to 3 seconds. No abrasion, no bruising, no burn, no ecchymosis, no laceration, no lesion and no rash noted. He is not diaphoretic. No cyanosis or erythema. No pallor. Nails show no clubbing.   Skin is normal age and health appropriate color, turgor, texture, and temperature bilateral lower extremities without ulceration, hyperpigmentation, discoloration, masses nodules or cords palpated.  No ecchymosis, erythema, edema, or cardinal signs of infection bilateral lower extremities.       Psychiatric: He has a normal mood and affect.             Assessment:       Encounter Diagnoses   Name Primary?    Plantar fasciitis Yes    Equinus contracture of ankle     Foot pain,  bilateral          Plan:       Erich was seen today for foot pain.    Diagnoses and all orders for this visit:    Plantar fasciitis    Equinus contracture of ankle    Foot pain, bilateral    Other orders  -     meloxicam (MOBIC) 15 MG tablet; Take 1 tablet (15 mg total) by mouth once daily.      I counseled the patient on his conditions, their implications and medical management.        Patient will stretch the tendo achilles complex three times daily as demonstrated in the office.  Literature was dispensed illustrating proper stretching technique.    Patient will obtain over the counter arch supports and wear them in shoes whenever possible.  Athletic shoes intended for walking or running are usually best.    The patient was advised that NSAID-type medications have two very important potential side effects: gastrointestinal irritation including hemorrhage and renal injuries. He was asked to take the medication with food and to stop if he experiences any GI upset. I asked him to call for vomiting, abdominal pain or black/bloody stools. The patient expresses understanding of these issues and questions were answered.    Discussed conservative treatment with shoes of adequate dimensions, material, and style to alleviate symptoms and delay or prevent surgical intervention.    Rx meloxicam          Follow up in about 1 month (around 10/19/2019), or if symptoms worsen or fail to improve.

## 2019-09-23 ENCOUNTER — TELEPHONE (OUTPATIENT)
Dept: INTERNAL MEDICINE | Facility: CLINIC | Age: 49
End: 2019-09-23

## 2019-09-23 DIAGNOSIS — E78.2 MIXED HYPERLIPIDEMIA: Primary | ICD-10-CM

## 2019-09-23 DIAGNOSIS — E87.5 HYPERKALEMIA: ICD-10-CM

## 2019-09-23 RX ORDER — ATORVASTATIN CALCIUM 40 MG/1
40 TABLET, FILM COATED ORAL DAILY
Qty: 90 TABLET | Refills: 3 | Status: SHIPPED | OUTPATIENT
Start: 2019-09-23 | End: 2020-01-20 | Stop reason: SDUPTHER

## 2019-09-23 NOTE — TELEPHONE ENCOUNTER
BMP 9/25/19.  Lipid panel 6 months.  Please process/notify patient.  Explanation sent to patient via portal.

## 2019-09-25 ENCOUNTER — PATIENT OUTREACH (OUTPATIENT)
Dept: ADMINISTRATIVE | Facility: OTHER | Age: 49
End: 2019-09-25

## 2019-09-25 ENCOUNTER — LAB VISIT (OUTPATIENT)
Dept: LAB | Facility: HOSPITAL | Age: 49
End: 2019-09-25
Attending: FAMILY MEDICINE
Payer: COMMERCIAL

## 2019-09-25 DIAGNOSIS — E87.5 HYPERKALEMIA: ICD-10-CM

## 2019-09-25 LAB
ANION GAP SERPL CALC-SCNC: 9 MMOL/L (ref 8–16)
BUN SERPL-MCNC: 16 MG/DL (ref 6–20)
CALCIUM SERPL-MCNC: 9.2 MG/DL (ref 8.7–10.5)
CHLORIDE SERPL-SCNC: 104 MMOL/L (ref 95–110)
CO2 SERPL-SCNC: 27 MMOL/L (ref 23–29)
CREAT SERPL-MCNC: 1.3 MG/DL (ref 0.5–1.4)
EST. GFR  (AFRICAN AMERICAN): >60 ML/MIN/1.73 M^2
EST. GFR  (NON AFRICAN AMERICAN): >60 ML/MIN/1.73 M^2
GLUCOSE SERPL-MCNC: 103 MG/DL (ref 70–110)
POTASSIUM SERPL-SCNC: 4.5 MMOL/L (ref 3.5–5.1)
SODIUM SERPL-SCNC: 140 MMOL/L (ref 136–145)

## 2019-09-25 PROCEDURE — 80048 BASIC METABOLIC PNL TOTAL CA: CPT

## 2019-09-25 PROCEDURE — 36415 COLL VENOUS BLD VENIPUNCTURE: CPT

## 2019-09-27 ENCOUNTER — OFFICE VISIT (OUTPATIENT)
Dept: PULMONOLOGY | Facility: CLINIC | Age: 49
End: 2019-09-27
Payer: COMMERCIAL

## 2019-09-27 ENCOUNTER — TELEPHONE (OUTPATIENT)
Dept: SLEEP MEDICINE | Facility: HOSPITAL | Age: 49
End: 2019-09-27

## 2019-09-27 VITALS
BODY MASS INDEX: 31.13 KG/M2 | HEART RATE: 55 BPM | HEIGHT: 73 IN | WEIGHT: 234.88 LBS | SYSTOLIC BLOOD PRESSURE: 126 MMHG | DIASTOLIC BLOOD PRESSURE: 82 MMHG | OXYGEN SATURATION: 95 %

## 2019-09-27 DIAGNOSIS — G47.01 INSOMNIA DUE TO MEDICAL CONDITION: ICD-10-CM

## 2019-09-27 DIAGNOSIS — G47.33 OSA (OBSTRUCTIVE SLEEP APNEA): Primary | ICD-10-CM

## 2019-09-27 PROCEDURE — 99999 PR PBB SHADOW E&M-EST. PATIENT-LVL III: CPT | Mod: PBBFAC,,, | Performed by: INTERNAL MEDICINE

## 2019-09-27 PROCEDURE — 99999 PR PBB SHADOW E&M-EST. PATIENT-LVL III: ICD-10-PCS | Mod: PBBFAC,,, | Performed by: INTERNAL MEDICINE

## 2019-09-27 PROCEDURE — 99244 OFF/OP CNSLTJ NEW/EST MOD 40: CPT | Mod: S$GLB,,, | Performed by: INTERNAL MEDICINE

## 2019-09-27 PROCEDURE — 99244 PR OFFICE CONSULTATION,LEVEL IV: ICD-10-PCS | Mod: S$GLB,,, | Performed by: INTERNAL MEDICINE

## 2019-09-27 NOTE — PATIENT INSTRUCTIONS
Your provider has scheduled you a Sleep Study    What you need to know:     This referral will be sent to your insurance for authorization.  Depending on your insurance company, this process may take two weeks to be authorized.     Once your study has been authorized, Yan[facility] or Quin will contact you to schedule your sleep study.   o Their number is [phone]. The [facility] is located on 2nd floor of [facility].     We will call you when the sleep study results are ready - if you have not heard from us by 2 weeks from the date of the study, please call [phone].     For information regarding financial obligations, please call [employer] at [phone].    If you study is already scheduled, please call [phone].     Once your study has been completed, it will take up to 14 business days for the results to be sent back to your provider.    If you have any questions you can send a message through your [facility] account, or call the clinic at [phone].    You are advised to abstain from driving should you feel sleepy or drowsy.

## 2019-09-27 NOTE — TELEPHONE ENCOUNTER
Spoke with patient and scheduled HST. Patient was given contact information and advised to call [employer] prior to appointment for financial clearance. Patient instructed to register at patient registration for sleep study. Patient voiced understanding. Appointment letter handed in office.

## 2019-09-27 NOTE — ASSESSMENT & PLAN NOTE
The patient symptomatically has loud snoring, restless sleep with findings of enlarged neck, high grade mallampatti. This warrants further investigation for possible obstructive sleep apnea.  Patient will be contacted after sleep study is done.

## 2019-09-27 NOTE — LETTER
September 27, 2019      Adriano Shelby MD  [address]  [facility] [address]           [facility]  [address]   [facility] [place] [address]  Phone: [phone]  Fax: [phone]          Patient: Erich Chery   MR Number: [id number]   YOB: 1970   Date of Visit: 9/27/2019       Dear Dr. Adriano Shelby:    Thank you for referring Erich Chery to me for evaluation. Attached you will find relevant portions of my assessment and plan of care.    If you have questions, please do not hesitate to call me. I look forward to following Erich Chery along with you.    Sincerely,    Ernst Guzmán MD    Enclosure  CC:  No Recipients    If you would like to receive this communication electronically, please contact [email or web address] or [phone] to request more information on [employer] Link access.    For providers and/or their staff who would like to refer a patient to [facility], please contact us through our one-stop-shop provider referral line, [facility], at [phone].    If you feel you have received this communication in error or would no longer like to receive these types of communications, please e-mail [email or web address]

## 2019-09-30 ENCOUNTER — HOSPITAL ENCOUNTER (OUTPATIENT)
Dept: SLEEP MEDICINE | Facility: HOSPITAL | Age: 49
Discharge: HOME | End: 2019-09-30
Attending: INTERNAL MEDICINE
Payer: COMMERCIAL

## 2019-09-30 DIAGNOSIS — G47.33 OSA (OBSTRUCTIVE SLEEP APNEA): ICD-10-CM

## 2019-09-30 PROCEDURE — 95800 SLP STDY UNATTENDED: CPT

## 2019-09-30 NOTE — PROGRESS NOTES
The patient ID was verified. He  instructed on how to turn the Home Sleep Testing device on and off, how to apply the sensors. He  was encouraged to sleep on supine position and must have 6 hours of sleep. The patient was instructed not to get the device wet and return it to a  at the hospital. All questions were answered prior to patient leaving. He was provided the  after visit summary.

## 2019-10-07 PROCEDURE — 95800 PR SLEEP STUDY, UNATTENDED, RECORD HEART RATE/O2 SAT/RESP ANAL/SLEEP TIME: ICD-10-PCS | Mod: 26,,, | Performed by: INTERNAL MEDICINE

## 2019-10-07 PROCEDURE — 95800 SLP STDY UNATTENDED: CPT | Mod: 26,,, | Performed by: INTERNAL MEDICINE

## 2019-10-08 ENCOUNTER — TELEPHONE (OUTPATIENT)
Dept: PULMONOLOGY | Facility: HOSPITAL | Age: 49
End: 2019-10-08

## 2019-10-08 ENCOUNTER — PATIENT MESSAGE (OUTPATIENT)
Dept: PULMONOLOGY | Facility: CLINIC | Age: 49
End: 2019-10-08

## 2019-10-08 NOTE — PROCEDURES
"Dear Provider,     You have ordered sleep LAB services to perform the sleep study for Erich Chery.  The sleep study that you ordered is complete.      Please find Sleep Study result in "Chart Review" under the "Media tab."      As the ordering provider, you are responsible for reviewing the results and implementing a treatment plan with your patient.    If you need a Sleep Medicine provider to explain the sleep study findings and arrange treatment for the patient, please refer patient for consultation to our Sleep Clinic via [facility] with Ambulatory Consult Sleep.    To do that please place an order for an  "Ambulatory Consult Sleep" - it will go to our clinic work queue for our Medical Assistant to contact the patient for an appointment.     For any questions, please contact our clinic staff at [phone] to talk to clinical staff.   "

## 2019-10-08 NOTE — TELEPHONE ENCOUNTER
----- Message from Ernst Guzmán MD sent at 10/7/2019 11:04 PM CDT -----  Please schedule sleep clinic appointmetnt with md/np in 1-2 weeks to discuss sleep study result.

## 2019-10-15 ENCOUNTER — PATIENT OUTREACH (OUTPATIENT)
Dept: ADMINISTRATIVE | Facility: OTHER | Age: 49
End: 2019-10-15

## 2019-10-17 ENCOUNTER — OFFICE VISIT (OUTPATIENT)
Dept: OTOLARYNGOLOGY | Facility: CLINIC | Age: 49
End: 2019-10-17
Payer: COMMERCIAL

## 2019-10-17 VITALS
HEIGHT: 73 IN | WEIGHT: 233 LBS | BODY MASS INDEX: 30.88 KG/M2 | DIASTOLIC BLOOD PRESSURE: 70 MMHG | SYSTOLIC BLOOD PRESSURE: 106 MMHG | HEART RATE: 57 BPM

## 2019-10-17 DIAGNOSIS — J34.3 NASAL TURBINATE HYPERTROPHY: ICD-10-CM

## 2019-10-17 DIAGNOSIS — J33.9 NASAL POLYPOSIS: ICD-10-CM

## 2019-10-17 DIAGNOSIS — J34.2 DEVIATED NASAL SEPTUM: ICD-10-CM

## 2019-10-17 DIAGNOSIS — J32.8 OTHER CHRONIC SINUSITIS: ICD-10-CM

## 2019-10-17 DIAGNOSIS — J31.0 CHRONIC RHINITIS: ICD-10-CM

## 2019-10-17 DIAGNOSIS — G47.33 OSA (OBSTRUCTIVE SLEEP APNEA): Primary | ICD-10-CM

## 2019-10-17 PROCEDURE — 99205 PR OFFICE/OUTPT VISIT, NEW, LEVL V, 60-74 MIN: ICD-10-PCS | Mod: 25,S$GLB,, | Performed by: OTOLARYNGOLOGY

## 2019-10-17 PROCEDURE — 99999 PR PBB SHADOW E&M-EST. PATIENT-LVL III: ICD-10-PCS | Mod: PBBFAC,,, | Performed by: OTOLARYNGOLOGY

## 2019-10-17 PROCEDURE — 31231 NASAL/SINUS ENDOSCOPY: ICD-10-PCS | Mod: S$GLB,,, | Performed by: OTOLARYNGOLOGY

## 2019-10-17 PROCEDURE — 3008F BODY MASS INDEX DOCD: CPT | Mod: CPTII,S$GLB,, | Performed by: OTOLARYNGOLOGY

## 2019-10-17 PROCEDURE — 31231 NASAL ENDOSCOPY DX: CPT | Mod: S$GLB,,, | Performed by: OTOLARYNGOLOGY

## 2019-10-17 PROCEDURE — 99205 OFFICE O/P NEW HI 60 MIN: CPT | Mod: 25,S$GLB,, | Performed by: OTOLARYNGOLOGY

## 2019-10-17 PROCEDURE — 3008F PR BODY MASS INDEX (BMI) DOCUMENTED: ICD-10-PCS | Mod: CPTII,S$GLB,, | Performed by: OTOLARYNGOLOGY

## 2019-10-17 PROCEDURE — 99999 PR PBB SHADOW E&M-EST. PATIENT-LVL III: CPT | Mod: PBBFAC,,, | Performed by: OTOLARYNGOLOGY

## 2019-10-17 RX ORDER — PREDNISONE 10 MG/1
TABLET ORAL
Qty: 20 TABLET | Refills: 0 | Status: SHIPPED | OUTPATIENT
Start: 2019-10-17 | End: 2021-03-09

## 2019-10-17 NOTE — PROGRESS NOTES
Subjective:      Erich Chery is a 48 y.o. male who was self-referred for nasal obstruction.    He relates a long history of congested nasal breathing, which is bilateral and perennial, and associated with snoring and sleep apnea.  He had a sleep study that was diagnostic of sleep apnea, though he is not interested in CPAP therapy.  He notes the left side is somewhat worse, and notes associated occasional facial pressure, which resolves after sleep.  He notes pruritic symptoms and postnasal drip, and denies discharge, aural fullness or notable sinus infections.  He previously used flonase for several months but found this did not help much.  He previously was considering septoplasty, but decided against it out of concern for loss of smell, which would affect his work as a .    Current sinonasal medications as above.  The last course of antibiotics was about 2 years ago.  He does not regularly use nasal decongestant sprays.    He recalls previously having allergy testing as a child    He denies a history of asthma though he did have shortness of breath earlier this year and was given albuterol, which he has rarely used.  He had spirometry last month that was normal.    He denies a history of reflux symptoms.  He has not previously had an EGD.    He denies a diagnosis of obstructive sleep apnea.     He has not had sinonasal surgery.    He does not recall a prior history of nasal trauma.    SNOT-22 score = 44, NOSE score = 70%, ETDQ-7 score = 1.0    Global QOL = 75%      Past Medical History  He has a past medical history of Asthma, Deviated septum, Genital herpes in men, Hyperlipidemia, Obesity, and Tobacco use.    Past Surgical History  He has a past surgical history that includes Knee surgery.    Family History  His family history includes Heart disease in his father; Hyperlipidemia in his father.    Social History  He reports that he quit smoking about 3 months ago. His smoking use included cigarettes.  "He has a 3.00 pack-year smoking history. He has never used smokeless tobacco. He reports that he drinks alcohol. He reports that he has current or past drug history. Drug: Marijuana.    Allergies  He has No Known Allergies.    Medications   He has a current medication list which includes the following prescription(s): albuterol, atorvastatin, fluticasone propionate, meloxicam, prednisone, and valacyclovir.    Review of Systems  Review of Systems   Constitutional: Positive for fatigue. Negative for fever and unexpected weight change.   HENT: Positive for congestion. Negative for dental problem, ear discharge, ear pain, facial swelling, hearing loss, hoarse voice, nosebleeds, postnasal drip, rhinorrhea, sinus pressure, sore throat, tinnitus, trouble swallowing and voice change.    Eyes: Negative for photophobia, discharge, itching and visual disturbance.   Respiratory: Positive for apnea and wheezing. Negative for cough and shortness of breath.    Cardiovascular: Negative for chest pain and palpitations.   Gastrointestinal: Negative for abdominal pain, nausea and vomiting.   Endocrine: Negative for cold intolerance and heat intolerance.   Genitourinary: Negative for difficulty urinating.   Musculoskeletal: Negative for arthralgias, back pain, myalgias and neck pain.   Skin: Negative for rash.   Allergic/Immunologic: Positive for environmental allergies. Negative for food allergies.   Neurological: Negative for dizziness, seizures, syncope, weakness and headaches.   Hematological: Negative for adenopathy. Does not bruise/bleed easily.   Psychiatric/Behavioral: Positive for sleep disturbance. Negative for decreased concentration and dysphoric mood. The patient is not nervous/anxious.    All other systems reviewed and are negative.         Objective:     /70   Pulse (!) 57   Ht 6' 1" (1.854 m)   Wt 105.7 kg (233 lb)   BMI 30.74 kg/m²        Constitutional:   He appears well-developed. He is cooperative. Normal " speech.  No hoarse voice.      Head:  Normocephalic. Salivary glands normal.  Facial strength is normal.      Ears:    Right Ear: No drainage or tenderness. Tympanic membrane is not perforated. Tympanic membrane mobility is normal. No middle ear effusion. No decreased hearing is noted.   Left Ear: No drainage or tenderness. Tympanic membrane is not perforated. Tympanic membrane mobility is normal.  No middle ear effusion. No decreased hearing is noted.     Nose:  Septal deviation present. No mucosal edema, rhinorrhea or polyps. No epistaxis. Turbinate hypertrophy.  Turbinates normal and no turbinate masses.  Right sinus exhibits no maxillary sinus tenderness and no frontal sinus tenderness. Left sinus exhibits no maxillary sinus tenderness and no frontal sinus tenderness.     Mouth/Throat  Oropharynx clear and moist without lesions or asymmetry and normal uvula midline. He does not have dentures. Normal dentition. No oral lesions or mucous membrane lesions. No oropharyngeal exudate or posterior oropharyngeal erythema. Mirror exam not performed due to patient tolerance.  Mirror exam not performed due to patient tolerance.      Neck:  Neck normal without thyromegaly masses, asymmetry, normal tracheal structure, crepitus, and tenderness, thyroid normal, trachea normal and no adenopathy. Normal range of motion present.     He has no cervical adenopathy.     Cardiovascular:   Regular rhythm.      Pulmonary/Chest:   Effort normal.     Psychiatric:   He has a normal mood and affect. His speech is normal and behavior is normal.     Neurological:   No cranial nerve deficit.     Skin:   No rash noted.       Procedure    Nasal endoscopy performed.  See procedure note.     Left nasal valve     Left MT     Right nasal valve      Right polyp     Right polyp      Data Reviewed    WBC (K/uL)   Date Value   09/18/2019 6.03     Eosinophil% (%)   Date Value   09/18/2019 8.1 (H)     Eos # (K/uL)   Date Value   09/18/2019 0.5      Platelets (K/uL)   Date Value   09/18/2019 267     Glucose (mg/dL)   Date Value   09/25/2019 103     No results found for: IGE    No sinus imaging available.       Assessment:     1. ARTUR (obstructive sleep apnea)    2. Chronic rhinitis    3. Nasal polyposis    4. Other chronic sinusitis    5. Deviated nasal septum    6. Nasal turbinate hypertrophy         Plan:     I had a long discussion with the patient regarding his condition and the further workup and management options.  He would benefit from endoscopic sinus surgery and septoplasty for the treatment of his condition.  We discussed the likelihood that this would improve his condition, though not likely cure sleep apnea.  This would include sinuses to be determined by imaging and would be possibly bilateral, and definitely right-sided.  Inferior turbinate reduction would be included.  I discussed the risks, benefits and alternatives to surgery with the patient, as well as the expected postoperative course.  I gave him the opportunity to ask questions and I answered all of them.  I provided relevant printed information on his condition for him to review at home.  Postoperative observation may be necessary due to sleep apnea.  He may have anesthesia triage by telephone. He will also need a CT sinuses with Stealth protocol prior to surgery.  He will call when he decides to schedule surgery.  He will need to return for a postoperative visit 1 week after surgery.  For now, I prescribed a low dose of prednisone with a taper after counseling the patent on the potential risks and benefits.  He is also interested in the option of oral appliance therapy, so I am referring him to Dr. Bill Whitley for that evaluation.    Follow up for surgery.

## 2019-10-17 NOTE — PROCEDURES
Nasal/sinus endoscopy  Date/Time: 10/17/2019 4:00 PM  Performed by: Isaac Calderón MD  Authorized by: Isaac Calderón MD     Consent Done?:  Yes (Verbal)  Anesthesia:     Local anesthetic:  Lidocaine 4% and Kevin-Synephrine 1/2%    Patient tolerance:  Patient tolerated the procedure well with no immediate complications  Nose:     Procedure Performed:  Nasal Endoscopy  External:      No external nasal deformity  Intranasal:      Mucosa polyps     Mucosa ulcers not present     No mucosa lesions present     Enlarged turbinates     Septum gross deformity  Nasopharynx:      No mucosa lesions     Adenoids not present     Posterior choanae patent     Eustachian tube patent     Prominent right-sided grade 3 nasal polyp.  Nonpurulent exudate.

## 2019-10-21 ENCOUNTER — PATIENT MESSAGE (OUTPATIENT)
Dept: OTOLARYNGOLOGY | Facility: CLINIC | Age: 49
End: 2019-10-21

## 2019-10-22 ENCOUNTER — TELEPHONE (OUTPATIENT)
Dept: OTOLARYNGOLOGY | Facility: CLINIC | Age: 49
End: 2019-10-22

## 2019-10-22 DIAGNOSIS — J32.8 OTHER CHRONIC SINUSITIS: Primary | ICD-10-CM

## 2019-10-22 NOTE — TELEPHONE ENCOUNTER
----- Message from Jasmin Freedman MA sent at 10/22/2019  1:48 PM CDT -----  Contact: sELF  Pt needs an Order put in for his CT Scan  ----- Message -----  From: Joe Love, Patient Care Assistant  Sent: 10/22/2019   1:22 PM CDT  To: Kaitlynn Gray Staff    Pt is calling to check on the status of a CT Scan.    Please advise, pt can be reached at [phone].

## 2019-10-24 ENCOUNTER — HOSPITAL ENCOUNTER (OUTPATIENT)
Dept: RADIOLOGY | Facility: HOSPITAL | Age: 49
Discharge: HOME | End: 2019-10-24
Attending: OTOLARYNGOLOGY
Payer: COMMERCIAL

## 2019-10-24 DIAGNOSIS — J32.8 OTHER CHRONIC SINUSITIS: ICD-10-CM

## 2019-10-24 PROCEDURE — 70486 CT MAXILLOFACIAL W/O DYE: CPT | Mod: 26,,, | Performed by: RADIOLOGY

## 2019-10-24 PROCEDURE — 70486 CT MAXILLOFACIAL W/O DYE: CPT | Mod: TC

## 2019-10-24 PROCEDURE — 70486 CT MEDTRONIC SINUSES WITHOUT: ICD-10-PCS | Mod: 26,,, | Performed by: RADIOLOGY

## 2019-10-25 ENCOUNTER — PATIENT MESSAGE (OUTPATIENT)
Dept: OTOLARYNGOLOGY | Facility: CLINIC | Age: 49
End: 2019-10-25

## 2019-10-29 ENCOUNTER — PATIENT MESSAGE (OUTPATIENT)
Dept: OTOLARYNGOLOGY | Facility: CLINIC | Age: 49
End: 2019-10-29

## 2019-11-01 ENCOUNTER — PATIENT MESSAGE (OUTPATIENT)
Dept: OTOLARYNGOLOGY | Facility: CLINIC | Age: 49
End: 2019-11-01

## 2019-11-01 NOTE — TELEPHONE ENCOUNTER
I spoke with him, answered his questions.  He wants to proceed with scheduling.  Please contact him to arrange a date.

## 2019-11-04 ENCOUNTER — TELEPHONE (OUTPATIENT)
Dept: OTOLARYNGOLOGY | Facility: CLINIC | Age: 49
End: 2019-11-04

## 2019-12-04 ENCOUNTER — TELEPHONE (OUTPATIENT)
Dept: OTOLARYNGOLOGY | Facility: CLINIC | Age: 49
End: 2019-12-04

## 2019-12-04 DIAGNOSIS — J31.0 CHRONIC RHINITIS: ICD-10-CM

## 2019-12-04 DIAGNOSIS — J33.9 NASAL POLYPOSIS: ICD-10-CM

## 2019-12-04 DIAGNOSIS — J34.3 NASAL TURBINATE HYPERTROPHY: ICD-10-CM

## 2019-12-04 DIAGNOSIS — J34.2 DEVIATED NASAL SEPTUM: ICD-10-CM

## 2019-12-04 DIAGNOSIS — G47.33 OSA (OBSTRUCTIVE SLEEP APNEA): Primary | ICD-10-CM

## 2019-12-04 DIAGNOSIS — J32.8 OTHER CHRONIC SINUSITIS: ICD-10-CM

## 2020-01-02 ENCOUNTER — PATIENT MESSAGE (OUTPATIENT)
Dept: OTOLARYNGOLOGY | Facility: CLINIC | Age: 50
End: 2020-01-02

## 2020-01-02 ENCOUNTER — TELEPHONE (OUTPATIENT)
Dept: OTOLARYNGOLOGY | Facility: CLINIC | Age: 50
End: 2020-01-02

## 2020-01-03 ENCOUNTER — TELEPHONE (OUTPATIENT)
Dept: OTOLARYNGOLOGY | Facility: CLINIC | Age: 50
End: 2020-01-03

## 2020-01-20 DIAGNOSIS — A60.00 GENITAL HERPES SIMPLEX, UNSPECIFIED SITE: ICD-10-CM

## 2020-01-20 DIAGNOSIS — E78.2 MIXED HYPERLIPIDEMIA: ICD-10-CM

## 2020-01-20 RX ORDER — ATORVASTATIN CALCIUM 40 MG/1
40 TABLET, FILM COATED ORAL DAILY
Qty: 90 TABLET | Refills: 3 | Status: SHIPPED | OUTPATIENT
Start: 2020-01-20 | End: 2021-03-09

## 2020-01-20 RX ORDER — VALACYCLOVIR HYDROCHLORIDE 1 G/1
1000 TABLET, FILM COATED ORAL DAILY
Qty: 5 TABLET | Refills: 6 | Status: SHIPPED | OUTPATIENT
Start: 2020-01-20 | End: 2020-05-12 | Stop reason: SDUPTHER

## 2020-05-11 ENCOUNTER — TELEPHONE (OUTPATIENT)
Dept: INTERNAL MEDICINE | Facility: CLINIC | Age: 50
End: 2020-05-11

## 2020-05-11 DIAGNOSIS — R53.83 FATIGUE, UNSPECIFIED TYPE: Primary | ICD-10-CM

## 2020-05-11 DIAGNOSIS — R50.9 ELEVATED TEMPERATURE: ICD-10-CM

## 2020-05-11 NOTE — TELEPHONE ENCOUNTER
Returned call and clarified difference between nasal swab and blood test.  Patient wants drive through testing for active infection.  States he had an elevated temp to 99.7 recently and felt run down but both have improved.    Staff:  Ordered drive through COVID-19 testing; please process/notify patient.

## 2020-05-11 NOTE — TELEPHONE ENCOUNTER
"----- Message from Dolores Dumas sent at 5/11/2020  2:25 PM CDT -----  Contact: [employer] Patient Portal Request  Time Sensitive    Name of Caller: Patient Portal Erich Chery   Reason for Visit/Symptoms: COVID testing  Best Contact Number or Confirm if [employer] Preferred: [employer]  Preferred Date/Time of Appointment: Anytime 5/11/2020 - 5/12/2020   Interested in Virtual Visit (yes/no): unknown  Additional Information: "covid test .  i just want the blood test if poss.  have very inflamed nasal polyps -worried about the swab.  is that possible?"    "

## 2020-05-11 NOTE — TELEPHONE ENCOUNTER
"----- Message from Maria L Vila MA sent at 5/11/2020  2:53 PM CDT -----  Contact: [employer] Patient Portal Request  Please, advise. Thanks.   ----- Message -----  From: Dolores Dumas  Sent: 5/11/2020   2:25 PM CDT  To: Afsaneh Oh Staff    Time Sensitive    Name of Caller: Patient Portal Erich Chery   Reason for Visit/Symptoms: COVID testing  Best Contact Number or Confirm if [employer] Preferred: [facility]  Preferred Date/Time of Appointment: Anytime 5/11/2020 - 5/12/2020   Interested in Virtual Visit (yes/no): unknown  Additional Information: "covid test .  i just want the blood test if poss.  have very inflamed nasal polyps -worried about the swab.  is that possible?"      "

## 2020-05-11 NOTE — TELEPHONE ENCOUNTER
Spoke with pt explained when testing Covid for first time pt must be swab. Pt understood. Please, advise. Thanks.

## 2020-05-12 ENCOUNTER — LAB VISIT (OUTPATIENT)
Dept: INTERNAL MEDICINE | Facility: CLINIC | Age: 50
End: 2020-05-12
Payer: COMMERCIAL

## 2020-05-12 DIAGNOSIS — R53.83 FATIGUE, UNSPECIFIED TYPE: ICD-10-CM

## 2020-05-12 DIAGNOSIS — R50.9 ELEVATED TEMPERATURE: ICD-10-CM

## 2020-05-12 DIAGNOSIS — A60.00 GENITAL HERPES SIMPLEX, UNSPECIFIED SITE: ICD-10-CM

## 2020-05-12 PROCEDURE — U0002 COVID-19 LAB TEST NON-CDC: HCPCS

## 2020-05-12 RX ORDER — VALACYCLOVIR HYDROCHLORIDE 1 G/1
1000 TABLET, FILM COATED ORAL DAILY
Qty: 5 TABLET | Refills: 6 | Status: SHIPPED | OUTPATIENT
Start: 2020-05-12 | End: 2021-05-31

## 2020-05-13 LAB — SARS-COV-2 RNA RESP QL NAA+PROBE: NOT DETECTED

## 2020-07-08 ENCOUNTER — TELEPHONE (OUTPATIENT)
Dept: INTERNAL MEDICINE | Facility: CLINIC | Age: 50
End: 2020-07-08

## 2020-07-08 DIAGNOSIS — Z20.822 EXPOSURE TO COVID-19 VIRUS: ICD-10-CM

## 2020-07-08 DIAGNOSIS — E78.2 MIXED HYPERLIPIDEMIA: ICD-10-CM

## 2020-07-08 DIAGNOSIS — Z12.5 SCREENING PSA (PROSTATE SPECIFIC ANTIGEN): ICD-10-CM

## 2020-07-08 DIAGNOSIS — Z00.00 ANNUAL PHYSICAL EXAM: Primary | ICD-10-CM

## 2020-07-08 NOTE — TELEPHONE ENCOUNTER
----- Message from Anahy Burroughs sent at 7/8/2020  7:14 AM CDT -----  Appointment Request From: Erich Chery    With Provider: Adriano Shelby MD [[facility]]    Preferred Date Range: Any    Preferred Times: Any Time    Reason for visit: Blood test for cholesterol , PSA ,and covid antibody test    Comments:  Blood test for psa( prostate) , cholesterol, and covid antibody

## 2020-07-08 NOTE — TELEPHONE ENCOUNTER
CBC, CMP, Lipid, PSA, COVID-19 antibody testing ordered.  Please process/notify patient once complete.

## 2020-07-13 ENCOUNTER — LAB VISIT (OUTPATIENT)
Dept: LAB | Facility: HOSPITAL | Age: 50
End: 2020-07-13
Payer: COMMERCIAL

## 2020-07-13 DIAGNOSIS — Z20.822 EXPOSURE TO COVID-19 VIRUS: ICD-10-CM

## 2020-07-13 DIAGNOSIS — Z12.5 SCREENING PSA (PROSTATE SPECIFIC ANTIGEN): ICD-10-CM

## 2020-07-13 DIAGNOSIS — Z00.00 ANNUAL PHYSICAL EXAM: ICD-10-CM

## 2020-07-13 DIAGNOSIS — E78.2 MIXED HYPERLIPIDEMIA: ICD-10-CM

## 2020-07-13 LAB
ALBUMIN SERPL BCP-MCNC: 4.2 G/DL (ref 3.5–5.2)
ALP SERPL-CCNC: 64 U/L (ref 55–135)
ALT SERPL W/O P-5'-P-CCNC: 33 U/L (ref 10–44)
ANION GAP SERPL CALC-SCNC: 10 MMOL/L (ref 8–16)
AST SERPL-CCNC: 30 U/L (ref 10–40)
BASOPHILS # BLD AUTO: 0.07 K/UL (ref 0–0.2)
BASOPHILS NFR BLD: 1.2 % (ref 0–1.9)
BILIRUB SERPL-MCNC: 0.8 MG/DL (ref 0.1–1)
BUN SERPL-MCNC: 15 MG/DL (ref 6–20)
CALCIUM SERPL-MCNC: 9.4 MG/DL (ref 8.7–10.5)
CHLORIDE SERPL-SCNC: 106 MMOL/L (ref 95–110)
CHOLEST SERPL-MCNC: 219 MG/DL (ref 120–199)
CHOLEST/HDLC SERPL: 6.3 {RATIO} (ref 2–5)
CO2 SERPL-SCNC: 25 MMOL/L (ref 23–29)
COMPLEXED PSA SERPL-MCNC: 1.9 NG/ML (ref 0–4)
CREAT SERPL-MCNC: 1.2 MG/DL (ref 0.5–1.4)
DIFFERENTIAL METHOD: ABNORMAL
EOSINOPHIL # BLD AUTO: 0.4 K/UL (ref 0–0.5)
EOSINOPHIL NFR BLD: 7.3 % (ref 0–8)
ERYTHROCYTE [DISTWIDTH] IN BLOOD BY AUTOMATED COUNT: 12.5 % (ref 11.5–14.5)
EST. GFR  (AFRICAN AMERICAN): >60 ML/MIN/1.73 M^2
EST. GFR  (NON AFRICAN AMERICAN): >60 ML/MIN/1.73 M^2
GLUCOSE SERPL-MCNC: 95 MG/DL (ref 70–110)
HCT VFR BLD AUTO: 47.6 % (ref 40–54)
HDLC SERPL-MCNC: 35 MG/DL (ref 40–75)
HDLC SERPL: 16 % (ref 20–50)
HGB BLD-MCNC: 15.8 G/DL (ref 14–18)
IMM GRANULOCYTES # BLD AUTO: 0.02 K/UL (ref 0–0.04)
IMM GRANULOCYTES NFR BLD AUTO: 0.4 % (ref 0–0.5)
LDLC SERPL CALC-MCNC: 137.2 MG/DL (ref 63–159)
LYMPHOCYTES # BLD AUTO: 1.9 K/UL (ref 1–4.8)
LYMPHOCYTES NFR BLD: 34.3 % (ref 18–48)
MCH RBC QN AUTO: 31.3 PG (ref 27–31)
MCHC RBC AUTO-ENTMCNC: 33.2 G/DL (ref 32–36)
MCV RBC AUTO: 94 FL (ref 82–98)
MONOCYTES # BLD AUTO: 0.7 K/UL (ref 0.3–1)
MONOCYTES NFR BLD: 13 % (ref 4–15)
NEUTROPHILS # BLD AUTO: 2.5 K/UL (ref 1.8–7.7)
NEUTROPHILS NFR BLD: 43.8 % (ref 38–73)
NONHDLC SERPL-MCNC: 184 MG/DL
NRBC BLD-RTO: 0 /100 WBC
PLATELET # BLD AUTO: 286 K/UL (ref 150–350)
PMV BLD AUTO: 10.2 FL (ref 9.2–12.9)
POTASSIUM SERPL-SCNC: 4.6 MMOL/L (ref 3.5–5.1)
PROT SERPL-MCNC: 7.3 G/DL (ref 6–8.4)
RBC # BLD AUTO: 5.05 M/UL (ref 4.6–6.2)
SARS-COV-2 IGG SERPLBLD QL IA.RAPID: NEGATIVE
SODIUM SERPL-SCNC: 141 MMOL/L (ref 136–145)
TRIGL SERPL-MCNC: 234 MG/DL (ref 30–150)
WBC # BLD AUTO: 5.62 K/UL (ref 3.9–12.7)

## 2020-07-13 PROCEDURE — 85025 COMPLETE CBC W/AUTO DIFF WBC: CPT

## 2020-07-13 PROCEDURE — 84153 ASSAY OF PSA TOTAL: CPT

## 2020-07-13 PROCEDURE — 80053 COMPREHEN METABOLIC PANEL: CPT

## 2020-07-13 PROCEDURE — 80061 LIPID PANEL: CPT

## 2020-07-13 PROCEDURE — 36415 COLL VENOUS BLD VENIPUNCTURE: CPT

## 2020-07-13 PROCEDURE — 86769 SARS-COV-2 COVID-19 ANTIBODY: CPT

## 2020-10-05 ENCOUNTER — PATIENT MESSAGE (OUTPATIENT)
Dept: ADMINISTRATIVE | Facility: HOSPITAL | Age: 50
End: 2020-10-05

## 2021-01-04 ENCOUNTER — PATIENT MESSAGE (OUTPATIENT)
Dept: ADMINISTRATIVE | Facility: HOSPITAL | Age: 51
End: 2021-01-04

## 2021-03-09 ENCOUNTER — OFFICE VISIT (OUTPATIENT)
Dept: INTERNAL MEDICINE | Facility: CLINIC | Age: 51
End: 2021-03-09
Payer: COMMERCIAL

## 2021-03-09 VITALS
HEIGHT: 73 IN | TEMPERATURE: 99 F | WEIGHT: 233.94 LBS | OXYGEN SATURATION: 97 % | SYSTOLIC BLOOD PRESSURE: 116 MMHG | HEART RATE: 73 BPM | BODY MASS INDEX: 31 KG/M2 | DIASTOLIC BLOOD PRESSURE: 88 MMHG

## 2021-03-09 DIAGNOSIS — Z00.00 ANNUAL PHYSICAL EXAM: Primary | ICD-10-CM

## 2021-03-09 DIAGNOSIS — N52.9 ERECTILE DYSFUNCTION, UNSPECIFIED ERECTILE DYSFUNCTION TYPE: ICD-10-CM

## 2021-03-09 DIAGNOSIS — Z11.4 SCREENING FOR HIV (HUMAN IMMUNODEFICIENCY VIRUS): ICD-10-CM

## 2021-03-09 DIAGNOSIS — Z12.5 SCREENING PSA (PROSTATE SPECIFIC ANTIGEN): ICD-10-CM

## 2021-03-09 DIAGNOSIS — R03.0 ELEVATED BLOOD PRESSURE READING: ICD-10-CM

## 2021-03-09 DIAGNOSIS — N53.12 PAINFUL EJACULATION: ICD-10-CM

## 2021-03-09 DIAGNOSIS — R13.10 DYSPHAGIA, UNSPECIFIED TYPE: ICD-10-CM

## 2021-03-09 DIAGNOSIS — R82.90 ABNORMAL URINE ODOR: ICD-10-CM

## 2021-03-09 DIAGNOSIS — M23.52 RECURRENT LEFT KNEE INSTABILITY: ICD-10-CM

## 2021-03-09 DIAGNOSIS — E78.2 MIXED HYPERLIPIDEMIA: ICD-10-CM

## 2021-03-09 PROCEDURE — 1126F PR PAIN SEVERITY QUANTIFIED, NO PAIN PRESENT: ICD-10-PCS | Mod: S$GLB,,, | Performed by: FAMILY MEDICINE

## 2021-03-09 PROCEDURE — 1126F AMNT PAIN NOTED NONE PRSNT: CPT | Mod: S$GLB,,, | Performed by: FAMILY MEDICINE

## 2021-03-09 PROCEDURE — 99999 PR PBB SHADOW E&M-EST. PATIENT-LVL V: ICD-10-PCS | Mod: PBBFAC,,, | Performed by: FAMILY MEDICINE

## 2021-03-09 PROCEDURE — 99396 PR PREVENTIVE VISIT,EST,40-64: ICD-10-PCS | Mod: S$GLB,,, | Performed by: FAMILY MEDICINE

## 2021-03-09 PROCEDURE — 3008F PR BODY MASS INDEX (BMI) DOCUMENTED: ICD-10-PCS | Mod: CPTII,S$GLB,, | Performed by: FAMILY MEDICINE

## 2021-03-09 PROCEDURE — 3008F BODY MASS INDEX DOCD: CPT | Mod: CPTII,S$GLB,, | Performed by: FAMILY MEDICINE

## 2021-03-09 PROCEDURE — 99999 PR PBB SHADOW E&M-EST. PATIENT-LVL V: CPT | Mod: PBBFAC,,, | Performed by: FAMILY MEDICINE

## 2021-03-09 PROCEDURE — 99396 PREV VISIT EST AGE 40-64: CPT | Mod: S$GLB,,, | Performed by: FAMILY MEDICINE

## 2021-03-09 PROCEDURE — 81003 URINALYSIS AUTO W/O SCOPE: CPT | Performed by: FAMILY MEDICINE

## 2021-03-09 PROCEDURE — 87086 URINE CULTURE/COLONY COUNT: CPT | Performed by: FAMILY MEDICINE

## 2021-03-09 RX ORDER — SILDENAFIL 100 MG/1
TABLET, FILM COATED ORAL
Qty: 15 TABLET | Refills: 0 | Status: SHIPPED | OUTPATIENT
Start: 2021-03-09 | End: 2021-03-10 | Stop reason: SDUPTHER

## 2021-03-10 ENCOUNTER — PATIENT MESSAGE (OUTPATIENT)
Dept: INTERNAL MEDICINE | Facility: CLINIC | Age: 51
End: 2021-03-10

## 2021-03-10 DIAGNOSIS — N52.9 ERECTILE DYSFUNCTION, UNSPECIFIED ERECTILE DYSFUNCTION TYPE: ICD-10-CM

## 2021-03-10 LAB
BILIRUB UR QL STRIP: NEGATIVE
CLARITY UR REFRACT.AUTO: CLEAR
COLOR UR AUTO: YELLOW
GLUCOSE UR QL STRIP: NEGATIVE
HGB UR QL STRIP: NEGATIVE
KETONES UR QL STRIP: NEGATIVE
LEUKOCYTE ESTERASE UR QL STRIP: NEGATIVE
NITRITE UR QL STRIP: NEGATIVE
PH UR STRIP: 5 [PH] (ref 5–8)
PROT UR QL STRIP: NEGATIVE
SP GR UR STRIP: 1.03 (ref 1–1.03)
URN SPEC COLLECT METH UR: NORMAL

## 2021-03-10 RX ORDER — SILDENAFIL 100 MG/1
TABLET, FILM COATED ORAL
Qty: 15 TABLET | Refills: 2 | Status: SHIPPED | OUTPATIENT
Start: 2021-03-10 | End: 2022-03-23

## 2021-03-11 LAB — BACTERIA UR CULT: NO GROWTH

## 2021-03-15 ENCOUNTER — LAB VISIT (OUTPATIENT)
Dept: LAB | Facility: OTHER | Age: 51
End: 2021-03-15
Attending: FAMILY MEDICINE
Payer: COMMERCIAL

## 2021-03-15 DIAGNOSIS — Z12.5 SCREENING PSA (PROSTATE SPECIFIC ANTIGEN): ICD-10-CM

## 2021-03-15 DIAGNOSIS — E78.2 MIXED HYPERLIPIDEMIA: ICD-10-CM

## 2021-03-15 DIAGNOSIS — Z11.4 SCREENING FOR HIV (HUMAN IMMUNODEFICIENCY VIRUS): ICD-10-CM

## 2021-03-15 DIAGNOSIS — R82.90 ABNORMAL URINE ODOR: ICD-10-CM

## 2021-03-15 DIAGNOSIS — Z00.00 ANNUAL PHYSICAL EXAM: ICD-10-CM

## 2021-03-15 LAB
25(OH)D3+25(OH)D2 SERPL-MCNC: 29 NG/ML (ref 30–96)
ALBUMIN SERPL BCP-MCNC: 4.3 G/DL (ref 3.5–5.2)
ALP SERPL-CCNC: 61 U/L (ref 55–135)
ALT SERPL W/O P-5'-P-CCNC: 41 U/L (ref 10–44)
ANION GAP SERPL CALC-SCNC: 10 MMOL/L (ref 8–16)
AST SERPL-CCNC: 45 U/L (ref 10–40)
BASOPHILS # BLD AUTO: 0.06 K/UL (ref 0–0.2)
BASOPHILS NFR BLD: 1.2 % (ref 0–1.9)
BILIRUB SERPL-MCNC: 0.6 MG/DL (ref 0.1–1)
BUN SERPL-MCNC: 18 MG/DL (ref 6–20)
CALCIUM SERPL-MCNC: 9.8 MG/DL (ref 8.7–10.5)
CHLORIDE SERPL-SCNC: 106 MMOL/L (ref 95–110)
CHOLEST SERPL-MCNC: 220 MG/DL (ref 120–199)
CHOLEST/HDLC SERPL: 6.5 {RATIO} (ref 2–5)
CO2 SERPL-SCNC: 26 MMOL/L (ref 23–29)
COMPLEXED PSA SERPL-MCNC: 1.7 NG/ML (ref 0–4)
CREAT SERPL-MCNC: 1.1 MG/DL (ref 0.5–1.4)
DIFFERENTIAL METHOD: NORMAL
EOSINOPHIL # BLD AUTO: 0.3 K/UL (ref 0–0.5)
EOSINOPHIL NFR BLD: 6.3 % (ref 0–8)
ERYTHROCYTE [DISTWIDTH] IN BLOOD BY AUTOMATED COUNT: 12.6 % (ref 11.5–14.5)
EST. GFR  (AFRICAN AMERICAN): >60 ML/MIN/1.73 M^2
EST. GFR  (NON AFRICAN AMERICAN): >60 ML/MIN/1.73 M^2
ESTIMATED AVG GLUCOSE: 97 MG/DL (ref 68–131)
GLUCOSE SERPL-MCNC: 103 MG/DL (ref 70–110)
HBA1C MFR BLD: 5 % (ref 4–5.6)
HCT VFR BLD AUTO: 44.4 % (ref 40–54)
HDLC SERPL-MCNC: 34 MG/DL (ref 40–75)
HDLC SERPL: 15.5 % (ref 20–50)
HGB BLD-MCNC: 15 G/DL (ref 14–18)
IMM GRANULOCYTES # BLD AUTO: 0.02 K/UL (ref 0–0.04)
IMM GRANULOCYTES NFR BLD AUTO: 0.4 % (ref 0–0.5)
LDLC SERPL CALC-MCNC: 139.4 MG/DL (ref 63–159)
LYMPHOCYTES # BLD AUTO: 1.7 K/UL (ref 1–4.8)
LYMPHOCYTES NFR BLD: 33.4 % (ref 18–48)
MCH RBC QN AUTO: 30.9 PG (ref 27–31)
MCHC RBC AUTO-ENTMCNC: 33.8 G/DL (ref 32–36)
MCV RBC AUTO: 91 FL (ref 82–98)
MONOCYTES # BLD AUTO: 0.6 K/UL (ref 0.3–1)
MONOCYTES NFR BLD: 12 % (ref 4–15)
NEUTROPHILS # BLD AUTO: 2.4 K/UL (ref 1.8–7.7)
NEUTROPHILS NFR BLD: 46.7 % (ref 38–73)
NONHDLC SERPL-MCNC: 186 MG/DL
NRBC BLD-RTO: 0 /100 WBC
PLATELET # BLD AUTO: 287 K/UL (ref 150–350)
PMV BLD AUTO: 9.7 FL (ref 9.2–12.9)
POTASSIUM SERPL-SCNC: 4.6 MMOL/L (ref 3.5–5.1)
PROT SERPL-MCNC: 7.5 G/DL (ref 6–8.4)
RBC # BLD AUTO: 4.86 M/UL (ref 4.6–6.2)
SODIUM SERPL-SCNC: 142 MMOL/L (ref 136–145)
TRIGL SERPL-MCNC: 233 MG/DL (ref 30–150)
WBC # BLD AUTO: 5.09 K/UL (ref 3.9–12.7)

## 2021-03-15 PROCEDURE — 86703 HIV-1/HIV-2 1 RESULT ANTBDY: CPT | Performed by: FAMILY MEDICINE

## 2021-03-15 PROCEDURE — 84153 ASSAY OF PSA TOTAL: CPT | Performed by: FAMILY MEDICINE

## 2021-03-15 PROCEDURE — 82306 VITAMIN D 25 HYDROXY: CPT | Performed by: FAMILY MEDICINE

## 2021-03-15 PROCEDURE — 86769 SARS-COV-2 COVID-19 ANTIBODY: CPT | Performed by: FAMILY MEDICINE

## 2021-03-15 PROCEDURE — 83036 HEMOGLOBIN GLYCOSYLATED A1C: CPT | Performed by: FAMILY MEDICINE

## 2021-03-15 PROCEDURE — 80061 LIPID PANEL: CPT | Performed by: FAMILY MEDICINE

## 2021-03-15 PROCEDURE — 80053 COMPREHEN METABOLIC PANEL: CPT | Performed by: FAMILY MEDICINE

## 2021-03-15 PROCEDURE — 85025 COMPLETE CBC W/AUTO DIFF WBC: CPT | Performed by: FAMILY MEDICINE

## 2021-03-16 ENCOUNTER — TELEPHONE (OUTPATIENT)
Dept: INTERNAL MEDICINE | Facility: CLINIC | Age: 51
End: 2021-03-16

## 2021-03-16 ENCOUNTER — CLINICAL SUPPORT (OUTPATIENT)
Dept: REHABILITATION | Facility: OTHER | Age: 51
End: 2021-03-16
Payer: COMMERCIAL

## 2021-03-16 DIAGNOSIS — R74.01 ELEVATED AST (SGOT): Primary | ICD-10-CM

## 2021-03-16 DIAGNOSIS — E55.9 VITAMIN D DEFICIENCY: ICD-10-CM

## 2021-03-16 DIAGNOSIS — M23.52 RECURRENT LEFT KNEE INSTABILITY: ICD-10-CM

## 2021-03-16 PROBLEM — Z47.89 AFTERCARE FOR ANTERIOR CRUCIATE LIGAMENT (ACL) REPAIR: Status: RESOLVED | Noted: 2017-09-29 | Resolved: 2021-03-16

## 2021-03-16 LAB
HIV 1+2 AB+HIV1 P24 AG SERPL QL IA: NEGATIVE
SARS-COV-2 IGG SERPLBLD QL IA.RAPID: NEGATIVE

## 2021-03-16 PROCEDURE — 97161 PT EVAL LOW COMPLEX 20 MIN: CPT | Mod: PN | Performed by: PHYSICAL THERAPIST

## 2021-03-16 PROCEDURE — 97110 THERAPEUTIC EXERCISES: CPT | Mod: PN | Performed by: PHYSICAL THERAPIST

## 2021-03-31 ENCOUNTER — IMMUNIZATION (OUTPATIENT)
Dept: PRIMARY CARE CLINIC | Facility: CLINIC | Age: 51
End: 2021-03-31
Payer: COMMERCIAL

## 2021-03-31 DIAGNOSIS — Z23 NEED FOR VACCINATION: Primary | ICD-10-CM

## 2021-03-31 PROCEDURE — 0031A PR IMMUNIZ ADMIN, SARS-COV-2 COVID-19 VACC, 5X10VP/0.5ML: CPT | Mod: CV19,S$GLB,, | Performed by: INTERNAL MEDICINE

## 2021-03-31 PROCEDURE — 91303 PR SARSCOV2 VAC AD26 .5ML IM: CPT | Mod: S$GLB,,, | Performed by: INTERNAL MEDICINE

## 2021-03-31 PROCEDURE — 91303 PR SARSCOV2 VAC AD26 .5ML IM: ICD-10-PCS | Mod: S$GLB,,, | Performed by: INTERNAL MEDICINE

## 2021-03-31 PROCEDURE — 0031A PR IMMUNIZ ADMIN, SARS-COV-2 COVID-19 VACC, 5X10VP/0.5ML: ICD-10-PCS | Mod: CV19,S$GLB,, | Performed by: INTERNAL MEDICINE

## 2021-04-13 ENCOUNTER — CLINICAL SUPPORT (OUTPATIENT)
Dept: REHABILITATION | Facility: OTHER | Age: 51
End: 2021-04-13
Payer: COMMERCIAL

## 2021-04-13 DIAGNOSIS — G89.29 CHRONIC PAIN OF LEFT KNEE: ICD-10-CM

## 2021-04-13 DIAGNOSIS — M25.562 CHRONIC PAIN OF LEFT KNEE: ICD-10-CM

## 2021-04-13 PROCEDURE — 97110 THERAPEUTIC EXERCISES: CPT | Mod: PN,97 | Performed by: PHYSICAL THERAPIST

## 2021-05-04 ENCOUNTER — CLINICAL SUPPORT (OUTPATIENT)
Dept: REHABILITATION | Facility: OTHER | Age: 51
End: 2021-05-04
Payer: COMMERCIAL

## 2021-05-04 DIAGNOSIS — M25.562 CHRONIC PAIN OF LEFT KNEE: ICD-10-CM

## 2021-05-04 DIAGNOSIS — G89.29 CHRONIC PAIN OF LEFT KNEE: ICD-10-CM

## 2021-05-04 PROCEDURE — 97110 THERAPEUTIC EXERCISES: CPT | Mod: PN,97 | Performed by: PHYSICAL THERAPIST

## 2021-05-25 ENCOUNTER — PATIENT MESSAGE (OUTPATIENT)
Dept: REHABILITATION | Facility: OTHER | Age: 51
End: 2021-05-25

## 2021-05-28 DIAGNOSIS — A60.00 GENITAL HERPES SIMPLEX, UNSPECIFIED SITE: ICD-10-CM

## 2021-05-31 RX ORDER — VALACYCLOVIR HYDROCHLORIDE 1 G/1
TABLET, FILM COATED ORAL
Qty: 30 TABLET | Refills: 2 | Status: SHIPPED | OUTPATIENT
Start: 2021-05-31 | End: 2022-12-28

## 2021-06-15 ENCOUNTER — CLINICAL SUPPORT (OUTPATIENT)
Dept: REHABILITATION | Facility: OTHER | Age: 51
End: 2021-06-15
Payer: COMMERCIAL

## 2021-06-15 DIAGNOSIS — G89.29 CHRONIC PAIN OF LEFT KNEE: ICD-10-CM

## 2021-06-15 DIAGNOSIS — M25.562 CHRONIC PAIN OF LEFT KNEE: ICD-10-CM

## 2021-06-15 PROCEDURE — 97110 THERAPEUTIC EXERCISES: CPT | Mod: PN,97 | Performed by: PHYSICAL THERAPIST

## 2021-06-17 ENCOUNTER — OFFICE VISIT (OUTPATIENT)
Dept: PODIATRY | Facility: CLINIC | Age: 51
End: 2021-06-17
Payer: COMMERCIAL

## 2021-06-17 ENCOUNTER — OFFICE VISIT (OUTPATIENT)
Dept: GASTROENTEROLOGY | Facility: CLINIC | Age: 51
End: 2021-06-17
Payer: COMMERCIAL

## 2021-06-17 ENCOUNTER — APPOINTMENT (OUTPATIENT)
Dept: RADIOLOGY | Facility: OTHER | Age: 51
End: 2021-06-17
Attending: PODIATRIST
Payer: COMMERCIAL

## 2021-06-17 VITALS
WEIGHT: 235 LBS | HEART RATE: 59 BPM | SYSTOLIC BLOOD PRESSURE: 123 MMHG | HEIGHT: 73 IN | BODY MASS INDEX: 31.14 KG/M2 | DIASTOLIC BLOOD PRESSURE: 85 MMHG

## 2021-06-17 VITALS
HEART RATE: 60 BPM | BODY MASS INDEX: 31.14 KG/M2 | HEIGHT: 73 IN | WEIGHT: 235 LBS | DIASTOLIC BLOOD PRESSURE: 78 MMHG | SYSTOLIC BLOOD PRESSURE: 134 MMHG

## 2021-06-17 DIAGNOSIS — Z12.12 ENCOUNTER FOR SCREENING FOR COLORECTAL MALIGNANT NEOPLASM: ICD-10-CM

## 2021-06-17 DIAGNOSIS — M72.2 PLANTAR FASCIITIS: ICD-10-CM

## 2021-06-17 DIAGNOSIS — S96.912A STRAIN OF ANKLE AND FOOT, LEFT, INITIAL ENCOUNTER: ICD-10-CM

## 2021-06-17 DIAGNOSIS — M24.573 EQUINUS CONTRACTURE OF ANKLE: ICD-10-CM

## 2021-06-17 DIAGNOSIS — M72.2 PLANTAR FASCIITIS: Primary | ICD-10-CM

## 2021-06-17 DIAGNOSIS — R13.10 DYSPHAGIA, UNSPECIFIED TYPE: Primary | ICD-10-CM

## 2021-06-17 DIAGNOSIS — M79.672 FOOT PAIN, LEFT: ICD-10-CM

## 2021-06-17 DIAGNOSIS — Z12.11 ENCOUNTER FOR SCREENING FOR COLORECTAL MALIGNANT NEOPLASM: ICD-10-CM

## 2021-06-17 PROCEDURE — 73630 X-RAY EXAM OF FOOT: CPT | Mod: TC,LT

## 2021-06-17 PROCEDURE — 99999 PR PBB SHADOW E&M-EST. PATIENT-LVL III: ICD-10-PCS | Mod: PBBFAC,,, | Performed by: PODIATRIST

## 2021-06-17 PROCEDURE — 99999 PR PBB SHADOW E&M-EST. PATIENT-LVL III: ICD-10-PCS | Mod: PBBFAC,,, | Performed by: FAMILY MEDICINE

## 2021-06-17 PROCEDURE — 99999 PR PBB SHADOW E&M-EST. PATIENT-LVL III: CPT | Mod: PBBFAC,,, | Performed by: PODIATRIST

## 2021-06-17 PROCEDURE — 99204 PR OFFICE/OUTPT VISIT, NEW, LEVL IV, 45-59 MIN: ICD-10-PCS | Mod: S$GLB,,, | Performed by: FAMILY MEDICINE

## 2021-06-17 PROCEDURE — 29540 PR STRAPPING; ANKLE &/OR FOOT: ICD-10-PCS | Mod: LT,S$GLB,, | Performed by: PODIATRIST

## 2021-06-17 PROCEDURE — 1125F PR PAIN SEVERITY QUANTIFIED, PAIN PRESENT: ICD-10-PCS | Mod: S$GLB,,, | Performed by: PODIATRIST

## 2021-06-17 PROCEDURE — 3008F PR BODY MASS INDEX (BMI) DOCUMENTED: ICD-10-PCS | Mod: CPTII,S$GLB,, | Performed by: PODIATRIST

## 2021-06-17 PROCEDURE — 3008F BODY MASS INDEX DOCD: CPT | Mod: CPTII,S$GLB,, | Performed by: PODIATRIST

## 2021-06-17 PROCEDURE — 1126F AMNT PAIN NOTED NONE PRSNT: CPT | Mod: S$GLB,,, | Performed by: FAMILY MEDICINE

## 2021-06-17 PROCEDURE — 1125F AMNT PAIN NOTED PAIN PRSNT: CPT | Mod: S$GLB,,, | Performed by: PODIATRIST

## 2021-06-17 PROCEDURE — 29540 STRAPPING ANKLE &/FOOT: CPT | Mod: LT,S$GLB,, | Performed by: PODIATRIST

## 2021-06-17 PROCEDURE — 99999 PR PBB SHADOW E&M-EST. PATIENT-LVL III: CPT | Mod: PBBFAC,,, | Performed by: FAMILY MEDICINE

## 2021-06-17 PROCEDURE — 3008F PR BODY MASS INDEX (BMI) DOCUMENTED: ICD-10-PCS | Mod: CPTII,S$GLB,, | Performed by: FAMILY MEDICINE

## 2021-06-17 PROCEDURE — 1126F PR PAIN SEVERITY QUANTIFIED, NO PAIN PRESENT: ICD-10-PCS | Mod: S$GLB,,, | Performed by: FAMILY MEDICINE

## 2021-06-17 PROCEDURE — 99214 OFFICE O/P EST MOD 30 MIN: CPT | Mod: 25,S$GLB,, | Performed by: PODIATRIST

## 2021-06-17 PROCEDURE — 73630 X-RAY EXAM OF FOOT: CPT | Mod: 26,LT,, | Performed by: RADIOLOGY

## 2021-06-17 PROCEDURE — 3008F BODY MASS INDEX DOCD: CPT | Mod: CPTII,S$GLB,, | Performed by: FAMILY MEDICINE

## 2021-06-17 PROCEDURE — 99204 OFFICE O/P NEW MOD 45 MIN: CPT | Mod: S$GLB,,, | Performed by: FAMILY MEDICINE

## 2021-06-17 PROCEDURE — 73630 XR FOOT COMPLETE 3 VIEW LEFT: ICD-10-PCS | Mod: 26,LT,, | Performed by: RADIOLOGY

## 2021-06-17 PROCEDURE — 99214 PR OFFICE/OUTPT VISIT, EST, LEVL IV, 30-39 MIN: ICD-10-PCS | Mod: 25,S$GLB,, | Performed by: PODIATRIST

## 2021-06-17 RX ORDER — MELOXICAM 15 MG/1
15 TABLET ORAL DAILY
Qty: 30 TABLET | Refills: 0 | Status: SHIPPED | OUTPATIENT
Start: 2021-06-17 | End: 2022-02-02

## 2021-06-18 ENCOUNTER — TELEPHONE (OUTPATIENT)
Dept: ENDOSCOPY | Facility: HOSPITAL | Age: 51
End: 2021-06-18

## 2021-06-18 ENCOUNTER — PATIENT MESSAGE (OUTPATIENT)
Dept: ENDOSCOPY | Facility: HOSPITAL | Age: 51
End: 2021-06-18

## 2021-06-18 DIAGNOSIS — Z12.11 SPECIAL SCREENING FOR MALIGNANT NEOPLASMS, COLON: Primary | ICD-10-CM

## 2021-06-18 RX ORDER — SODIUM, POTASSIUM,MAG SULFATES 17.5-3.13G
1 SOLUTION, RECONSTITUTED, ORAL ORAL DAILY
Qty: 1 KIT | Refills: 0 | Status: SHIPPED | OUTPATIENT
Start: 2021-06-18 | End: 2021-06-20

## 2021-07-01 ENCOUNTER — HOSPITAL ENCOUNTER (OUTPATIENT)
Dept: RADIOLOGY | Facility: HOSPITAL | Age: 51
Discharge: HOME | End: 2021-07-01
Attending: FAMILY MEDICINE
Payer: COMMERCIAL

## 2021-07-01 DIAGNOSIS — R13.10 DYSPHAGIA, UNSPECIFIED TYPE: ICD-10-CM

## 2021-07-01 PROCEDURE — A9698 NON-RAD CONTRAST MATERIALNOC: HCPCS | Performed by: FAMILY MEDICINE

## 2021-07-01 PROCEDURE — 74220 X-RAY XM ESOPHAGUS 1CNTRST: CPT | Mod: 26,,, | Performed by: RADIOLOGY

## 2021-07-01 PROCEDURE — 74220 FL ESOPHAGRAM WITH BARIUM TABLET: ICD-10-PCS | Mod: 26,,, | Performed by: RADIOLOGY

## 2021-07-01 PROCEDURE — 74220 X-RAY XM ESOPHAGUS 1CNTRST: CPT | Mod: TC

## 2021-07-01 PROCEDURE — 25500020 PHARM REV CODE 255: Performed by: FAMILY MEDICINE

## 2021-07-01 RX ADMIN — BARIUM SULFATE 150 ML: 0.6 SUSPENSION ORAL at 09:07

## 2021-07-17 ENCOUNTER — PATIENT MESSAGE (OUTPATIENT)
Dept: ENDOSCOPY | Facility: HOSPITAL | Age: 51
End: 2021-07-17

## 2021-07-22 ENCOUNTER — ANESTHESIA (OUTPATIENT)
Dept: ENDOSCOPY | Facility: HOSPITAL | Age: 51
End: 2021-07-22
Payer: COMMERCIAL

## 2021-07-22 ENCOUNTER — ANESTHESIA EVENT (OUTPATIENT)
Dept: ENDOSCOPY | Facility: HOSPITAL | Age: 51
End: 2021-07-22
Payer: COMMERCIAL

## 2021-07-22 ENCOUNTER — HOSPITAL ENCOUNTER (OUTPATIENT)
Facility: HOSPITAL | Age: 51
Discharge: HOME | End: 2021-07-22
Attending: INTERNAL MEDICINE | Admitting: INTERNAL MEDICINE
Payer: COMMERCIAL

## 2021-07-22 VITALS
HEIGHT: 73 IN | HEART RATE: 73 BPM | BODY MASS INDEX: 30.48 KG/M2 | TEMPERATURE: 98 F | DIASTOLIC BLOOD PRESSURE: 82 MMHG | OXYGEN SATURATION: 100 % | RESPIRATION RATE: 18 BRPM | SYSTOLIC BLOOD PRESSURE: 136 MMHG | WEIGHT: 230 LBS

## 2021-07-22 DIAGNOSIS — R13.19 ESOPHAGEAL DYSPHAGIA: Primary | ICD-10-CM

## 2021-07-22 PROBLEM — R13.10 DYSPHAGIA: Status: ACTIVE | Noted: 2021-07-22

## 2021-07-22 PROCEDURE — 88305 TISSUE EXAM BY PATHOLOGIST: ICD-10-PCS | Mod: 26,,, | Performed by: PATHOLOGY

## 2021-07-22 PROCEDURE — 45385 COLONOSCOPY W/LESION REMOVAL: CPT | Performed by: INTERNAL MEDICINE

## 2021-07-22 PROCEDURE — 63600175 PHARM REV CODE 636 W HCPCS: Performed by: NURSE ANESTHETIST, CERTIFIED REGISTERED

## 2021-07-22 PROCEDURE — E9220 PRA ENDO ANESTHESIA: HCPCS | Mod: 33,,, | Performed by: NURSE ANESTHETIST, CERTIFIED REGISTERED

## 2021-07-22 PROCEDURE — 88305 TISSUE EXAM BY PATHOLOGIST: CPT | Mod: 59 | Performed by: PATHOLOGY

## 2021-07-22 PROCEDURE — 45385 COLONOSCOPY W/LESION REMOVAL: CPT | Mod: 33,,, | Performed by: INTERNAL MEDICINE

## 2021-07-22 PROCEDURE — 43249 PR EGD, FLEX, W/BALL DILATION, < 30MM: ICD-10-PCS | Mod: 51,,, | Performed by: INTERNAL MEDICINE

## 2021-07-22 PROCEDURE — 27201012 HC FORCEPS, HOT/COLD, DISP: Performed by: INTERNAL MEDICINE

## 2021-07-22 PROCEDURE — 25000003 PHARM REV CODE 250: Performed by: NURSE ANESTHETIST, CERTIFIED REGISTERED

## 2021-07-22 PROCEDURE — 43249 ESOPH EGD DILATION <30 MM: CPT | Mod: 51,,, | Performed by: INTERNAL MEDICINE

## 2021-07-22 PROCEDURE — 37000008 HC ANESTHESIA 1ST 15 MINUTES: Performed by: INTERNAL MEDICINE

## 2021-07-22 PROCEDURE — 43249 ESOPH EGD DILATION <30 MM: CPT | Performed by: INTERNAL MEDICINE

## 2021-07-22 PROCEDURE — 37000009 HC ANESTHESIA EA ADD 15 MINS: Performed by: INTERNAL MEDICINE

## 2021-07-22 PROCEDURE — E9220 PRA ENDO ANESTHESIA: ICD-10-PCS | Mod: 33,,, | Performed by: NURSE ANESTHETIST, CERTIFIED REGISTERED

## 2021-07-22 PROCEDURE — 88305 TISSUE EXAM BY PATHOLOGIST: CPT | Mod: 26,,, | Performed by: PATHOLOGY

## 2021-07-22 PROCEDURE — 45385 PR COLONOSCOPY,REMV LESN,SNARE: ICD-10-PCS | Mod: 33,,, | Performed by: INTERNAL MEDICINE

## 2021-07-22 PROCEDURE — C1726 CATH, BAL DIL, NON-VASCULAR: HCPCS | Performed by: INTERNAL MEDICINE

## 2021-07-22 PROCEDURE — 43239 EGD BIOPSY SINGLE/MULTIPLE: CPT | Performed by: INTERNAL MEDICINE

## 2021-07-22 PROCEDURE — 27201089 HC SNARE, DISP (ANY): Performed by: INTERNAL MEDICINE

## 2021-07-22 RX ORDER — PROPOFOL 10 MG/ML
VIAL (ML) INTRAVENOUS CONTINUOUS PRN
Status: DISCONTINUED | OUTPATIENT
Start: 2021-07-22 | End: 2021-07-22

## 2021-07-22 RX ORDER — SODIUM CHLORIDE 9 MG/ML
INJECTION, SOLUTION INTRAVENOUS CONTINUOUS
Status: DISCONTINUED | OUTPATIENT
Start: 2021-07-22 | End: 2021-07-22 | Stop reason: HOSPADM

## 2021-07-22 RX ORDER — LIDOCAINE HYDROCHLORIDE 20 MG/ML
INJECTION INTRAVENOUS
Status: DISCONTINUED | OUTPATIENT
Start: 2021-07-22 | End: 2021-07-22

## 2021-07-22 RX ADMIN — SODIUM CHLORIDE: 0.9 INJECTION, SOLUTION INTRAVENOUS at 01:07

## 2021-07-22 RX ADMIN — GLYCOPYRROLATE 0.2 MG: 0.2 INJECTION, SOLUTION INTRAMUSCULAR; INTRAVITREAL at 01:07

## 2021-07-22 RX ADMIN — LIDOCAINE HYDROCHLORIDE 75 MG: 20 INJECTION, SOLUTION INTRAVENOUS at 01:07

## 2021-07-22 RX ADMIN — PROPOFOL 200 MCG/KG/MIN: 10 INJECTION, EMULSION INTRAVENOUS at 01:07

## 2021-07-29 LAB
FINAL PATHOLOGIC DIAGNOSIS: NORMAL
Lab: NORMAL

## 2021-08-02 ENCOUNTER — TELEPHONE (OUTPATIENT)
Dept: ENDOSCOPY | Facility: HOSPITAL | Age: 51
End: 2021-08-02

## 2021-08-03 ENCOUNTER — TELEPHONE (OUTPATIENT)
Dept: ENDOSCOPY | Facility: HOSPITAL | Age: 51
End: 2021-08-03

## 2021-09-08 ENCOUNTER — PATIENT MESSAGE (OUTPATIENT)
Dept: GASTROENTEROLOGY | Facility: CLINIC | Age: 51
End: 2021-09-08

## 2021-11-17 ENCOUNTER — PATIENT MESSAGE (OUTPATIENT)
Dept: PODIATRY | Facility: CLINIC | Age: 51
End: 2021-11-17
Payer: COMMERCIAL

## 2021-11-18 ENCOUNTER — TELEPHONE (OUTPATIENT)
Dept: PODIATRY | Facility: CLINIC | Age: 51
End: 2021-11-18
Payer: COMMERCIAL

## 2022-02-02 ENCOUNTER — OFFICE VISIT (OUTPATIENT)
Dept: PODIATRY | Facility: CLINIC | Age: 52
End: 2022-02-02
Payer: COMMERCIAL

## 2022-02-02 ENCOUNTER — CLINICAL SUPPORT (OUTPATIENT)
Dept: REHABILITATION | Facility: OTHER | Age: 52
End: 2022-02-02
Payer: COMMERCIAL

## 2022-02-02 VITALS
SYSTOLIC BLOOD PRESSURE: 126 MMHG | HEIGHT: 73 IN | DIASTOLIC BLOOD PRESSURE: 84 MMHG | BODY MASS INDEX: 30.48 KG/M2 | WEIGHT: 230 LBS | HEART RATE: 62 BPM

## 2022-02-02 DIAGNOSIS — M72.2 PLANTAR FASCIITIS: ICD-10-CM

## 2022-02-02 DIAGNOSIS — M24.573 EQUINUS CONTRACTURE OF ANKLE: ICD-10-CM

## 2022-02-02 DIAGNOSIS — M72.2 PLANTAR FASCIITIS: Primary | ICD-10-CM

## 2022-02-02 DIAGNOSIS — M79.672 LEFT FOOT PAIN: ICD-10-CM

## 2022-02-02 DIAGNOSIS — M79.672 FOOT PAIN, LEFT: ICD-10-CM

## 2022-02-02 DIAGNOSIS — R29.898 WEAKNESS OF LEFT LOWER EXTREMITY: ICD-10-CM

## 2022-02-02 PROCEDURE — 97161 PT EVAL LOW COMPLEX 20 MIN: CPT | Mod: PN | Performed by: PHYSICAL THERAPIST

## 2022-02-02 PROCEDURE — 99214 PR OFFICE/OUTPT VISIT, EST, LEVL IV, 30-39 MIN: ICD-10-PCS | Mod: S$GLB,,, | Performed by: PODIATRIST

## 2022-02-02 PROCEDURE — 3079F DIAST BP 80-89 MM HG: CPT | Mod: CPTII,S$GLB,, | Performed by: PODIATRIST

## 2022-02-02 PROCEDURE — 3008F PR BODY MASS INDEX (BMI) DOCUMENTED: ICD-10-PCS | Mod: CPTII,S$GLB,, | Performed by: PODIATRIST

## 2022-02-02 PROCEDURE — 99999 PR PBB SHADOW E&M-EST. PATIENT-LVL IV: CPT | Mod: PBBFAC,,, | Performed by: PODIATRIST

## 2022-02-02 PROCEDURE — 97110 THERAPEUTIC EXERCISES: CPT | Mod: PN | Performed by: PHYSICAL THERAPIST

## 2022-02-02 PROCEDURE — 1159F PR MEDICATION LIST DOCUMENTED IN MEDICAL RECORD: ICD-10-PCS | Mod: CPTII,S$GLB,, | Performed by: PODIATRIST

## 2022-02-02 PROCEDURE — 1159F MED LIST DOCD IN RCRD: CPT | Mod: CPTII,S$GLB,, | Performed by: PODIATRIST

## 2022-02-02 PROCEDURE — 3079F PR MOST RECENT DIASTOLIC BLOOD PRESSURE 80-89 MM HG: ICD-10-PCS | Mod: CPTII,S$GLB,, | Performed by: PODIATRIST

## 2022-02-02 PROCEDURE — 3008F BODY MASS INDEX DOCD: CPT | Mod: CPTII,S$GLB,, | Performed by: PODIATRIST

## 2022-02-02 PROCEDURE — 3074F SYST BP LT 130 MM HG: CPT | Mod: CPTII,S$GLB,, | Performed by: PODIATRIST

## 2022-02-02 PROCEDURE — 3074F PR MOST RECENT SYSTOLIC BLOOD PRESSURE < 130 MM HG: ICD-10-PCS | Mod: CPTII,S$GLB,, | Performed by: PODIATRIST

## 2022-02-02 PROCEDURE — 99214 OFFICE O/P EST MOD 30 MIN: CPT | Mod: S$GLB,,, | Performed by: PODIATRIST

## 2022-02-02 PROCEDURE — 99999 PR PBB SHADOW E&M-EST. PATIENT-LVL IV: ICD-10-PCS | Mod: PBBFAC,,, | Performed by: PODIATRIST

## 2022-02-02 RX ORDER — MELOXICAM 15 MG/1
15 TABLET ORAL DAILY
Qty: 30 TABLET | Refills: 0 | Status: SHIPPED | OUTPATIENT
Start: 2022-02-02 | End: 2022-08-24

## 2022-02-02 NOTE — PLAN OF CARE
[facility]   Physical Therapy Initial Evaluation     Date: 2/2/2022   Name: Erich Chery  Clinic Number: [id number]    Therapy Diagnosis:   1. Plantar fasciitis  Ambulatory referral/consult to Physical/Occupational Therapy   2. Equinus contracture of ankle  Ambulatory referral/consult to Physical/Occupational Therapy   3. Foot pain, left  Ambulatory referral/consult to Physical/Occupational Therapy   4. Left foot pain     5. Weakness of left lower extremity       Physician: Nicolas Castanon, NILESH    Physician Orders: PT Eval and Treat   modalities prn  Medical Diagnosis from Referral:   M72.2 (ICD-10-CM) - Plantar fascial fibromatosis   M24.573 (ICD-10-CM) - Contracture, unspecified ankle   M79.672 (ICD-10-CM) - Pain in left foot     Evaluation Date: 2/2/2022  Authorization Period Expiration: 2/2/2023  Plan of Care Expiration: 4/29/2022  Progress Note Due: 3/2/2022  Visit # / Visits authorized: 1/ 1   FOTO: 1/5    Precautions: Standard     Time In: 2:45 pm  Time Out: 3:55 pm  Total Appointment Time (timed & untimed codes): 60 minutes      SUBJECTIVE     Date of onset: 5/2021    History of current condition - Erich reports: Main goal is to strengthen L knee. He has not felt as strong as the right side since ACL surgery in 2016. When he was in therapy last, he was advised to see podiatry for L foot. Was having a sharp pain in the midfoot left and in the heel.  Onset of pain during a game as soccer last summer and seen by Dr. Castanon. Had xrays and no fracture seen. Reports that his foot pain is 'not bad' and knee pain is tolerable.     Falls: none    Imaging, see chart    Prior Therapy: yes, for ACL and shoulder  Social History: coaching youth soccer   Occupation: [employer]  Prior Level of Function: independent with ADL's, soccer, running  Current Level of Function: a little difficulty running/ walking over 1 mile, moderate difficulty with running on uneven ground or marking  sharp turns     Pain:  Current 0/10, worst 4/10, best 0/10   Location:l anterior knee and L heel, and mid tarsal area  Description: Aching and Sharp  Aggravating Factors: running, squatting   Easing Factors: rest    Patients goals: To strengthen L leg and be ready for ski trip in March     Medical History:   Past Medical History:   Diagnosis Date    Asthma     Deviated septum     Genital herpes in men     Hyperlipidemia     Obesity     Tobacco use 11/28/2018       Surgical History:   Erich Chery  has a past surgical history that includes Knee surgery; Esophagogastroduodenoscopy (N/A, 7/22/2021); and Colonoscopy (N/A, 7/22/2021).    Medications:   Erich has a current medication list which includes the following prescription(s): albuterol, fluticasone propionate, meloxicam, sildenafil, and valacyclovir.    Allergies:   Review of patient's allergies indicates:  No Known Allergies       OBJECTIVE       Selective Functional Movement Assessment:  FN: functional, non-painful  FP: functional, painful  DP: dysfunctional, painful  DN: dysfunctional, non-painful    Multi-Segmental Flexion: DN (fingertips to mid shin)  Multi-Segmental Extension: DN (ASIS does not pass toes, hinged lumbar spine)  Multi-Segmental Rotation:   Right:DN  Left:DN  Single Leg Stance:  Right:DN (unable 10 sec EC)  Left:DN (unable 10 sec EC)  Overhead Deep Squat: DN (unable to maintain upright trunk, increased rearfoot calcaneal eversion)      Ankle AROM/PROM  Left  Right    Dorsiflexion:   10 deg  10 deg (marked increase with knee flexion)     1st MTP extension:   60  60 deg    Measure in degrees, *indicates pain with movement    Knee Range of Motion: 0-130 deg B and painless       Lower Extremity Strength    MMT   Left  Right    Hip:  Flexion   5/5  5/5  Extension  5/5  5/5  Abduction  4+/5  5/5  Adduction  5/5  5/5  External Rotation 4+/5  4+/5  Internal rotation 5/5  5/5    Knee:  Flexion   5/5  5/5  Extension  5/5  5/5 (max of 3  "trials 78 L and 76 R on handheld dynamometer)     Ankle:  Dorsiflexion  5/5  5/5  Plantar flexion  5/5  5/5  Inversion  5/5  5/5  Eversion  5/5  5/5    Joint Mobility: decreased B AP talocrural joint mobility       Palpation: no TTP medial calcaneal tubercle and negative windlass test    Sensation: grossly intact all LE's    Flexibility:    Ely's test: + B   SLR: 50 deg B   Laurita's test: R = + ; L = +   Juan Ramon test: R = + ; L = +      Limitation/Restriction for FOTO foot and ankle Survey    Therapist reviewed FOTO scores for Erich Chery on 2/2/2022.   FOTO documents entered into [employer] - see Media section.    Limitation Score: 25%         TREATMENT     Total Treatment time (time-based codes) separate from Evaluation: 30 minutes      Erich received the treatments listed below:      therapeutic exercises to develop strength, endurance, ROM, flexibility and core stabilization for 30 minutes including:  Half kneeling soleus str 30"  IT band str with strap 30"  Dynamic warm up (knee hugs, quad pulls, lunges with twist) 2 laps each  SL RDL with row BTB x 20 ea  Squats with B ER BTB x 20  Lateral agility Blue sports cord 90 sec      PATIENT EDUCATION AND HOME EXERCISES     Education provided:   - HEP    Written Home Exercises Provided: yes. Exercises were reviewed and Erich was able to demonstrate them prior to the end of the session.  Erich demonstrated good  understanding of the education provided. See EMR under Patient Instructions for exercises provided during therapy sessions.    ASSESSMENT     Erich is a 51 y.o. male referred to outpatient Physical Therapy with a medical diagnosis of Pain in left foot, contracture, and plantar fascial fibromatosis. Patient presents with chief complaint of L knee weakness following ACL repair 2016. No reports of instability and negative Lachmans. Mild quad atrophy VMO area noted and L hip weakness compared to R. Global joint stiffness, decreased ankle DF, and decreased LE " flexibility. Pt to benefit from skilled PT for injury prevention and return to full participation recreational activities including soccer and skiing.     Patient prognosis is Good.   Patientt will benefit from skilled outpatient Physical Therapy to address the deficits stated above and in the chart below, provide patient /family education, and to maximize patientt's level of independence.     Plan of care discussed with patient: Yes  Patient's spiritual, cultural and educational needs considered and patient is agreeable to the plan of care and goals as stated below:     Anticipated Barriers for therapy: none    Medical Necessity is demonstrated by the following  History  Co-morbidities and personal factors that may impact the plan of care Co-morbidities:   Prior knee surgery    Personal Factors:   no deficits     moderate   Examination  Body Structures and Functions, activity limitations and participation restrictions that may impact the plan of care Body Regions:   lower extremities    Body Systems:    ROM  strength  balance  gait  motor control    Participation Restrictions:   Running, cutting, prolonged weight bearing    Activity limitations:   Learning and applying knowledge  no deficits    General Tasks and Commands  no deficits    Communication  no deficits    Mobility  no deficits    Self care  no deficits    Domestic Life  no deficits    Interactions/Relationships  no deficits    Life Areas  no deficits    Community and Social Life  recreation and leisure         high   Clinical Presentation stable and uncomplicated low   Decision Making/ Complexity Score: low     Goals:  Short Term Goals (4 Weeks):   1. Patient to have improved B dorsiflexion PROM by 5 degrees or greater for ease with ADL's   2. Patient to have improved single limb balance as noted by 10 sec or greater ea LE for improved gait stability   3. Patient will increased BLE strength to 5/5 or greater for independence with ADL's such as  squatting  Long Term Goals (8 Weeks):   1. Patient to have decreased subjective report of disability as noted by a score of <20% or less on the FOTO ankle/foot questionnaire   2. Patient to be independent with home exercise program for improved self management of condition  3. Patient to have less than 2 cm difference on anterior reach Y balance for decreased risk of LE injury     PLAN   Plan of care Certification: 2/2/2022 to 4/31/2022.    Outpatient Physical Therapy 2 times weekly for 8 weeks to include the following interventions: Gait Training, Manual Therapy, Moist Heat/ Ice and Neuromuscular Re-ed.     Lauren Page, PT

## 2022-02-02 NOTE — PROGRESS NOTES
Subjective:      Patient ID: Erich Chery is a 51 y.o. male.    Chief Complaint: Foot Pain ( L foot worse than R)    Sharp deep pain in the bottom of the left heel and midfoot left indicates the 4th and 5th TMT J is with the index finger.  Rapid onset during a game as soccer with gradual increasing in decreasing in the week or so since.  Aggravated with prolonged standing and weight-bearing.  Locks cam stretches inserts have helped well in the past.  Denies significant trauma and surgery left foot.    Review of Systems   Constitutional: Negative for chills, diaphoresis, fever, malaise/fatigue and night sweats.   Cardiovascular: Negative for claudication, cyanosis, leg swelling and syncope.   Skin: Negative for color change, dry skin, nail changes, rash, suspicious lesions and unusual hair distribution.   Musculoskeletal: Negative for falls, joint pain, joint swelling, muscle cramps, muscle weakness and stiffness.   Gastrointestinal: Negative for constipation, diarrhea, nausea and vomiting.   Neurological: Negative for brief paralysis, disturbances in coordination, focal weakness, numbness, paresthesias, sensory change and tremors.           Objective:      Physical Exam  Constitutional:       General: He is not in acute distress.     Appearance: He is well-developed. He is not diaphoretic.   Cardiovascular:      Pulses:           Popliteal pulses are 2+ on the right side and 2+ on the left side.        Dorsalis pedis pulses are 2+ on the right side and 2+ on the left side.        Posterior tibial pulses are 2+ on the right side and 2+ on the left side.      Comments: Capillary refill 3 seconds all toes/distal feet, all toes/both feet warm to touch.      Negative lymphadenopathy bilateral popliteal fossa and tarsal tunnel.      Negavie lower extremity edema bilateral.    Musculoskeletal:      Right ankle: No swelling, deformity, ecchymosis or lacerations. Normal range of motion. Normal pulse.      Right  Achilles Tendon: Normal. No defects. Almonte's test negative.      Comments: Sharp deep pain to palpation inferior heel left at medial calcaneal tubercle without ecchymosis, erythema, edema, or cardinal signs infection, and no signs of trauma.    Sharp pain to palpation plantar MTPJ 3 4 left area without loss of function signs of acute trauma or chapis deformity.    Ankle dorsiflexion decreased at <10 degrees bilateral with moderate increase with knee flexion bilateral.      Otherwise, Normal angle, base, station of gait. All ten toes without clubbing, cyanosis, or signs of ischemia.  No pain to palpation bilateral lower extremities.  Range of motion, stability, muscle strength, and muscle tone normal bilateral feet and legs.    Lymphadenopathy:      Lower Body: No right inguinal adenopathy. No left inguinal adenopathy.      Comments: Negative lymphadenopathy bilateral popliteal fossa and tarsal tunnel.    Negative lymphangitic streaking bilateral feet/ankles/legs.   Skin:     General: Skin is warm and dry.      Capillary Refill: Capillary refill takes 2 to 3 seconds.      Coloration: Skin is not pale.      Findings: No abrasion, bruising, burn, ecchymosis, erythema, laceration, lesion or rash.      Nails: There is no clubbing.      Comments: Skin is normal age and health appropriate color, turgor, texture, and temperature bilateral lower extremities without ulceration, hyperpigmentation, discoloration, masses nodules or cords palpated.  No ecchymosis, erythema, edema, or cardinal signs of infection bilateral lower extremities.     Neurological:      Mental Status: He is alert and oriented to person, place, and time.      Sensory: No sensory deficit.      Motor: No tremor, atrophy or abnormal muscle tone.      Gait: Gait normal.      Deep Tendon Reflexes:      Reflex Scores:       Patellar reflexes are 2+ on the right side and 2+ on the left side.       Achilles reflexes are 2+ on the right side and 2+ on the left  side.     Comments: Negative tinel sign to percussion sural, superficial peroneal, deep peroneal, saphenous, and posterior tibial nerves right and left ankles and feet.     Psychiatric:         Behavior: Behavior is cooperative.               Assessment:       Encounter Diagnoses   Name Primary?    Plantar fasciitis Yes    Equinus contracture of ankle     Foot pain, left          Plan:       Erich was seen today for foot pain.    Diagnoses and all orders for this visit:    Plantar fasciitis    Equinus contracture of ankle    Foot pain, left      I counseled the patient on his conditions, their implications and medical management.        Patient will stretch the tendo achilles complex three times daily as demonstrated in the office.  Literature was dispensed illustrating proper stretching technique.    Patient will obtain over the counter arch supports and wear them in shoes whenever possible.  Athletic shoes intended for walking or running are usually best.    The patient was advised that NSAID-type medications have two very important potential side effects: gastrointestinal irritation including hemorrhage and renal injuries. He was asked to take the medication with food and to stop if he experiences any GI upset. I asked him to call for vomiting, abdominal pain or black/bloody stools. The patient expresses understanding of these issues and questions were answered.    Discussed conservative treatment with shoes of adequate dimensions, material, and style to alleviate symptoms and delay or prevent surgical intervention.    Rx PT, meloxicam    Declines night splint, fracture boot, injection, custom orthotics.        No follow-ups on file.

## 2022-02-04 PROBLEM — R29.898 WEAKNESS OF LEFT LOWER EXTREMITY: Status: ACTIVE | Noted: 2022-02-04

## 2022-02-08 ENCOUNTER — CLINICAL SUPPORT (OUTPATIENT)
Dept: REHABILITATION | Facility: OTHER | Age: 52
End: 2022-02-08
Payer: COMMERCIAL

## 2022-02-08 DIAGNOSIS — R29.898 WEAKNESS OF LEFT LOWER EXTREMITY: ICD-10-CM

## 2022-02-08 DIAGNOSIS — M79.672 LEFT FOOT PAIN: ICD-10-CM

## 2022-02-08 PROCEDURE — 97110 THERAPEUTIC EXERCISES: CPT | Mod: PN | Performed by: PHYSICAL THERAPIST

## 2022-02-08 PROCEDURE — 97112 NEUROMUSCULAR REEDUCATION: CPT | Mod: PN | Performed by: PHYSICAL THERAPIST

## 2022-02-08 NOTE — PROGRESS NOTES
"[facility]   Physical Therapy Treatment Note     Name: Erich Chery  Clinic Number: [id number]    Therapy Diagnosis:   Encounter Diagnoses   Name Primary?    Weakness of left lower extremity     Left foot pain      Physician: Nicolas Castanon DPM    Visit Date: 2/8/2022    Physician Orders: PT Eval and Treat   modalities prn  Medical Diagnosis from Referral:   M72.2 (ICD-10-CM) - Plantar fascial fibromatosis   M24.573 (ICD-10-CM) - Contracture, unspecified ankle   M79.672 (ICD-10-CM) - Pain in left foot      Evaluation Date: 2/2/2022  Authorization Period Expiration: 2/2/2023  Plan of Care Expiration: 4/29/2022  Progress Note Due: 3/2/2022  Visit # / Visits authorized: 1/ 1   FOTO: 1/5     Precautions: Standard     PTA Visit #: 0/5     Time In: 11:38 am  Time Out: 12:30 pm  Total Billable Time: 42 minutes    SUBJECTIVE     Pt reports: has not playing soccer since initial eval.  He was compliant with home exercise program.  Response to previous treatment: no adverse effects   Functional change: none    Pain: 0/10  Location: left knee and foot      OBJECTIVE     Objective Measures updated at progress report unless specified.     Treatment     Erich received the treatments listed below:      therapeutic exercises to develop strength, endurance, ROM, flexibility and core stabilization for 25 minutes including:    Dynamic warm up (knee hugs, quad pulls, solider kicks, lunges with twist) 2 laps each  Half kneeling soleus with super band/ self mob str 10" x 10  IT band str with strap 30"- not today  Squats with B ER BTB x 20- not today  Lateral agility Blue sports cord 90 sec  Step ups 6" 3 x 10  Lateral step ups 4" 3 x 10- exacerbation of anterior knee pain    neuromuscular re-education activities to improve: Balance and Proprioception for 15 minutes. The following activities were included:  Wobble board M/L x 2 min  BOSU (black) DARRIUS with pink FMT AP/ ML/ PA 30 sec ea  ITIS with yellow " med bal forw/ lat SLS x 20 ea  SL RDL with cone tough (yellow med ball toss) 3 x 5      cold pack for 10 minutes to L knee.        Patient Education and Home Exercises     Home Exercises Provided and Patient Education Provided     Education provided:   - continue HEP    Written Home Exercises Provided: yes. Exercises were reviewed and Erich was able to demonstrate them prior to the end of the session.  Erich demonstrated good  understanding of the education provided. See EMR under Patient Instructions for exercises provided during therapy sessions    ASSESSMENT     Exacerbation of L knee pain with single limb squat descent and modified step height per tolerance and VC's for level pelvis/ posterior weight shift.     Erich Is progressing well towards his goals.   Pt prognosis is Good.     Pt will continue to benefit from skilled outpatient physical therapy to address the deficits listed in the problem list box on initial evaluation, provide pt/family education and to maximize pt's level of independence in the home and community environment.     Pt's spiritual, cultural and educational needs considered and pt agreeable to plan of care and goals.     Anticipated barriers to physical therapy: none    Goals:     Short Term Goals (4 Weeks):   1. Patient to have improved B dorsiflexion PROM by 5 degrees or greater for ease with ADL's  (in progress, not met)  2. Patient to have improved single limb balance as noted by 10 sec or greater ea LE for improved gait stability  (in progress, not met)  3. Patient will increased BLE strength to 5/5 or greater for independence with ADL's such as squatting  (in progress, not met)  Long Term Goals (8 Weeks):   1. Patient to have decreased subjective report of disability as noted by a score of <20% or less on the FOTO ankle/foot questionnaire  (in progress, not met)  2. Patient to be independent with home exercise program for improved self management of condition  (in progress, not met)  3.  Patient to have less than 2 cm difference on anterior reach Y balance for decreased risk of LE injury  (in progress, not met)    PLAN     Continue per POC with emphasis on agility and single stabilization/ strengthening     Lauren Page, PT

## 2022-02-11 ENCOUNTER — CLINICAL SUPPORT (OUTPATIENT)
Dept: REHABILITATION | Facility: OTHER | Age: 52
End: 2022-02-11
Payer: COMMERCIAL

## 2022-02-11 DIAGNOSIS — R29.898 WEAKNESS OF LEFT LOWER EXTREMITY: ICD-10-CM

## 2022-02-11 DIAGNOSIS — M79.672 LEFT FOOT PAIN: ICD-10-CM

## 2022-02-11 PROCEDURE — 97110 THERAPEUTIC EXERCISES: CPT | Mod: PN,97 | Performed by: PHYSICAL THERAPIST

## 2022-02-11 PROCEDURE — 97112 NEUROMUSCULAR REEDUCATION: CPT | Mod: PN | Performed by: PHYSICAL THERAPIST

## 2022-02-11 NOTE — PROGRESS NOTES
"[facility]   Physical Therapy Treatment Note     Name: Erich Chery  Clinic Number: [id number]    Therapy Diagnosis:   Encounter Diagnoses   Name Primary?    Weakness of left lower extremity     Left foot pain      Physician: Nicolas Castanon DPM    Visit Date: 2/11/2022    Physician Orders: PT Eval and Treat   modalities prn  Medical Diagnosis from Referral:   M72.2 (ICD-10-CM) - Plantar fascial fibromatosis   M24.573 (ICD-10-CM) - Contracture, unspecified ankle   M79.672 (ICD-10-CM) - Pain in left foot      Evaluation Date: 2/2/2022  Authorization Period Expiration: 2/2/2023  Plan of Care Expiration: 4/29/2022  Progress Note Due: 3/2/2022  Visit # / Visits authorized: 1/ 1   FOTO: 1/5     Precautions: Standard     PTA Visit #: 0/5     Time In: 11:30 am  Time Out: 12:15 pm  Total Billable Time: 40 minutes    SUBJECTIVE     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 Reports hips are feeling tight today  He was compliant with home exercise program.  Response to previous treatment: no adverse effects   Functional change: none    Pain: 0/10  Location: left knee and foot      OBJECTIVE     Objective Measures updated at progress report unless specified.     Treatment     Erich received the treatments listed below:      therapeutic exercises to develop strength, endurance, ROM, flexibility and core stabilization for 25 minutes including:    Dynamic warm up (knee hugs, quad pulls, walking RDL's, lunges with twist) 2 laps each  Half kneeling soleus with super band/ self mob str 10" x 10- not today  IT band str with strap 30"  Squats with B ER BTB x 20- not today  Single knee bends blue sport cord 15 x 3  Lateral agility Blue sports cord 90 sec  Forw/ back jogging with blue sport cord x 2 min ea  DL To SL plyo jumps on shuttle 30" x 2 1 black cord  Step ups 6" 3 x 10  Lateral step ups 6" 3 x 10    neuromuscular re-education activities to improve: Balance and " Proprioception for 15 minutes. The following activities were included:  Wobble board M/L x 2 min  BOSU (black) DARRIUS with pink FMT AP/ ML/ PA 30 sec ea  ITIS with yellow med bal forw/ lat SLS x 20 ea  SL RDL with cone tough (yellow med ball toss) 3 x 5    cold pack for 00 minutes to L knee.        Patient Education and Home Exercises     Home Exercises Provided and Patient Education Provided     Education provided:   - continue HEP    Written Home Exercises Provided: yes. Exercises were reviewed and Erich was able to demonstrate them prior to the end of the session.  Erich demonstrated good  understanding of the education provided. See EMR under Patient Instructions for exercises provided during therapy sessions    ASSESSMENT     Improved tolerance to single knee bends and step downs without exacerbation of anterior knee pain and increased knee flexion achieved.     Erich Is progressing well towards his goals.   Pt prognosis is Good.     Pt will continue to benefit from skilled outpatient physical therapy to address the deficits listed in the problem list box on initial evaluation, provide pt/family education and to maximize pt's level of independence in the home and community environment.     Pt's spiritual, cultural and educational needs considered and pt agreeable to plan of care and goals.     Anticipated barriers to physical therapy: none    Goals:     Short Term Goals (4 Weeks):   1. Patient to have improved B dorsiflexion PROM by 5 degrees or greater for ease with ADL's  (in progress, not met)  2. Patient to have improved single limb balance as noted by 10 sec or greater ea LE for improved gait stability  (in progress, not met)  3. Patient will increased BLE strength to 5/5 or greater for independence with ADL's such as squatting  (in progress, not met)  Long Term Goals (8 Weeks):   1. Patient to have decreased subjective report of disability as noted by a score of <20% or less on the FOTO ankle/foot  questionnaire  (in progress, not met)  2. Patient to be independent with home exercise program for improved self management of condition  (in progress, not met)  3. Patient to have less than 2 cm difference on anterior reach Y balance for decreased risk of LE injury  (in progress, not met)    PLAN     Continue per POC with emphasis on agility and single stabilization/ strengthening     Lauren Page, PT

## 2022-02-14 ENCOUNTER — CLINICAL SUPPORT (OUTPATIENT)
Dept: REHABILITATION | Facility: OTHER | Age: 52
End: 2022-02-14
Payer: COMMERCIAL

## 2022-02-14 DIAGNOSIS — M79.672 LEFT FOOT PAIN: ICD-10-CM

## 2022-02-14 DIAGNOSIS — R29.898 WEAKNESS OF LEFT LOWER EXTREMITY: ICD-10-CM

## 2022-02-14 PROCEDURE — 97112 NEUROMUSCULAR REEDUCATION: CPT | Mod: PN | Performed by: PHYSICAL THERAPIST

## 2022-02-14 PROCEDURE — 97110 THERAPEUTIC EXERCISES: CPT | Mod: PN,97 | Performed by: PHYSICAL THERAPIST

## 2022-02-14 NOTE — PROGRESS NOTES
"[facility]   Physical Therapy Treatment Note     Name: Erich Chery  Clinic Number: [id number]    Therapy Diagnosis:   Encounter Diagnoses   Name Primary?    Weakness of left lower extremity     Left foot pain      Physician: Nicolas Castanon DPM    Visit Date: 2/14/2022    Physician Orders: PT Eval and Treat   modalities prn  Medical Diagnosis from Referral:   M72.2 (ICD-10-CM) - Plantar fascial fibromatosis   M24.573 (ICD-10-CM) - Contracture, unspecified ankle   M79.672 (ICD-10-CM) - Pain in left foot      Evaluation Date: 2/2/2022  Authorization Period Expiration: 2/2/2023  Plan of Care Expiration: 4/29/2022  Progress Note Due: 3/2/2022  Visit # / Visits authorized: 1/ 1   FOTO: 1/5     Precautions: Standard     PTA Visit #: 0/5     Time In: 1:10 pm  Time Out: 2:10 pm  Total Billable Time: 40 minutes    SUBJECTIVE     Pt reports: no issues following last visit. Finds his L hip weaker than the R and compensating when he walks/ runs.   He was compliant with home exercise program.  Response to previous treatment: no adverse effects   Functional change: none    Pain: 0/10  Location: left knee and foot      OBJECTIVE     Objective Measures updated at progress report unless specified.     Treatment     Erich received the treatments listed below:      therapeutic exercises to develop strength, endurance, ROM, flexibility and core stabilization for 25 minutes including:    Bike x 5 min following BFR  Half kneeling soleus with super band/ self mob str 10" x 10- not today  IT band str with strap 30"  Piriformis stretch 30" x 3  RDL with 20# row 2 x 15    Single knee bends blue sport cord 15 x 3  Lateral agility Blue sports cord 90 sec    Not today:  Dynamic warm up (knee hugs, quad pulls, walking RDL's, lunges with twist) 2 laps each  Forw/ back jogging with blue sport cord x 2 min ea  DL To SL plyo jumps on shuttle 30" x 2 1 black cord  Step ups 6" 3 x 10  Lateral step ups 6" 3 x " 10    Performed blood flow restriction with 161 mmHg (80% of LOP) on L extremity with Christine Unit and skin protectant sleeve. Patient screened for any precautions or contraindications prior to its use and continuously monitored for any adverse events. Patient given 30 sec rest breaks between sets and 1 min rest break between exercise    SLR 30/15/15/15  LAQ 2# 30/15/15- stopped      neuromuscular re-education activities to improve: Balance and Proprioception for 15 minutes. The following activities were included:  Squats on BOSU 3 x 15 (ball toss and pertubation's on last rep)  BOSU (black) DARRIUS with pink FMT AP/ ML/ PA 30 sec ea- not today  ITIS with yellow med bal forw/ lat SLS x 20 ea  SL RDL with cone tough (yellow med ball toss) 3 x 5- not today  Step ups forw/ lat on BOSU (blue side) x 20 ea  1/2 kneeling chops in tandem on airex pad 20# free motion 2 x 15 ea    cold pack for 00 minutes to L knee.        Patient Education and Home Exercises     Home Exercises Provided and Patient Education Provided     Education provided:   - continue HEP    Written Home Exercises Provided: yes. Exercises were reviewed and Erich was able to demonstrate them prior to the end of the session.  Erich demonstrated good  understanding of the education provided. See EMR under Patient Instructions for exercises provided during therapy sessions    ASSESSMENT     Initiated blood flow restriction this visit for LE atrophy following ACL repair and immobilization in 2016. Unable to tolerate at 80% LOP. Decreased to 70% and repositioned cuff with no improvement. Deflated cuff and ceased exercise with BFR. Good tolerance to SL strengthening and balance activities following.     Erich Is progressing well towards his goals.   Pt prognosis is Good.     Pt will continue to benefit from skilled outpatient physical therapy to address the deficits listed in the problem list box on initial evaluation, provide pt/family education and to maximize pt's  level of independence in the home and community environment.     Pt's spiritual, cultural and educational needs considered and pt agreeable to plan of care and goals.     Anticipated barriers to physical therapy: none    Goals:     Short Term Goals (4 Weeks):   1. Patient to have improved B dorsiflexion PROM by 5 degrees or greater for ease with ADL's  (in progress, not met)  2. Patient to have improved single limb balance as noted by 10 sec or greater ea LE for improved gait stability  (in progress, not met)  3. Patient will increased BLE strength to 5/5 or greater for independence with ADL's such as squatting  (in progress, not met)  Long Term Goals (8 Weeks):   1. Patient to have decreased subjective report of disability as noted by a score of <20% or less on the FOTO ankle/foot questionnaire  (in progress, not met)  2. Patient to be independent with home exercise program for improved self management of condition  (in progress, not met)  3. Patient to have less than 2 cm difference on anterior reach Y balance for decreased risk of LE injury  (in progress, not met)    PLAN     Continue per POC with emphasis on agility and single stabilization/ strengthening     Lauren Page, PT

## 2022-02-18 ENCOUNTER — CLINICAL SUPPORT (OUTPATIENT)
Dept: REHABILITATION | Facility: OTHER | Age: 52
End: 2022-02-18
Payer: COMMERCIAL

## 2022-02-18 DIAGNOSIS — M79.672 LEFT FOOT PAIN: ICD-10-CM

## 2022-02-18 DIAGNOSIS — R29.898 WEAKNESS OF LEFT LOWER EXTREMITY: ICD-10-CM

## 2022-02-18 PROCEDURE — 97112 NEUROMUSCULAR REEDUCATION: CPT | Mod: PN | Performed by: PHYSICAL THERAPIST

## 2022-02-18 PROCEDURE — 97110 THERAPEUTIC EXERCISES: CPT | Mod: PN,97 | Performed by: PHYSICAL THERAPIST

## 2022-02-18 NOTE — PROGRESS NOTES
"[facility]   Physical Therapy Treatment Note     Name: Erich Chery  Clinic Number: [id number]    Therapy Diagnosis:   Encounter Diagnoses   Name Primary?    Weakness of left lower extremity     Left foot pain      Physician: Nicoals Castanon DPM    Visit Date: 2/18/2022    Physician Orders: PT Eval and Treat   modalities prn  Medical Diagnosis from Referral:   M72.2 (ICD-10-CM) - Plantar fascial fibromatosis   M24.573 (ICD-10-CM) - Contracture, unspecified ankle   M79.672 (ICD-10-CM) - Pain in left foot      Evaluation Date: 2/2/2022  Authorization Period Expiration: 2/2/2023  Plan of Care Expiration: 4/29/2022  Progress Note Due: 3/2/2022  Visit # / Visits authorized: 1/ 1   FOTO: 1/5     Precautions: Standard     PTA Visit #: 0/5     Time In: 10:05 am  Time Out: 10:45 am  Total Billable Time: 40 minutes    SUBJECTIVE     Pt reports: Ski trip next weekend. Feels stronger since starting  He was compliant with home exercise program.  Response to previous treatment: soreness in the hips  Functional change: none    Pain: 0/10  Location: left knee and foot      OBJECTIVE     Objective Measures updated at progress report unless specified.     Treatment     Erich received the treatments listed below:      therapeutic exercises to develop strength, endurance, ROM, flexibility and core stabilization for 25 minutes including:    Dynamic warm up (knee hugs, quad pulls, walking RDL's, lunges with twist) 2 laps each  Foam roll piriformis x 2 min  Forw/ back jogging with black sport cord x 2 min ea  Lateral agility Black sports cord 90 sec  SL plyo jumps on shuttle 30" x 2 2 black cords    Not today:  Single knee bends blue sport cord 15 x 3  RDL with 20# row 2 x 15  Half kneeling soleus with super band/ self mob str 10" x 10- not today  IT band str with strap 30"  Piriformis stretch 30" x 3  Step ups 6" 3 x 10  Lateral step ups 6" 3 x 10      neuromuscular re-education activities to improve: " Balance and Proprioception for 15 minutes. The following activities were included:  Squats on BOSU 3 x 15 (ball toss on last rep)  BOSU (black) DARRIUS with pink FMT AP/ ML/ PA 30 sec ea- not today  ITIS with yellow med bal forw/ lat SLS x 20 ea (decreased to red ball 2/2 R shoulder pain)  SL RDL with cone tough (yellow med ball toss) 3 x 5- not today  Y balance 5 x 3 ea    cold pack for 00 minutes to L knee.        Patient Education and Home Exercises     Home Exercises Provided and Patient Education Provided     Education provided:   - continue HEP    Written Home Exercises Provided: yes. Exercises were reviewed and Erich was able to demonstrate them prior to the end of the session.  Erich demonstrated good  understanding of the education provided. See EMR under Patient Instructions for exercises provided during therapy sessions    ASSESSMENT     Pt demonstrating improvements in strength as noted by increase in sports cord and shuttle resistance this visit.     Erich Is progressing well towards his goals.   Pt prognosis is Good.     Pt will continue to benefit from skilled outpatient physical therapy to address the deficits listed in the problem list box on initial evaluation, provide pt/family education and to maximize pt's level of independence in the home and community environment.     Pt's spiritual, cultural and educational needs considered and pt agreeable to plan of care and goals.     Anticipated barriers to physical therapy: none    Goals:     Short Term Goals (4 Weeks):   1. Patient to have improved B dorsiflexion PROM by 5 degrees or greater for ease with ADL's  (in progress, not met)  2. Patient to have improved single limb balance as noted by 10 sec or greater ea LE for improved gait stability  (in progress, not met)  3. Patient will increased BLE strength to 5/5 or greater for independence with ADL's such as squatting  (in progress, not met)  Long Term Goals (8 Weeks):   1. Patient to have decreased  subjective report of disability as noted by a score of <20% or less on the FOTO ankle/foot questionnaire  (in progress, not met)  2. Patient to be independent with home exercise program for improved self management of condition  (in progress, not met)  3. Patient to have less than 2 cm difference on anterior reach Y balance for decreased risk of LE injury  (in progress, not met)    PLAN     Continue per POC with emphasis on agility and single stabilization/ strengthening     Lauren Page, PT

## 2022-02-22 ENCOUNTER — CLINICAL SUPPORT (OUTPATIENT)
Dept: REHABILITATION | Facility: OTHER | Age: 52
End: 2022-02-22
Payer: COMMERCIAL

## 2022-02-22 DIAGNOSIS — Z20.822 ENCOUNTER FOR LABORATORY TESTING FOR COVID-19 VIRUS: ICD-10-CM

## 2022-02-22 DIAGNOSIS — M79.672 LEFT FOOT PAIN: Primary | ICD-10-CM

## 2022-02-22 DIAGNOSIS — R29.898 WEAKNESS OF LEFT LOWER EXTREMITY: ICD-10-CM

## 2022-02-22 PROCEDURE — 97110 THERAPEUTIC EXERCISES: CPT | Mod: PN,97 | Performed by: PHYSICAL THERAPIST

## 2022-02-22 PROCEDURE — 97112 NEUROMUSCULAR REEDUCATION: CPT | Mod: PN | Performed by: PHYSICAL THERAPIST

## 2022-02-22 NOTE — PROGRESS NOTES
"[facility]   Physical Therapy Treatment Note     Name: Erich Chery  Clinic Number: [id number]    Therapy Diagnosis:   Encounter Diagnoses   Name Primary?    Left foot pain Yes    Weakness of left lower extremity      Physician: Nicolas Castanon DPM    Visit Date: 2/22/2022    Physician Orders: PT Eval and Treat   modalities prn  Medical Diagnosis from Referral:   M72.2 (ICD-10-CM) - Plantar fascial fibromatosis   M24.573 (ICD-10-CM) - Contracture, unspecified ankle   M79.672 (ICD-10-CM) - Pain in left foot      Evaluation Date: 2/2/2022  Authorization Period Expiration: 2/2/2023  Plan of Care Expiration: 4/29/2022  Progress Note Due: 3/2/2022  Visit # / Visits authorized: 1/ 1   FOTO: 1/5     Precautions: Standard     PTA Visit #: 0/5     Time In: 10:50 am  Time Out: 11:35 am  Total Billable Time: 40 minutes    SUBJECTIVE     Pt reports: One more visit prior to vacation to [place]. Plans on ice skating and skiing.   He was compliant with home exercise program.  Response to previous treatment: no adverse effects or pain  Functional change: none    Pain: 0/10  Location: left knee and foot      OBJECTIVE     Objective Measures updated at progress report unless specified.     Treatment     Erich received the treatments listed below:      therapeutic exercises to develop strength, endurance, ROM, flexibility and core stabilization for 25 minutes including:    Dynamic warm up (knee hugs, quad pulls , lunges with twist) 2 laps each  Monster walks forw/ back 40' x 2 ea  RDL with 20# row 2 x 15  Lateral agility orange FMT 90 sec ea  Single knee bends blue sport cord 15 x 3  Step ups 6" 3 x 10 (with pink FMT TKE)    Not today:  SL plyo jumps on shuttle 30" x 2 2 black cord  Forw/ back jogging with black sport cord x 2 min ea  Foam roll piriformis x 2 min  Half kneeling soleus with super band/ self mob str 10" x 10- not today  IT band str with strap 30"  Piriformis stretch 30" x 3  Lateral " "step ups 6" 3 x 10      neuromuscular re-education activities to improve: Balance and Proprioception for 15 minutes. The following activities were included:  Squats on BOSU 3 x 15 (ball toss on last rep)- not today  dynadisc SLS (black) DARRIUS with pink FMT AP/ ML/ PA 30 sec ea  ITIS with yellow med bal forw/ lat SLS x 20 ea - not today  Y balance 2 x 10 ea  Lateral shuffle with GTB and ball toss 40' x 2    cold pack for 00 minutes to L knee.        Patient Education and Home Exercises     Home Exercises Provided and Patient Education Provided     Education provided:   - continue HEP    Written Home Exercises Provided: yes. Exercises were reviewed and Erich was able to demonstrate them prior to the end of the session.  Erich demonstrated good  understanding of the education provided. See EMR under Patient Instructions for exercises provided during therapy sessions    ASSESSMENT     Pt tolerated treatment well with minimal anterior knee pain. Demonstrating decreased eccentric control L with single knee bends and anterior reach on Y balance compared to R.     Erich Is progressing well towards his goals.   Pt prognosis is Good.     Pt will continue to benefit from skilled outpatient physical therapy to address the deficits listed in the problem list box on initial evaluation, provide pt/family education and to maximize pt's level of independence in the home and community environment.     Pt's spiritual, cultural and educational needs considered and pt agreeable to plan of care and goals.     Anticipated barriers to physical therapy: none    Goals:     Short Term Goals (4 Weeks):   1. Patient to have improved B dorsiflexion PROM by 5 degrees or greater for ease with ADL's  (in progress, not met)  2. Patient to have improved single limb balance as noted by 10 sec or greater ea LE for improved gait stability  (in progress, not met)  3. Patient will increased BLE strength to 5/5 or greater for independence with ADL's such as " squatting  (in progress, not met)  Long Term Goals (8 Weeks):   1. Patient to have decreased subjective report of disability as noted by a score of <20% or less on the FOTO ankle/foot questionnaire  (in progress, not met)  2. Patient to be independent with home exercise program for improved self management of condition  (in progress, not met)  3. Patient to have less than 2 cm difference on anterior reach Y balance for decreased risk of LE injury  (in progress, not met)    PLAN     Continue per POC with emphasis on agility and single stabilization/ strengthening     Lauren Page, PT

## 2022-02-25 ENCOUNTER — LAB VISIT (OUTPATIENT)
Dept: LAB | Facility: HOSPITAL | Age: 52
End: 2022-02-25
Payer: COMMERCIAL

## 2022-02-25 DIAGNOSIS — Z20.822 ENCOUNTER FOR LABORATORY TESTING FOR COVID-19 VIRUS: ICD-10-CM

## 2022-02-25 LAB — SARS-COV-2 RNA RESP QL NAA+PROBE: NOT DETECTED

## 2022-02-25 PROCEDURE — U0003 INFECTIOUS AGENT DETECTION BY NUCLEIC ACID (DNA OR RNA); SEVERE ACUTE RESPIRATORY SYNDROME CORONAVIRUS 2 (SARS-COV-2) (CORONAVIRUS DISEASE [COVID-19]), AMPLIFIED PROBE TECHNIQUE, MAKING USE OF HIGH THROUGHPUT TECHNOLOGIES AS DESCRIBED BY CMS-2020-01-R: HCPCS | Performed by: INTERNAL MEDICINE

## 2022-02-25 PROCEDURE — U0005 INFEC AGEN DETEC AMPLI PROBE: HCPCS | Performed by: INTERNAL MEDICINE

## 2022-03-16 ENCOUNTER — PATIENT MESSAGE (OUTPATIENT)
Dept: ADMINISTRATIVE | Facility: HOSPITAL | Age: 52
End: 2022-03-16
Payer: COMMERCIAL

## 2022-03-23 DIAGNOSIS — N52.9 ERECTILE DYSFUNCTION, UNSPECIFIED ERECTILE DYSFUNCTION TYPE: ICD-10-CM

## 2022-03-23 RX ORDER — SILDENAFIL 100 MG/1
TABLET, FILM COATED ORAL
Qty: 15 TABLET | Refills: 11 | Status: SHIPPED | OUTPATIENT
Start: 2022-03-23 | End: 2022-12-28

## 2022-04-05 ENCOUNTER — OFFICE VISIT (OUTPATIENT)
Dept: INTERNAL MEDICINE | Facility: CLINIC | Age: 52
End: 2022-04-05
Payer: COMMERCIAL

## 2022-04-05 VITALS
BODY MASS INDEX: 31.7 KG/M2 | HEART RATE: 77 BPM | SYSTOLIC BLOOD PRESSURE: 126 MMHG | HEIGHT: 73 IN | TEMPERATURE: 98 F | DIASTOLIC BLOOD PRESSURE: 86 MMHG | OXYGEN SATURATION: 98 % | WEIGHT: 239.19 LBS

## 2022-04-05 DIAGNOSIS — E78.2 MIXED HYPERLIPIDEMIA: ICD-10-CM

## 2022-04-05 DIAGNOSIS — E66.09 CLASS 1 OBESITY DUE TO EXCESS CALORIES WITH SERIOUS COMORBIDITY AND BODY MASS INDEX (BMI) OF 31.0 TO 31.9 IN ADULT: ICD-10-CM

## 2022-04-05 DIAGNOSIS — Z00.00 ANNUAL PHYSICAL EXAM: Primary | ICD-10-CM

## 2022-04-05 DIAGNOSIS — Z12.5 SCREENING PSA (PROSTATE SPECIFIC ANTIGEN): ICD-10-CM

## 2022-04-05 DIAGNOSIS — F17.290 CIGAR SMOKER: ICD-10-CM

## 2022-04-05 DIAGNOSIS — G47.33 OSA (OBSTRUCTIVE SLEEP APNEA): ICD-10-CM

## 2022-04-05 PROCEDURE — 99999 PR PBB SHADOW E&M-EST. PATIENT-LVL IV: CPT | Mod: PBBFAC,,, | Performed by: FAMILY MEDICINE

## 2022-04-05 PROCEDURE — 3079F PR MOST RECENT DIASTOLIC BLOOD PRESSURE 80-89 MM HG: ICD-10-PCS | Mod: CPTII,S$GLB,, | Performed by: FAMILY MEDICINE

## 2022-04-05 PROCEDURE — 1159F PR MEDICATION LIST DOCUMENTED IN MEDICAL RECORD: ICD-10-PCS | Mod: CPTII,S$GLB,, | Performed by: FAMILY MEDICINE

## 2022-04-05 PROCEDURE — 99999 PR PBB SHADOW E&M-EST. PATIENT-LVL IV: ICD-10-PCS | Mod: PBBFAC,,, | Performed by: FAMILY MEDICINE

## 2022-04-05 PROCEDURE — 3074F SYST BP LT 130 MM HG: CPT | Mod: CPTII,S$GLB,, | Performed by: FAMILY MEDICINE

## 2022-04-05 PROCEDURE — 1159F MED LIST DOCD IN RCRD: CPT | Mod: CPTII,S$GLB,, | Performed by: FAMILY MEDICINE

## 2022-04-05 PROCEDURE — 3079F DIAST BP 80-89 MM HG: CPT | Mod: CPTII,S$GLB,, | Performed by: FAMILY MEDICINE

## 2022-04-05 PROCEDURE — 99396 PR PREVENTIVE VISIT,EST,40-64: ICD-10-PCS | Mod: S$GLB,,, | Performed by: FAMILY MEDICINE

## 2022-04-05 PROCEDURE — 1160F PR REVIEW ALL MEDS BY PRESCRIBER/CLIN PHARMACIST DOCUMENTED: ICD-10-PCS | Mod: CPTII,S$GLB,, | Performed by: FAMILY MEDICINE

## 2022-04-05 PROCEDURE — 3008F PR BODY MASS INDEX (BMI) DOCUMENTED: ICD-10-PCS | Mod: CPTII,S$GLB,, | Performed by: FAMILY MEDICINE

## 2022-04-05 PROCEDURE — 99396 PREV VISIT EST AGE 40-64: CPT | Mod: S$GLB,,, | Performed by: FAMILY MEDICINE

## 2022-04-05 PROCEDURE — 3074F PR MOST RECENT SYSTOLIC BLOOD PRESSURE < 130 MM HG: ICD-10-PCS | Mod: CPTII,S$GLB,, | Performed by: FAMILY MEDICINE

## 2022-04-05 PROCEDURE — 1160F RVW MEDS BY RX/DR IN RCRD: CPT | Mod: CPTII,S$GLB,, | Performed by: FAMILY MEDICINE

## 2022-04-05 PROCEDURE — 3008F BODY MASS INDEX DOCD: CPT | Mod: CPTII,S$GLB,, | Performed by: FAMILY MEDICINE

## 2022-04-05 NOTE — PROGRESS NOTES
Subjective:      Patient ID: Erich Chery is a 51 y.o. male.    Chief Complaint: Annual Exam      HPI:  Erich Chery is a 51 year old male with hyperlipidemia, obesity, obstructive sleep apnea, and vitamin D deficiency who presents to clinic today for annual physical exam.    Endorses persistent plantar fasciitis; takes meloxicam as needed.  Doing home exercises.      HLD:  Most recent lipid panel 3/15/21 showed total cholesterol 220, triglycerides 233, HDL 34.  Previously prescribed atorvastatin but didn't take.   The 10-year ASCVD risk score (Sepidehrachel WRIGHT Jr., et al., 2013) is: 12.5%    Values used to calculate the score:      Age: 51 years      Sex: Male      Is Non- : No      Diabetic: No      Tobacco smoker: Yes      Systolic Blood Pressure: 126 mmHg      Is BP treated: No      HDL Cholesterol: 34 mg/dL      Total Cholesterol: 220 mg/dL    Obesity:  BMI 31.56.  Up 6 lbs since last visit with me 3/9/21.  Walks, works out twice per week, plays soccer once per week.      ARTUR:  Doesn't use any CPAP at night.  Symptoms have been fairly bad lately.      Tobacco use:  Smokes cigars once or twice monthly.  Denies cigarettes.      Health Care Maintenance:  Influenza vaccination:  10/21/21  Last tetanus booster:  3/2/18  Pneumovax:  Declined  Shingles vaccination:  Declined  COVID-19 vaccination:  J&J 3/31/21, Pfizer 2/7/22  Colon cancer screening:  Last colonoscopy 7/22/21; repeat 5 years      Past Medical History:   Diagnosis Date    ACL tear 3/9/2016    Asthma     Deviated septum     Genital herpes in men     Hyperlipidemia     Obesity     Tobacco use 11/28/2018       Past Surgical History:   Procedure Laterality Date    COLONOSCOPY N/A 7/22/2021    Procedure: COLONOSCOPY;  Surgeon: Leo Delatorre MD;  Location: [address];  Service: Endoscopy;  Laterality: N/A;  1st colonoscopy - screening  pt fully vaccinated-BB    ESOPHAGOGASTRODUODENOSCOPY N/A 7/22/2021    Procedure:  EGD (ESOPHAGOGASTRODUODENOSCOPY);  Surgeon: Leo Delatorre MD;  Location: [facility] ([address] [place] [address]);  Service: Endoscopy;  Laterality: N/A;  dysphagia to solid foods  pt fully vaccinated-BB    KNEE SURGERY         Family History   Problem Relation Age of Onset    Hyperlipidemia Father     Heart disease Father         CABG    Cancer Paternal Grandfather         lung       Social History     Socioeconomic History    Marital status: Single   Occupational History    Occupation: /Restaurant Owner     Comment: [employer]   Tobacco Use    Smoking status: Light Tobacco Smoker     Packs/day: 0.15     Years: 20.00     Pack years: 3.00     Types: Cigars     Last attempt to quit: 2019     Years since quittin.7    Smokeless tobacco: Never Used    Tobacco comment: states he smokes approximately 10 cigarettes per week   Substance and Sexual Activity    Alcohol use: Yes     Alcohol/week: 14.0 standard drinks     Types: 14 Shots of liquor per week    Drug use: Yes     Types: Marijuana    Sexual activity: Yes     Partners: Female     Comment: Does not use barrier protection; does not know if his partner  uses any contraception       Review of Systems   Constitutional: Negative for chills, fatigue and fever.   HENT: Negative for congestion, hearing loss, nosebleeds, rhinorrhea, sore throat and trouble swallowing.    Eyes: Negative for pain and visual disturbance.   Respiratory: Negative for cough, shortness of breath and wheezing.    Cardiovascular: Negative for chest pain and palpitations.   Gastrointestinal: Negative for abdominal distention, abdominal pain, constipation, diarrhea, nausea and vomiting.   Genitourinary: Negative for difficulty urinating, dysuria, frequency, hematuria and urgency.   Musculoskeletal: Positive for arthralgias. Negative for back pain and myalgias.   Skin: Negative for color change and rash.   Neurological: Negative for dizziness, syncope, speech difficulty, weakness,  "numbness and headaches.   Psychiatric/Behavioral: Negative for agitation, behavioral problems and confusion. The patient is not nervous/anxious.      Objective:     Vitals:    04/05/22 1020   BP: 126/86   BP Location: Left arm   Patient Position: Sitting   BP Method: Medium (Manual)   Pulse: 77   Temp: 98.3 °F (36.8 °C)   TempSrc: Oral   SpO2: 98%   Weight: 108.5 kg (239 lb 3.2 oz)   Height: 6' 1" (1.854 m)       Physical Exam  Vitals and nursing note reviewed.   Constitutional:       General: He is not in acute distress.     Appearance: He is well-developed. He is obese.   HENT:      Head: Normocephalic and atraumatic.      Right Ear: Hearing, tympanic membrane, ear canal and external ear normal.      Left Ear: Hearing, tympanic membrane, ear canal and external ear normal.      Nose: Nose normal. No rhinorrhea.   Eyes:      General: Lids are normal.         Right eye: No discharge.         Left eye: No discharge.      Conjunctiva/sclera: Conjunctivae normal.   Neck:      Trachea: Trachea normal. No tracheal deviation.   Cardiovascular:      Rate and Rhythm: Normal rate and regular rhythm.      Heart sounds:     No friction rub. No gallop.   Pulmonary:      Effort: Pulmonary effort is normal. No respiratory distress.      Breath sounds: Normal breath sounds. No wheezing or rales.   Abdominal:      General: Bowel sounds are normal. There is no distension.      Palpations: Abdomen is soft.      Tenderness: There is no abdominal tenderness. There is no guarding or rebound.   Musculoskeletal:      Cervical back: Neck supple.   Lymphadenopathy:      Cervical: No cervical adenopathy.   Skin:     General: Skin is warm and dry.      Findings: No rash.   Neurological:      Mental Status: He is alert.      Motor: No abnormal muscle tone.   Psychiatric:         Speech: Speech normal.         Behavior: Behavior normal. Behavior is cooperative.         Thought Content: Thought content normal.         Judgment: Judgment normal. "        Assessment:      1. Annual physical exam    2. Cigar smoker    3. Mixed hyperlipidemia    4. Class 1 obesity due to excess calories with serious comorbidity and body mass index (BMI) of 31.0 to 31.9 in adult    5. ARTUR (obstructive sleep apnea)    6. Screening PSA (prostate specific antigen)      Plan:   Erich was seen today for annual exam.    Diagnoses and all orders for this visit:    Annual physical exam  -     Lipid Panel; Future  -     Comprehensive Metabolic Panel; Future  -     CBC Auto Differential; Future  -     PSA, Screening; Future  -     Hemoglobin A1C; Future    Cigar smoker        -     Recommended cessation.  Pneumovax declined.    Mixed hyperlipidemia  -     Lipid Panel; Future; recommended a low cholesterol diet such as the Mediterranean style diet--overall decreased meat consumption, substituting lean proteins like baked fish/poultry instead of red meats, overall increased vegetable consumption, and substituting healthy oils like extra virgin olive oil in the place of butters/grease.  Also recommended daily aerobic exercise.    Class 1 obesity due to excess calories with serious comorbidity and body mass index (BMI) of 31.0 to 31.9 in adult  -     Hemoglobin A1C; Future  -     Counseled patient on the importance of a healthy low calorie diet, increased daily aerobic exercise, and weight loss.    ARTUR (obstructive sleep apnea)        -     Recommended treatment for ARTUR/f/u with sleep clinic; discussed increased cardiovascular risk in patients with untreated ARTUR    Screening PSA (prostate specific antigen)  -     PSA, Screening; Future

## 2022-04-06 ENCOUNTER — LAB VISIT (OUTPATIENT)
Dept: LAB | Facility: OTHER | Age: 52
End: 2022-04-06
Attending: FAMILY MEDICINE
Payer: COMMERCIAL

## 2022-04-06 DIAGNOSIS — Z12.5 SCREENING PSA (PROSTATE SPECIFIC ANTIGEN): ICD-10-CM

## 2022-04-06 DIAGNOSIS — Z00.00 ANNUAL PHYSICAL EXAM: ICD-10-CM

## 2022-04-06 DIAGNOSIS — E66.09 CLASS 1 OBESITY DUE TO EXCESS CALORIES WITH SERIOUS COMORBIDITY AND BODY MASS INDEX (BMI) OF 31.0 TO 31.9 IN ADULT: ICD-10-CM

## 2022-04-06 DIAGNOSIS — E55.9 VITAMIN D DEFICIENCY: ICD-10-CM

## 2022-04-06 DIAGNOSIS — E78.2 MIXED HYPERLIPIDEMIA: ICD-10-CM

## 2022-04-06 LAB
25(OH)D3+25(OH)D2 SERPL-MCNC: 27 NG/ML (ref 30–96)
ALBUMIN SERPL BCP-MCNC: 4 G/DL (ref 3.5–5.2)
ALP SERPL-CCNC: 47 U/L (ref 55–135)
ALT SERPL W/O P-5'-P-CCNC: 36 U/L (ref 10–44)
ANION GAP SERPL CALC-SCNC: 10 MMOL/L (ref 8–16)
AST SERPL-CCNC: 22 U/L (ref 10–40)
BASOPHILS # BLD AUTO: 0.07 K/UL (ref 0–0.2)
BASOPHILS NFR BLD: 1.4 % (ref 0–1.9)
BILIRUB SERPL-MCNC: 0.6 MG/DL (ref 0.1–1)
BUN SERPL-MCNC: 18 MG/DL (ref 6–20)
CALCIUM SERPL-MCNC: 9.6 MG/DL (ref 8.7–10.5)
CHLORIDE SERPL-SCNC: 106 MMOL/L (ref 95–110)
CHOLEST SERPL-MCNC: 259 MG/DL (ref 120–199)
CHOLEST/HDLC SERPL: 7 {RATIO} (ref 2–5)
CO2 SERPL-SCNC: 26 MMOL/L (ref 23–29)
COMPLEXED PSA SERPL-MCNC: 1.2 NG/ML (ref 0–4)
CREAT SERPL-MCNC: 1.1 MG/DL (ref 0.5–1.4)
DIFFERENTIAL METHOD: ABNORMAL
EOSINOPHIL # BLD AUTO: 0.4 K/UL (ref 0–0.5)
EOSINOPHIL NFR BLD: 6.8 % (ref 0–8)
ERYTHROCYTE [DISTWIDTH] IN BLOOD BY AUTOMATED COUNT: 12.5 % (ref 11.5–14.5)
EST. GFR  (AFRICAN AMERICAN): >60 ML/MIN/1.73 M^2
EST. GFR  (NON AFRICAN AMERICAN): >60 ML/MIN/1.73 M^2
ESTIMATED AVG GLUCOSE: 103 MG/DL (ref 68–131)
GLUCOSE SERPL-MCNC: 104 MG/DL (ref 70–110)
HBA1C MFR BLD: 5.2 % (ref 4–5.6)
HCT VFR BLD AUTO: 47.7 % (ref 40–54)
HDLC SERPL-MCNC: 37 MG/DL (ref 40–75)
HDLC SERPL: 14.3 % (ref 20–50)
HGB BLD-MCNC: 16.3 G/DL (ref 14–18)
IMM GRANULOCYTES # BLD AUTO: 0.02 K/UL (ref 0–0.04)
IMM GRANULOCYTES NFR BLD AUTO: 0.4 % (ref 0–0.5)
LDLC SERPL CALC-MCNC: 171 MG/DL (ref 63–159)
LYMPHOCYTES # BLD AUTO: 1.9 K/UL (ref 1–4.8)
LYMPHOCYTES NFR BLD: 37.4 % (ref 18–48)
MCH RBC QN AUTO: 31.8 PG (ref 27–31)
MCHC RBC AUTO-ENTMCNC: 34.2 G/DL (ref 32–36)
MCV RBC AUTO: 93 FL (ref 82–98)
MONOCYTES # BLD AUTO: 0.6 K/UL (ref 0.3–1)
MONOCYTES NFR BLD: 11.3 % (ref 4–15)
NEUTROPHILS # BLD AUTO: 2.2 K/UL (ref 1.8–7.7)
NEUTROPHILS NFR BLD: 42.7 % (ref 38–73)
NONHDLC SERPL-MCNC: 222 MG/DL
NRBC BLD-RTO: 0 /100 WBC
PLATELET # BLD AUTO: 284 K/UL (ref 150–450)
PMV BLD AUTO: 9.1 FL (ref 9.2–12.9)
POTASSIUM SERPL-SCNC: 5.4 MMOL/L (ref 3.5–5.1)
PROT SERPL-MCNC: 7.2 G/DL (ref 6–8.4)
RBC # BLD AUTO: 5.13 M/UL (ref 4.6–6.2)
SODIUM SERPL-SCNC: 142 MMOL/L (ref 136–145)
TRIGL SERPL-MCNC: 255 MG/DL (ref 30–150)
WBC # BLD AUTO: 5.14 K/UL (ref 3.9–12.7)

## 2022-04-06 PROCEDURE — 36415 COLL VENOUS BLD VENIPUNCTURE: CPT | Performed by: FAMILY MEDICINE

## 2022-04-06 PROCEDURE — 85025 COMPLETE CBC W/AUTO DIFF WBC: CPT | Performed by: FAMILY MEDICINE

## 2022-04-06 PROCEDURE — 84153 ASSAY OF PSA TOTAL: CPT | Performed by: FAMILY MEDICINE

## 2022-04-06 PROCEDURE — 80053 COMPREHEN METABOLIC PANEL: CPT | Performed by: FAMILY MEDICINE

## 2022-04-06 PROCEDURE — 83036 HEMOGLOBIN GLYCOSYLATED A1C: CPT | Performed by: FAMILY MEDICINE

## 2022-04-06 PROCEDURE — 82306 VITAMIN D 25 HYDROXY: CPT | Performed by: FAMILY MEDICINE

## 2022-04-06 PROCEDURE — 80061 LIPID PANEL: CPT | Performed by: FAMILY MEDICINE

## 2022-04-07 ENCOUNTER — TELEPHONE (OUTPATIENT)
Dept: INTERNAL MEDICINE | Facility: CLINIC | Age: 52
End: 2022-04-07
Payer: COMMERCIAL

## 2022-04-07 DIAGNOSIS — E87.5 HYPERKALEMIA: Primary | ICD-10-CM

## 2022-04-14 ENCOUNTER — LAB VISIT (OUTPATIENT)
Dept: LAB | Facility: OTHER | Age: 52
End: 2022-04-14
Payer: COMMERCIAL

## 2022-04-14 DIAGNOSIS — E87.5 HYPERKALEMIA: ICD-10-CM

## 2022-04-14 LAB — POTASSIUM SERPL-SCNC: 4.2 MMOL/L (ref 3.5–5.1)

## 2022-04-14 PROCEDURE — 36415 COLL VENOUS BLD VENIPUNCTURE: CPT | Performed by: FAMILY MEDICINE

## 2022-04-14 PROCEDURE — 84132 ASSAY OF SERUM POTASSIUM: CPT | Performed by: FAMILY MEDICINE

## 2022-05-10 ENCOUNTER — OFFICE VISIT (OUTPATIENT)
Dept: PULMONOLOGY | Facility: CLINIC | Age: 52
End: 2022-05-10
Payer: COMMERCIAL

## 2022-05-10 VITALS
HEART RATE: 73 BPM | SYSTOLIC BLOOD PRESSURE: 118 MMHG | OXYGEN SATURATION: 96 % | HEIGHT: 73 IN | DIASTOLIC BLOOD PRESSURE: 78 MMHG | WEIGHT: 239.19 LBS | BODY MASS INDEX: 31.7 KG/M2

## 2022-05-10 DIAGNOSIS — G47.33 OSA (OBSTRUCTIVE SLEEP APNEA): Primary | ICD-10-CM

## 2022-05-10 PROCEDURE — 3008F PR BODY MASS INDEX (BMI) DOCUMENTED: ICD-10-PCS | Mod: CPTII,S$GLB,, | Performed by: INTERNAL MEDICINE

## 2022-05-10 PROCEDURE — 3008F BODY MASS INDEX DOCD: CPT | Mod: CPTII,S$GLB,, | Performed by: INTERNAL MEDICINE

## 2022-05-10 PROCEDURE — 3078F DIAST BP <80 MM HG: CPT | Mod: CPTII,S$GLB,, | Performed by: INTERNAL MEDICINE

## 2022-05-10 PROCEDURE — 99214 OFFICE O/P EST MOD 30 MIN: CPT | Mod: S$GLB,,, | Performed by: INTERNAL MEDICINE

## 2022-05-10 PROCEDURE — 3044F HG A1C LEVEL LT 7.0%: CPT | Mod: CPTII,S$GLB,, | Performed by: INTERNAL MEDICINE

## 2022-05-10 PROCEDURE — 1159F MED LIST DOCD IN RCRD: CPT | Mod: CPTII,S$GLB,, | Performed by: INTERNAL MEDICINE

## 2022-05-10 PROCEDURE — 99999 PR PBB SHADOW E&M-EST. PATIENT-LVL IV: ICD-10-PCS | Mod: PBBFAC,,, | Performed by: INTERNAL MEDICINE

## 2022-05-10 PROCEDURE — 3074F PR MOST RECENT SYSTOLIC BLOOD PRESSURE < 130 MM HG: ICD-10-PCS | Mod: CPTII,S$GLB,, | Performed by: INTERNAL MEDICINE

## 2022-05-10 PROCEDURE — 99999 PR PBB SHADOW E&M-EST. PATIENT-LVL IV: CPT | Mod: PBBFAC,,, | Performed by: INTERNAL MEDICINE

## 2022-05-10 PROCEDURE — 3074F SYST BP LT 130 MM HG: CPT | Mod: CPTII,S$GLB,, | Performed by: INTERNAL MEDICINE

## 2022-05-10 PROCEDURE — 1159F PR MEDICATION LIST DOCUMENTED IN MEDICAL RECORD: ICD-10-PCS | Mod: CPTII,S$GLB,, | Performed by: INTERNAL MEDICINE

## 2022-05-10 PROCEDURE — 3078F PR MOST RECENT DIASTOLIC BLOOD PRESSURE < 80 MM HG: ICD-10-PCS | Mod: CPTII,S$GLB,, | Performed by: INTERNAL MEDICINE

## 2022-05-10 PROCEDURE — 3044F PR MOST RECENT HEMOGLOBIN A1C LEVEL <7.0%: ICD-10-PCS | Mod: CPTII,S$GLB,, | Performed by: INTERNAL MEDICINE

## 2022-05-10 PROCEDURE — 99214 PR OFFICE/OUTPT VISIT, EST, LEVL IV, 30-39 MIN: ICD-10-PCS | Mod: S$GLB,,, | Performed by: INTERNAL MEDICINE

## 2022-05-10 NOTE — PROGRESS NOTES
Erich Chery  was seen as a follow up.    CHIEF COMPLAINT:    Chief Complaint   Patient presents with    Apnea       HISTORY OF PRESENT ILLNESS: Erich Chery is a 51 y.o. male is here for sleep evaluation.  Our first encounter was 9/27/19.  At that time, patient has difficulty with sleep maintenance since 2017.  No issue with sleep initiation.  However, often wake up after 2-3 hours with difficulty going back to sleep.  Symptoms worsen since 8/19.  +h/o deviated septum.  Loud snoring.  No witnessed apnea.  Fatigue upon awake.  No parasomnia.  No cataplexy.  No rls symptoms.  Patient is not interested in sleep medication.  +dry mouth upon awake.      Patient works as a .  4 nights per week from 5 pm to 12 am.      [place] Sleepiness Scale score during initial sleep evaluation was 3.    S/p hsat 10/7/19.  Patient was lost to follow up.  Per patient, he was not interested in therapy at that time.  Sleep remains restless.      SLEEP ROUTINE:  Activity the hour prior to sleep: snack and watch tv show on mac    Bed partner:  alone  Time to bed:  midnight  Lights off:  off  Sleep onset latency:  15-30 minutes        Disruptions or awakenings:    2 times per night (can take 20-60 minutes to go back to sleep)  Wakeup time:      7 am - 11 am (depending on whether or not his children is not with him)  Perceived sleep quality:  tire       Daytime naps:      none  Weekend sleep routine:      2-3 am to 11 am  Caffeine use: 2 mug of coffee in am   exercise habit:   Limited exercise due to plantar fasciitis      PAST MEDICAL HISTORY:    Active Ambulatory Problems     Diagnosis Date Noted    Chronic pain of left knee 09/15/2016    Hyperlipidemia 05/04/2017    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/2018    Class 1 obesity due to excess calories with serious comorbidity and body mass index (BMI) of 31.0 to 31.9 in adult 09/13/2019    ARTUR (obstructive sleep apnea) 09/27/2019    Insomnia due to medical condition 09/27/2019    Vitamin D  deficiency 2021     Resolved Ambulatory Problems     Diagnosis Date Noted    ACL tear 2016    Preventative health care 2017    Aftercare for anterior cruciate ligament (ACL) repair 2017    Tobacco use 2018     Past Medical History:   Diagnosis Date    Asthma     Deviated septum     Genital herpes in men     Obesity                 PAST SURGICAL HISTORY:    Past Surgical History:   Procedure Laterality Date    COLONOSCOPY N/A 2021    Procedure: COLONOSCOPY;  Surgeon: Leo Delatorre MD;  Location: [facility] ([address] [place] [address]);  Service: Endoscopy;  Laterality: N/A;  1st colonoscopy - screening  pt fully vaccinated-BB    ESOPHAGOGASTRODUODENOSCOPY N/A 2021    Procedure: EGD (ESOPHAGOGASTRODUODENOSCOPY);  Surgeon: Leo Delatorre MD;  Location: [facility] ([address] [place] [address]);  Service: Endoscopy;  Laterality: N/A;  dysphagia to solid foods  pt fully vaccinated-BB    KNEE SURGERY           FAMILY HISTORY:                Family History   Problem Relation Age of Onset    Hyperlipidemia Father     Heart disease Father         CABG    Cancer Paternal Grandfather         lung       SOCIAL HISTORY:          Tobacco:   Social History     Tobacco Use   Smoking Status Light Tobacco Smoker    Packs/day: 0.15    Years: 20.00    Pack years: 3.00    Types: Cigars    Last attempt to quit: 2019    Years since quittin.8   Smokeless Tobacco Former User   Tobacco Comment    states he smokes approximately 10 cigarettes per week       alcohol use:    Social History     Substance and Sexual Activity   Alcohol Use Yes    Alcohol/week: 14.0 standard drinks    Types: 14 Shots of liquor per week                 Occupation:  and owner    ALLERGIES:  Review of patient's allergies indicates:  No Known Allergies    CURRENT MEDICATIONS:    Current Outpatient Medications   Medication Sig Dispense Refill    albuterol (PROVENTIL/VENTOLIN HFA) 90 mcg/actuation inhaler INHALE 2 PUFFS BY MOUTH  "EVERY 6 HOURS AS NEEDED FOR WHEEZING 8.5 g 11    fluticasone propionate (FLONASE) 50 mcg/actuation nasal spray 1 spray (50 mcg total) by Each Nostril route once daily. 15.8 mL 6    meloxicam (MOBIC) 15 MG tablet Take 1 tablet (15 mg total) by mouth once daily. 30 tablet 0    sildenafiL (VIAGRA) 100 MG tablet TAKE 1/2 TO 1 TABLET BY MOUTH DAILY AS NEEDED FOR ERECTILE DYSFUNCTION 15 tablet 11    valACYclovir (VALTREX) 1000 MG tablet TAKE 1 TABLET(1000 MG) BY MOUTH EVERY DAY FOR 5 DAYS 30 tablet 2     No current facility-administered medications for this visit.                  REVIEW OF SYSTEMS:     Sleep related symptoms as per HPI.  CONST:Denies weight gain    HEENT: deviated septum  PULM: Denies dyspnea; no coughing or wheezing  CARD:  Denies palpitations   GI:  +occasional acid reflux  : Denies polyuria  NEURO: Denies headaches  PSYCH: Denies mood disturbance  HEME: Denies anemia   Otherwise, a balance of systems reviewed is negative.          PHYSICAL EXAM:  Vitals:    05/10/22 1422   BP: 118/78   Pulse: 73   SpO2: 96%   Weight: 108.5 kg (239 lb 3.2 oz)   Height: 6' 1" (1.854 m)   PainSc: 0-No pain     Body mass index is 31.56 kg/m².     GENERAL: Normal development, well groomed  HEENT:  Conjunctivae are non-erythematous; Pupils equal, round, and reactive to light; Nose is symmetrical; Nasal mucosa is pink and moist; Septum is midline; Inferior turbinates are normal; Nasal airflow is normal; Posterior pharynx is pink; Modified Mallampati: 3; Posterior palate is normal; Tonsils +1; Uvula is normal and pink;Tongue is normal; Dentition is fair; No TMJ tenderness; Jaw opening and protrusion without click and without discomfort.  +scalloping markings on side of tongue.  NECK: Supple. Neck circumference is 17 inches. No thyromegaly. No palpable nodes.     SKIN: On face and neck: No abrasions, no rashes, no lesions.  No subcutaneous nodules are palpable.  RESPIRATORY: Chest is clear to auscultation.  Normal chest " expansion and non-labored breathing at rest.  CARDIOVASCULAR: Normal S1, S2.  No murmurs, gallops or rubs. No carotid bruits bilaterally.  EXTREMITIES: No edema. No clubbing. No cyanosis. Station normal. Gait normal.        NEURO/PSYCH: Oriented to time, place and person. Normal attention span and concentration. Affect is full. Mood is normal.                                              DATA   9/19/19 ratio 77%; fvc 102%; fev1 99%; dlco 113%  hsat 10/7/19 ahi of 19; rdi of 48    Lab Results   Component Value Date    TSH 1.557 05/17/2017     ASSESSMENT/PLAN  Problem List Items Addressed This Visit     ARTUR (obstructive sleep apnea) - Primary    Overview     -ahi of 19 and rdi of 48  -result d/w patient.  Recommend treatment.  Oral appliance vs apap.  Patient preferred oral appliance.  However, he may consider apap.  Spoken with [facility], patient would still qualify for apap without need for new apap.             Relevant Orders    Ambulatory referral/consult to Dentistry          Deviated septum - s/p ENT surgery.  .        Education: During our discussion today, we talked about the etiology of obstructive sleep apnea as well as the potential ramifications of untreated sleep apnea, which could include daytime sleepiness, hypertension, heart disease and/or stroke.     Precautions: The patient was advised to abstain from driving should they feel sleepy or drowsy.       Thank you for allowing me the opportunity to participate in the care of your patient.    Patient will No follow-ups on file. with md/np.    Please cc note to  No ref. provider found.    25 minutes of total time spent on the encounter, which includes face to face time and non-face to face time preparing to see the patient (eg, review of tests), Obtaining and/or reviewing separately obtained history, documenting clinical information in the electronic or other health record, independently interpreting results (not separately reported) and communicating  results to the patient/family/caregiver, or Care coordination (not separately reported).

## 2022-05-10 NOTE — PATIENT INSTRUCTIONS
Dentists for dental appliance    - [facility] [phone]    [facility]  -Issac Victor   [phone]  [address] [place] [address]    [facility]    - Dr. Jer Perkins     [phone], [address]  [place], [place] [address]      [facility]    - Trino Manuel Jr      [phone]   [place] [address]    -Dr. Harvey Lambert   [phone]  [facility]    -Dr. Saeed Salazar   [phone]    -Humberto Nath DDS ([facility]) [phone]       [facility]    -Boby Brown  [facility]   [address]  [place] [address]    - Madelaine Rodriguez [phone]

## 2022-05-12 ENCOUNTER — TELEPHONE (OUTPATIENT)
Dept: PULMONOLOGY | Facility: CLINIC | Age: 52
End: 2022-05-12
Payer: COMMERCIAL

## 2022-05-12 NOTE — TELEPHONE ENCOUNTER
Please inform patient that he still would still qualify for APAP without need for new sleep study.

## 2022-05-12 NOTE — TELEPHONE ENCOUNTER
----- Message from Chioma Mayfield CRT sent at 5/11/2022 10:16 AM CDT -----  Hezackary Guzmán. Since the patient has [employer] then they will take a Sleep Study up to 5 years old.       ----- Message -----  From: Ernst Guzmán MD  Sent: 5/10/2022   3:33 PM CDT  To: Chioma Mayfield, CARLOS ENRIQUE Bedolla,  Just a quick question.  This patient had mily diagnosed in 2019.  Lost to follow up.  Seen today and would like to consider therapy.  Does he need another sleep study.  Thanks  ernst

## 2022-05-13 ENCOUNTER — TELEPHONE (OUTPATIENT)
Dept: PULMONOLOGY | Facility: CLINIC | Age: 52
End: 2022-05-13
Payer: COMMERCIAL

## 2022-05-13 NOTE — TELEPHONE ENCOUNTER
Sent patient a portal message.            ----- Message from Nathan Myles MA sent at 5/12/2022  7:57 AM CDT -----  Please inform patient that he still would still qualify for APAP without need for new sleep study.

## 2022-05-20 ENCOUNTER — PATIENT MESSAGE (OUTPATIENT)
Dept: PULMONOLOGY | Facility: CLINIC | Age: 52
End: 2022-05-20
Payer: COMMERCIAL

## 2022-06-03 ENCOUNTER — PATIENT MESSAGE (OUTPATIENT)
Dept: INTERNAL MEDICINE | Facility: CLINIC | Age: 52
End: 2022-06-03
Payer: COMMERCIAL

## 2022-06-06 DIAGNOSIS — E78.2 MIXED HYPERLIPIDEMIA: ICD-10-CM

## 2022-06-06 RX ORDER — ATORVASTATIN CALCIUM 40 MG/1
40 TABLET, FILM COATED ORAL DAILY
Qty: 90 TABLET | Refills: 0 | Status: SHIPPED | OUTPATIENT
Start: 2022-06-06 | End: 2022-11-17

## 2022-06-06 NOTE — TELEPHONE ENCOUNTER
I sent in the atorvastatin for him, he should take this at bedtime    Make sure to keep appointment with Dr. Bowen for later this summer, thank you

## 2022-08-24 ENCOUNTER — OFFICE VISIT (OUTPATIENT)
Dept: PODIATRY | Facility: CLINIC | Age: 52
End: 2022-08-24
Payer: COMMERCIAL

## 2022-08-24 VITALS
SYSTOLIC BLOOD PRESSURE: 124 MMHG | HEART RATE: 73 BPM | HEIGHT: 73 IN | BODY MASS INDEX: 31.68 KG/M2 | DIASTOLIC BLOOD PRESSURE: 77 MMHG | WEIGHT: 239 LBS

## 2022-08-24 DIAGNOSIS — M76.62 ACHILLES TENDINITIS OF LEFT LOWER EXTREMITY: Primary | ICD-10-CM

## 2022-08-24 DIAGNOSIS — M24.573 EQUINUS CONTRACTURE OF ANKLE: ICD-10-CM

## 2022-08-24 DIAGNOSIS — S96.912A STRAIN OF ANKLE AND FOOT, LEFT, INITIAL ENCOUNTER: ICD-10-CM

## 2022-08-24 PROCEDURE — 3044F PR MOST RECENT HEMOGLOBIN A1C LEVEL <7.0%: ICD-10-PCS | Mod: CPTII,S$GLB,, | Performed by: PODIATRIST

## 2022-08-24 PROCEDURE — 3008F PR BODY MASS INDEX (BMI) DOCUMENTED: ICD-10-PCS | Mod: CPTII,S$GLB,, | Performed by: PODIATRIST

## 2022-08-24 PROCEDURE — 3074F SYST BP LT 130 MM HG: CPT | Mod: CPTII,S$GLB,, | Performed by: PODIATRIST

## 2022-08-24 PROCEDURE — 3078F DIAST BP <80 MM HG: CPT | Mod: CPTII,S$GLB,, | Performed by: PODIATRIST

## 2022-08-24 PROCEDURE — 3078F PR MOST RECENT DIASTOLIC BLOOD PRESSURE < 80 MM HG: ICD-10-PCS | Mod: CPTII,S$GLB,, | Performed by: PODIATRIST

## 2022-08-24 PROCEDURE — 3008F BODY MASS INDEX DOCD: CPT | Mod: CPTII,S$GLB,, | Performed by: PODIATRIST

## 2022-08-24 PROCEDURE — 3044F HG A1C LEVEL LT 7.0%: CPT | Mod: CPTII,S$GLB,, | Performed by: PODIATRIST

## 2022-08-24 PROCEDURE — 99999 PR PBB SHADOW E&M-EST. PATIENT-LVL III: CPT | Mod: PBBFAC,,, | Performed by: PODIATRIST

## 2022-08-24 PROCEDURE — 99999 PR PBB SHADOW E&M-EST. PATIENT-LVL III: ICD-10-PCS | Mod: PBBFAC,,, | Performed by: PODIATRIST

## 2022-08-24 PROCEDURE — 1159F PR MEDICATION LIST DOCUMENTED IN MEDICAL RECORD: ICD-10-PCS | Mod: CPTII,S$GLB,, | Performed by: PODIATRIST

## 2022-08-24 PROCEDURE — 1159F MED LIST DOCD IN RCRD: CPT | Mod: CPTII,S$GLB,, | Performed by: PODIATRIST

## 2022-08-24 PROCEDURE — 99214 OFFICE O/P EST MOD 30 MIN: CPT | Mod: S$GLB,,, | Performed by: PODIATRIST

## 2022-08-24 PROCEDURE — 99214 PR OFFICE/OUTPT VISIT, EST, LEVL IV, 30-39 MIN: ICD-10-PCS | Mod: S$GLB,,, | Performed by: PODIATRIST

## 2022-08-24 PROCEDURE — 3074F PR MOST RECENT SYSTOLIC BLOOD PRESSURE < 130 MM HG: ICD-10-PCS | Mod: CPTII,S$GLB,, | Performed by: PODIATRIST

## 2022-08-24 RX ORDER — MELOXICAM 15 MG/1
15 TABLET ORAL DAILY
Qty: 30 TABLET | Refills: 0 | Status: SHIPPED | OUTPATIENT
Start: 2022-08-24 | End: 2022-12-28

## 2022-08-24 NOTE — PROGRESS NOTES
Subjective:      Patient ID: Erich Chery is a 51 y.o. male.    Chief Complaint: Foot Problem (Achilles issue)    Continued pain a burning stinging sometimes sharp in nature in the back of the left foot/ankle rapid onset with a mild strain plain soccer back in January without complete resolution since.  Stretches NSAIDs inserts activity modification of not resolved the problem    Review of Systems   Constitutional: Negative for chills, diaphoresis, fever, malaise/fatigue and night sweats.   Cardiovascular: Negative for claudication, cyanosis, leg swelling and syncope.   Skin: Negative for color change, dry skin, nail changes, rash, suspicious lesions and unusual hair distribution.   Musculoskeletal: Negative for falls, joint pain, joint swelling, muscle cramps, muscle weakness and stiffness.   Gastrointestinal: Negative for constipation, diarrhea, nausea and vomiting.   Neurological: Negative for brief paralysis, disturbances in coordination, focal weakness, numbness, paresthesias, sensory change and tremors.           Objective:      Physical Exam  Constitutional:       General: He is not in acute distress.     Appearance: He is well-developed. He is not diaphoretic.   Cardiovascular:      Pulses:           Popliteal pulses are 2+ on the right side and 2+ on the left side.        Dorsalis pedis pulses are 2+ on the right side and 2+ on the left side.        Posterior tibial pulses are 2+ on the right side and 2+ on the left side.      Comments: Capillary refill 3 seconds all toes/distal feet, all toes/both feet warm to touch.      Negative lymphadenopathy bilateral popliteal fossa and tarsal tunnel.      Negavie lower extremity edema bilateral.    Musculoskeletal:      Right ankle: No swelling, deformity, ecchymosis or lacerations. Normal range of motion. Normal pulse.      Right Achilles Tendon: Normal. No defects. Almonte's test negative.      Comments: Sharp pain to deep palpation the mild tendinous  junction just distal to it in the left Achilles tendon with mild fusiform thickening without palpable defect or discontinuity.  Normal brandt test bilateral.    Ankle dorsiflexion decreased at <10 degrees bilateral with moderate increase with knee flexion bilateral.    Otherwise, Normal angle, base, station of gait. All ten toes without clubbing, cyanosis, or signs of ischemia.  No pain to palpation bilateral lower extremities.  Range of motion, stability, muscle strength, and muscle tone normal bilateral feet and legs.    Lymphadenopathy:      Lower Body: No right inguinal adenopathy. No left inguinal adenopathy.      Comments: Negative lymphadenopathy bilateral popliteal fossa and tarsal tunnel.    Negative lymphangitic streaking bilateral feet/ankles/legs.   Skin:     General: Skin is warm and dry.      Capillary Refill: Capillary refill takes 2 to 3 seconds.      Coloration: Skin is not pale.      Findings: No abrasion, bruising, burn, ecchymosis, erythema, laceration, lesion or rash.      Nails: There is no clubbing.      Comments: Skin is normal age and health appropriate color, turgor, texture, and temperature bilateral lower extremities without ulceration, hyperpigmentation, discoloration, masses nodules or cords palpated.  No ecchymosis, erythema, edema, or cardinal signs of infection bilateral lower extremities.     Neurological:      Mental Status: He is alert and oriented to person, place, and time.      Sensory: No sensory deficit.      Motor: No tremor, atrophy or abnormal muscle tone.      Gait: Gait normal.      Deep Tendon Reflexes:      Reflex Scores:       Patellar reflexes are 2+ on the right side and 2+ on the left side.       Achilles reflexes are 2+ on the right side and 2+ on the left side.     Comments: Negative allodynia both feet    Negative tinel sign to percussion sural, superficial peroneal, deep peroneal, saphenous, and posterior tibial nerves right and left ankles and feet.      Psychiatric:         Behavior: Behavior is cooperative.               Assessment:       Encounter Diagnoses   Name Primary?    Achilles tendinitis of left lower extremity Yes    Equinus contracture of ankle     Strain of ankle and foot, left, initial encounter          Plan:       Erich was seen today for foot problem.    Diagnoses and all orders for this visit:    Achilles tendinitis of left lower extremity    Equinus contracture of ankle    Strain of ankle and foot, left, initial encounter    Other orders  -     meloxicam (MOBIC) 15 MG tablet; Take 1 tablet (15 mg total) by mouth once daily.      I counseled the patient on his conditions, their implications and medical management.        Left all times.  Can remove for exercise bike then immediately replace.  Must stop exercise cycling on a stationary bike if discomfort is encountered.  Verbalizes understanding.      Patient will stretch the tendo achilles complex three times daily as demonstrated in the office.  Literature was dispensed illustrating proper stretching technique.    The patient was advised that NSAID-type medications have two very important potential side effects: gastrointestinal irritation including hemorrhage and renal injuries. He was asked to take the medication with food and to stop if he experiences any GI upset. I asked him to call for vomiting, abdominal pain or black/bloody stools. The patient expresses understanding of these issues and questions were answered.    meloxicam          Follow up in about 6 weeks (around 10/5/2022).

## 2022-08-25 ENCOUNTER — PATIENT MESSAGE (OUTPATIENT)
Dept: PODIATRY | Facility: CLINIC | Age: 52
End: 2022-08-25
Payer: COMMERCIAL

## 2022-09-06 ENCOUNTER — PATIENT MESSAGE (OUTPATIENT)
Dept: PODIATRY | Facility: CLINIC | Age: 52
End: 2022-09-06
Payer: COMMERCIAL

## 2022-10-05 ENCOUNTER — OFFICE VISIT (OUTPATIENT)
Dept: PODIATRY | Facility: CLINIC | Age: 52
End: 2022-10-05
Payer: COMMERCIAL

## 2022-10-05 VITALS
DIASTOLIC BLOOD PRESSURE: 81 MMHG | HEIGHT: 73 IN | HEART RATE: 57 BPM | BODY MASS INDEX: 31.68 KG/M2 | SYSTOLIC BLOOD PRESSURE: 129 MMHG | WEIGHT: 239 LBS

## 2022-10-05 DIAGNOSIS — S96.912A STRAIN OF ANKLE AND FOOT, LEFT, INITIAL ENCOUNTER: ICD-10-CM

## 2022-10-05 DIAGNOSIS — M76.62 ACHILLES TENDINITIS OF LEFT LOWER EXTREMITY: Primary | ICD-10-CM

## 2022-10-05 DIAGNOSIS — M24.573 EQUINUS CONTRACTURE OF ANKLE: ICD-10-CM

## 2022-10-05 PROCEDURE — 3008F BODY MASS INDEX DOCD: CPT | Mod: CPTII,S$GLB,, | Performed by: PODIATRIST

## 2022-10-05 PROCEDURE — 99999 PR PBB SHADOW E&M-EST. PATIENT-LVL III: CPT | Mod: PBBFAC,,, | Performed by: PODIATRIST

## 2022-10-05 PROCEDURE — 3079F PR MOST RECENT DIASTOLIC BLOOD PRESSURE 80-89 MM HG: ICD-10-PCS | Mod: CPTII,S$GLB,, | Performed by: PODIATRIST

## 2022-10-05 PROCEDURE — 99213 PR OFFICE/OUTPT VISIT, EST, LEVL III, 20-29 MIN: ICD-10-PCS | Mod: S$GLB,,, | Performed by: PODIATRIST

## 2022-10-05 PROCEDURE — 3074F PR MOST RECENT SYSTOLIC BLOOD PRESSURE < 130 MM HG: ICD-10-PCS | Mod: CPTII,S$GLB,, | Performed by: PODIATRIST

## 2022-10-05 PROCEDURE — 3074F SYST BP LT 130 MM HG: CPT | Mod: CPTII,S$GLB,, | Performed by: PODIATRIST

## 2022-10-05 PROCEDURE — 3044F HG A1C LEVEL LT 7.0%: CPT | Mod: CPTII,S$GLB,, | Performed by: PODIATRIST

## 2022-10-05 PROCEDURE — 99213 OFFICE O/P EST LOW 20 MIN: CPT | Mod: S$GLB,,, | Performed by: PODIATRIST

## 2022-10-05 PROCEDURE — 99999 PR PBB SHADOW E&M-EST. PATIENT-LVL III: ICD-10-PCS | Mod: PBBFAC,,, | Performed by: PODIATRIST

## 2022-10-05 PROCEDURE — 3079F DIAST BP 80-89 MM HG: CPT | Mod: CPTII,S$GLB,, | Performed by: PODIATRIST

## 2022-10-05 PROCEDURE — 1159F MED LIST DOCD IN RCRD: CPT | Mod: CPTII,S$GLB,, | Performed by: PODIATRIST

## 2022-10-05 PROCEDURE — 1159F PR MEDICATION LIST DOCUMENTED IN MEDICAL RECORD: ICD-10-PCS | Mod: CPTII,S$GLB,, | Performed by: PODIATRIST

## 2022-10-05 PROCEDURE — 3008F PR BODY MASS INDEX (BMI) DOCUMENTED: ICD-10-PCS | Mod: CPTII,S$GLB,, | Performed by: PODIATRIST

## 2022-10-05 PROCEDURE — 3044F PR MOST RECENT HEMOGLOBIN A1C LEVEL <7.0%: ICD-10-PCS | Mod: CPTII,S$GLB,, | Performed by: PODIATRIST

## 2022-10-05 NOTE — PROGRESS NOTES
Subjective:      Patient ID: Erich Chery is a 51 y.o. male.    Chief Complaint: Follow-up (Achilles issue)    Continued pain a burning stinging sometimes sharp in nature in the back of the left foot/ankle rapid onset with a mild strain plain soccer back in January without complete resolution since.  Stretches NSAIDs inserts activity modification of not resolved the problem    Review of Systems   Constitutional: Negative for chills, diaphoresis, fever, malaise/fatigue and night sweats.   Cardiovascular:  Negative for claudication, cyanosis, leg swelling and syncope.   Skin:  Negative for color change, dry skin, nail changes, rash, suspicious lesions and unusual hair distribution.   Musculoskeletal:  Negative for falls, joint pain, joint swelling, muscle cramps, muscle weakness and stiffness.   Gastrointestinal:  Negative for constipation, diarrhea, nausea and vomiting.   Neurological:  Negative for brief paralysis, disturbances in coordination, focal weakness, numbness, paresthesias, sensory change and tremors.           Objective:      Physical Exam  Constitutional:       General: He is not in acute distress.     Appearance: He is well-developed. He is not diaphoretic.   Cardiovascular:      Pulses:           Popliteal pulses are 2+ on the right side and 2+ on the left side.        Dorsalis pedis pulses are 2+ on the right side and 2+ on the left side.        Posterior tibial pulses are 2+ on the right side and 2+ on the left side.      Comments: Capillary refill 3 seconds all toes/distal feet, all toes/both feet warm to touch.      Negative lymphadenopathy bilateral popliteal fossa and tarsal tunnel.      Negavie lower extremity edema bilateral.    Musculoskeletal:      Right ankle: No swelling, deformity, ecchymosis or lacerations. Normal range of motion. Normal pulse.      Right Achilles Tendon: Normal. No defects. Almonte's test negative.      Comments: Sharp pain though less than prior, to deep  palpation the mild tendinous junction just distal to it in the left Achilles tendon  more than right with mild fusiform thickening without palpable defect or discontinuity.  Normal brandt test bilateral.    Ankle dorsiflexion decreased at <10 degrees bilateral with moderate increase with knee flexion bilateral.    Otherwise, Normal angle, base, station of gait. All ten toes without clubbing, cyanosis, or signs of ischemia.  No pain to palpation bilateral lower extremities.  Range of motion, stability, muscle strength, and muscle tone normal bilateral feet and legs.    Lymphadenopathy:      Lower Body: No right inguinal adenopathy. No left inguinal adenopathy.      Comments: Negative lymphadenopathy bilateral popliteal fossa and tarsal tunnel.    Negative lymphangitic streaking bilateral feet/ankles/legs.   Skin:     General: Skin is warm and dry.      Capillary Refill: Capillary refill takes 2 to 3 seconds.      Coloration: Skin is not pale.      Findings: No abrasion, bruising, burn, ecchymosis, erythema, laceration, lesion or rash.      Nails: There is no clubbing.      Comments: Skin is normal age and health appropriate color, turgor, texture, and temperature bilateral lower extremities without ulceration, hyperpigmentation, discoloration, masses nodules or cords palpated.  No ecchymosis, erythema, edema, or cardinal signs of infection bilateral lower extremities.     Neurological:      Mental Status: He is alert and oriented to person, place, and time.      Sensory: No sensory deficit.      Motor: No tremor, atrophy or abnormal muscle tone.      Gait: Gait normal.      Deep Tendon Reflexes:      Reflex Scores:       Patellar reflexes are 2+ on the right side and 2+ on the left side.       Achilles reflexes are 2+ on the right side and 2+ on the left side.     Comments: Negative allodynia both feet    Negative tinel sign to percussion sural, superficial peroneal, deep peroneal, saphenous, and posterior tibial  nerves right and left ankles and feet.     Psychiatric:         Behavior: Behavior is cooperative.             Assessment:       Encounter Diagnoses   Name Primary?    Achilles tendinitis of left lower extremity Yes    Equinus contracture of ankle     Strain of ankle and foot, left, initial encounter          Plan:       Erich was seen today for follow-up.    Diagnoses and all orders for this visit:    Achilles tendinitis of left lower extremity  -     Ambulatory referral/consult to Physical/Occupational Therapy; Future    Equinus contracture of ankle  -     Ambulatory referral/consult to Physical/Occupational Therapy; Future    Strain of ankle and foot, left, initial encounter  -     Ambulatory referral/consult to Physical/Occupational Therapy; Future    I counseled the patient on his conditions, their implications and medical management.        Left all times.  Can remove for exercise bike then immediately replace.  Must stop exercise cycling on a stationary bike if discomfort is encountered.  Verbalizes understanding.      Patient will stretch the tendo achilles complex three times daily as demonstrated in the office.  Literature was dispensed illustrating proper stretching technique.    The patient was advised that NSAID-type medications have two very important potential side effects: gastrointestinal irritation including hemorrhage and renal injuries. He was asked to take the medication with food and to stop if he experiences any GI upset. I asked him to call for vomiting, abdominal pain or black/bloody stools. The patient expresses understanding of these issues and questions were answered.    meloxicam          Follow up in about 6 weeks (around 11/16/2022).

## 2022-10-10 DIAGNOSIS — R06.89 BREATHING DIFFICULTY: ICD-10-CM

## 2022-10-10 RX ORDER — ALBUTEROL SULFATE 90 UG/1
AEROSOL, METERED RESPIRATORY (INHALATION)
Qty: 8.5 G | Refills: 11 | Status: SHIPPED | OUTPATIENT
Start: 2022-10-10 | End: 2022-12-28 | Stop reason: SDUPTHER

## 2022-10-20 ENCOUNTER — CLINICAL SUPPORT (OUTPATIENT)
Dept: REHABILITATION | Facility: OTHER | Age: 52
End: 2022-10-20
Attending: PODIATRIST
Payer: COMMERCIAL

## 2022-10-20 DIAGNOSIS — S96.912A STRAIN OF ANKLE AND FOOT, LEFT, INITIAL ENCOUNTER: ICD-10-CM

## 2022-10-20 DIAGNOSIS — M25.572 LEFT ANKLE PAIN, UNSPECIFIED CHRONICITY: ICD-10-CM

## 2022-10-20 DIAGNOSIS — R26.2 DIFFICULTY WALKING: ICD-10-CM

## 2022-10-20 DIAGNOSIS — M76.62 ACHILLES TENDINITIS OF LEFT LOWER EXTREMITY: ICD-10-CM

## 2022-10-20 DIAGNOSIS — M24.573 EQUINUS CONTRACTURE OF ANKLE: ICD-10-CM

## 2022-10-20 PROCEDURE — 97110 THERAPEUTIC EXERCISES: CPT | Mod: PN,97

## 2022-10-20 PROCEDURE — 97162 PT EVAL MOD COMPLEX 30 MIN: CPT | Mod: PN,97

## 2022-10-20 PROCEDURE — 97140 MANUAL THERAPY 1/> REGIONS: CPT | Mod: PN

## 2022-10-20 NOTE — PLAN OF CARE
[facility]   Physical Therapy Initial Evaluation     Date: 10/20/2022   Name: Erich Chery  Clinic Number: [id number]    Therapy Diagnosis:   Encounter Diagnoses   Name Primary?    Left ankle pain, unspecified chronicity     Difficulty walking      Physician: Nicolas Castanon DPM    Physician Orders: PT Eval and Treat   Medical Diagnosis from Referral: M76.62 (ICD-10-CM) - Achilles tendinitis of left lower extremity M24.573 (ICD-10-CM) - Equinus contracture of ankle S96.912A (ICD-10-CM) - Strain of ankle and foot, left, initial encounter   Evaluation Date: 10/20/2022  Authorization Period Expiration: 10/23/2023  Plan of Care Expiration: 12/20/2022  Visit # / Visits authorized: 1/ auth pending   FOTO: 1/3 - 10/20/2022    Precautions: Standard     Time In: 11:15 am   Time Out: 12:13 pm  Total Appointment Time (timed & untimed codes): 58 minutes      SUBJECTIVE     Date of onset: 1.5 months ago    History of current condition - Erich reports he initially injured his L achilles tendon while playing in a soccer game in May 2022 but did not receive any treatment at that time and it began to feel better on its own. Patient reports he re-aggravated the L achilles a month and a half ago at a different soccer game that recreated his familiar symptoms. Patient states he noticed increased swelling in the L achilles after returning to soccer. Patient reports he went to the doctor and wore a boot for 6 weeks and stopped wearing the boot on October 9th. Patient states his achilles has been starting to feel better since then. Patient notes his symptoms are more prominent in the morning time. Patient reports he is feeling mild pain and moderate stiffness in the left achilles with activities like running 300 yards, pushing off on the L ankle, and is currently unable to participate in recreational tasks such as soccer. Patient was previously playing soccer 1x a week. Patient reports he has a history of  R and L plantar fascitis pain and previously had physical therapy post L ACL surgery in 2016.     Falls/MVAs: none    Imaging: L foot X-ray 6/17/2021  Impression:  Calcaneal spurring and bony projection along proximal phalanx of the great toe.  Otherwise unremarkable three-view appearance of the left foot    Prior Therapy: previous physical therapy tx for L ACL post op, no prior treatment for achilles  Social History: lives with kids,15 stairs to get into house  Occupation: /business owner  Prior Level of Function: no limitation  Current Level of Function: unable to play soccer, difficulty pushing off foot, difficulty with running    Pain:  Current 0/10, worst 3/10, best /010   Location:  L achilles tendon  Description: achey, intermittent  Aggravating Factors: pushing through the L LE  Easing Factors: ice, medication    Patients goals: be able to go skiing, get back to soccer     Medical History:   Past Medical History:   Diagnosis Date    ACL tear 3/9/2016    Asthma     Deviated septum     Genital herpes in men     Hyperlipidemia     Obesity     Tobacco use 11/28/2018       Surgical History:   Erich Chery  has a past surgical history that includes Knee surgery; Esophagogastroduodenoscopy (N/A, 7/22/2021); and Colonoscopy (N/A, 7/22/2021).    Medications:   Erich has a current medication list which includes the following prescription(s): albuterol, atorvastatin, fluticasone propionate, meloxicam, sildenafil, and valacyclovir.    Allergies:   Review of patient's allergies indicates:  No Known Allergies       OBJECTIVE   Observation/Posture:   -Mild protuberance on L achilles and mild swelling around L ankle    -Slight bilateral outtoeing in sitting and standing   -No specific wear patterns noticed on bottom of tennis shoes    Range of Motion: ACTIVE RANGE OF MOTION:    Ankle Left Right   Dorsiflexion 10 degrees 20 degrees   Plantarflexion 15 degrees 19 degrees   Inversion 30 degrees 27 degrees   Eversion  "10 degrees 15 degrees     Strength:  Ankle Left Right   Dorsiflexion 5 5   Plantarflexion 25 B heel raises with mild soreness after 10-12 repetitions 25 B heel raises   Inversion 5 5   Eversion 5 5       Special Tests:  Ankle Left Right   Anterior Drawer Test Negative Negative   Squeeze test Negative Negative   Almonte test Negative Negative     Joint Mobility:  R/L AP TCJ Gr III/IV JM - hypomobile with L>R  L subtalar joint inversion/eversion Gr III/IV JM - hypomobile    Edema:  Figure 8 measurement - L - 56.4 cm, R- 54 cm  Malleoli circumference - L - 25.2 cm,  R- 27.2 cm    Function/Balance:  SLS - R - 30 seconds with good ankle strategy; L - 30 seconds with fair ankle strategy  30 s sit to stand - 15 sit to stands       Limitation/Restriction for FOTO Ankle Survey    Therapist reviewed FOTO scores for Erich Chery on 10/20/2022.   FOTO documents entered into [employer] - see Media section.    Limitation Score: 38%  Predicted Score: 24%         TREATMENT     Total Treatment time (time-based codes) separate from Evaluation: 25 minutes      Erich received the treatments listed below:      therapeutic exercises to develop strength, endurance, ROM, flexibility, posture, and core stabilization for 15 minutes including:  +Bilateral calf raises off 4" step; focus on eccentric control down, 2x20  +L DF mobilization lunges with L foot forward, 10" x 10  +Bilateral gastroc stretch on incline, 30"x3  +education on POC, written home exercise program,nature of condition  (Initiate) SL weight shifts w heel on ground  (Initate) sitting heel raises with weight box on leg    manual therapy techniques were applied or 10 minutes, including:  +L A/P TCJ Gr III/IV JM  +L forefoot inversion/eversion Gr II/III   (Initiate) L Talocrural distraction  (Initiate) mobilization with movement for ankle impingement    neuromuscular re-education activities to improve: Balance, Coordination, Kinesthetic, Sense, Proprioception, and Posture for " 00 minutes. The following activities were included:  (Initiate) SL stance on airex with cone tapping    therapeutic activities to improve functional performance for 00 minutes, including:  (Initiate) treadmill walking and jogging intervals  (Initate) SL squats from chair    PATIENT EDUCATION AND HOME EXERCISES     Education provided:   - yes, exercise form and rationale     Written Home Exercises Provided: yes. Initial written home exercise program issued and exercises were reviewed and Erich was able to demonstrate them prior to the end of the session.  Erich demonstrated good  understanding of the education provided. See EMR under Patient Instructions for exercises provided during therapy sessions.    ASSESSMENT     Erich is a 51 y.o. male referred to outpatient Physical Therapy with a medical diagnosis of M76.62 (ICD-10-CM) - Achilles tendinitis of left lower extremity M24.573 (ICD-10-CM) - Equinus contracture of ankle S96.912A (ICD-10-CM) - Strain of ankle and foot, left, initial encounter . Patient presents with decreased L ankle ROM, swelling in L ankle, increased mass at middle 1/3 of L achilles tendon, hypomobility in L talocrural and subtalar joints, fair ankle strategy in L single limb stance, and decreased eccentric control of L gastroc when lowering down from heel raises.    Patient prognosis: Good.   Rehab potential: Good    Patient will benefit from skilled outpatient Physical Therapy to address the deficits stated above and in the chart below, provide patient /family education, and to maximize patientt's level of independence.     Plan of care discussed with patient: Yes  Patient's spiritual, cultural and educational needs considered and patient is agreeable to the plan of care and goals as stated below:     Medical Necessity is demonstrated by the following  History  Co-morbidities and personal factors that may impact the plan of care Co-morbidities:   Past Medical History:   Diagnosis Date    ACL tear  3/9/2016    Asthma     Deviated septum     Genital herpes in men     Hyperlipidemia     Obesity     Tobacco use 11/28/2018       Personal Factors:   lifestyle  -previous L achilles injury  -previous L ACL surgery      moderate   Examination  Body Structures and Functions, activity limitations and participation restrictions that may impact the plan of care Body Regions:   back  lower extremities    Body Systems:    gross symmetry  ROM  strength  gross coordinated movement  balance  gait  transfers  transitions  motor control  motor learning    Participation Restrictions:   -unable to participate in ADLs and recreational tasks  -unable to play soccer  -difficulty with jogging    Activity limitations:   Learning and applying knowledge  no deficits    General Tasks and Commands  no deficits    Communication  no deficits    Mobility  walking  Difficulty running    Self care  dressing    Domestic Life  shopping  doing house work (cleaning house, washing dishes, laundry)    Interactions/Relationships  basic interpersonal interactions  relating with strangers    Life Areas  no deficits    Community and Social Life  community life  recreation and leisure         moderate   Clinical Presentation evolving clinical presentation with changing clinical characteristics moderate   Decision Making/ Complexity Score: moderate     Anticipated Barriers for therapy: co morbidities, insurance copay, busy work schedule    Goals:  Short Term Goals: In 3 weeks:  Patient will be independent with home exercise program (Initial, not met)  Patient will improve L ankle DF ROM to 15 degrees in order to navigate 15 stairs to get into the home (Initial, not met)  Patient will be able to perform 20 L single leg heel raises with good eccentric control in order to ski for a full day (Initial, not met)    Long Term Goals: In 6 weeks:  Patient will improve L ankle DF ROM to 20 degrees in order to run 300 yards. (Initial, not met)  Patient will improve L  ankle Eversion ROM to 15 degrees in order to return to kicking a ball 10 times during a soccer match (Initial, not met)  Patient will be able to perform L single limb stance for 30 seconds with good ankle strategy in order to return to playing soccer once a week. (Initial, not met)    PLAN   Plan of care Certification: 10/20/2022 to 12/10/2022.    Outpatient Physical Therapy 2 times weekly for 6 weeks to include the following interventions: Cervical/Lumbar Traction, Electrical Stimulation dry needling, re-eval, Gait Training, Manual Therapy, Moist Heat/ Ice, Neuromuscular Re-ed, Patient Education, Self Care, Therapeutic Activities, Therapeutic Exercise, and Ultrasound.     Physical therapist and physical therapy assistant(s) met face to face to discuss patient's treatment plan and progress towards established goals. Pt will be seen by a physical therapist minimally every 6th visit or every 30 days.    Student physical therapist, Lauren Greenberg, participated in treatment session under direct supervision of licensed physical therapist    Nina Hagen, PT      I CERTIFY THE NEED FOR THESE SERVICES FURNISHED UNDER THIS PLAN OF TREATMENT AND WHILE UNDER MY CARE   Physician's comments:     Physician's Signature: ___________________________________________________

## 2022-10-20 NOTE — PROGRESS NOTES
[facility]   Physical Therapy Initial Evaluation     Date: 10/20/2022   Name: Erich Chery  Clinic Number: [id number]    Therapy Diagnosis:   Encounter Diagnoses   Name Primary?    Left ankle pain, unspecified chronicity     Difficulty walking      Physician: Nicolas Castanon DPM    Physician Orders: PT Eval and Treat   Medical Diagnosis from Referral: M76.62 (ICD-10-CM) - Achilles tendinitis of left lower extremity M24.573 (ICD-10-CM) - Equinus contracture of ankle S96.912A (ICD-10-CM) - Strain of ankle and foot, left, initial encounter   Evaluation Date: 10/20/2022  Authorization Period Expiration: 10/23/2023  Plan of Care Expiration: 12/20/2022  Visit # / Visits authorized: 1/ auth pending   FOTO: 1/3 - 10/20/2022    Precautions: Standard     Time In: 11:15 am   Time Out: 12:13 pm  Total Appointment Time (timed & untimed codes): 58 minutes      SUBJECTIVE     Date of onset: 1.5 months ago    History of current condition - Erich reports he initially injured his L achilles tendon while playing in a soccer game in May 2022 but did not receive any treatment at that time and it began to feel better on its own. Patient reports he re-aggravated the L achilles a month and a half ago at a different soccer game that recreated his familiar symptoms. Patient states he noticed increased swelling in the L achilles after returning to soccer. Patient reports he went to the doctor and wore a boot for 6 weeks and stopped wearing the boot on October 9th. Patient states his achilles has been starting to feel better since then. Patient notes his symptoms are more prominent in the morning time. Patient reports he is feeling mild pain and moderate stiffness in the left achilles with activities like running 300 yards, pushing off on the L ankle, and is currently unable to participate in recreational tasks such as soccer. Patient was previously playing soccer 1x a week. Patient reports he has a history of  R and L plantar fascitis pain and previously had physical therapy post L ACL surgery in 2016.     Falls/MVAs: none    Imaging: L foot X-ray 6/17/2021  Impression:  Calcaneal spurring and bony projection along proximal phalanx of the great toe.  Otherwise unremarkable three-view appearance of the left foot    Prior Therapy: previous physical therapy tx for L ACL post op, no prior treatment for achilles  Social History: lives with kids,15 stairs to get into house  Occupation: /business owner  Prior Level of Function: no limitation  Current Level of Function: unable to play soccer, difficulty pushing off foot, difficulty with running    Pain:  Current 0/10, worst 3/10, best /010   Location:  L achilles tendon  Description: achey, intermittent  Aggravating Factors: pushing through the L LE  Easing Factors: ice, medication    Patients goals: be able to go skiing, get back to soccer     Medical History:   Past Medical History:   Diagnosis Date    ACL tear 3/9/2016    Asthma     Deviated septum     Genital herpes in men     Hyperlipidemia     Obesity     Tobacco use 11/28/2018       Surgical History:   Erich Chery  has a past surgical history that includes Knee surgery; Esophagogastroduodenoscopy (N/A, 7/22/2021); and Colonoscopy (N/A, 7/22/2021).    Medications:   Erich has a current medication list which includes the following prescription(s): albuterol, atorvastatin, fluticasone propionate, meloxicam, sildenafil, and valacyclovir.    Allergies:   Review of patient's allergies indicates:  No Known Allergies       OBJECTIVE   Observation/Posture:   -Mild protuberance on L achilles and mild swelling around L ankle    -Slight bilateral outtoeing in sitting and standing   -No specific wear patterns noticed on bottom of tennis shoes    Range of Motion: ACTIVE RANGE OF MOTION:    Ankle Left Right   Dorsiflexion 10 degrees 20 degrees   Plantarflexion 15 degrees 19 degrees   Inversion 30 degrees 27 degrees   Eversion  "10 degrees 15 degrees     Strength:  Ankle Left Right   Dorsiflexion 5 5   Plantarflexion 25 B heel raises with mild soreness after 10-12 repetitions 25 B heel raises   Inversion 5 5   Eversion 5 5       Special Tests:  Ankle Left Right   Anterior Drawer Test Negative Negative   Squeeze test Negative Negative   Almonte test Negative Negative     Joint Mobility:  R/L AP TCJ Gr III/IV JM - hypomobile with L>R  L subtalar joint inversion/eversion Gr III/IV JM - hypomobile    Edema:  Figure 8 measurement - L - 56.4 cm, R- 54 cm  Malleoli circumference - L - 25.2 cm,  R- 27.2 cm    Function/Balance:  SLS - R - 30 seconds with good ankle strategy; L - 30 seconds with fair ankle strategy  30 s sit to stand - 15 sit to stands       Limitation/Restriction for FOTO Ankle Survey    Therapist reviewed FOTO scores for Erich Chery on 10/20/2022.   FOTO documents entered into [employer] - see Media section.    Limitation Score: 38%  Predicted Score: 24%         TREATMENT     Total Treatment time (time-based codes) separate from Evaluation: 25 minutes      Erich received the treatments listed below:      therapeutic exercises to develop strength, endurance, ROM, flexibility, posture, and core stabilization for 15 minutes including:  +Bilateral calf raises off 4" step; focus on eccentric control down, 2x20  +L DF mobilization lunges with L foot forward, 10" x 10  +Bilateral gastroc stretch on incline, 30"x3  +education on POC, written home exercise program,nature of condition  (Initiate) SL weight shifts w heel on ground  (Initate) sitting heel raises with weight box on leg    manual therapy techniques were applied or 10 minutes, including:  +L A/P TCJ Gr III/IV JM  +L forefoot inversion/eversion Gr II/III   (Initiate) L Talocrural distraction  (Initiate) mobilization with movement for ankle impingement    neuromuscular re-education activities to improve: Balance, Coordination, Kinesthetic, Sense, Proprioception, and Posture for " 00 minutes. The following activities were included:  (Initiate) SL stance on airex with cone tapping    therapeutic activities to improve functional performance for 00 minutes, including:  (Initiate) treadmill walking and jogging intervals  (Initate) SL squats from chair    PATIENT EDUCATION AND HOME EXERCISES     Education provided:   - yes, exercise form and rationale     Written Home Exercises Provided: yes. Initial written home exercise program issued and exercises were reviewed and Erich was able to demonstrate them prior to the end of the session.  Erich demonstrated good  understanding of the education provided. See EMR under Patient Instructions for exercises provided during therapy sessions.    ASSESSMENT     Erich is a 51 y.o. male referred to outpatient Physical Therapy with a medical diagnosis of M76.62 (ICD-10-CM) - Achilles tendinitis of left lower extremity M24.573 (ICD-10-CM) - Equinus contracture of ankle S96.912A (ICD-10-CM) - Strain of ankle and foot, left, initial encounter . Patient presents with decreased L ankle ROM, swelling in L ankle, increased mass at middle 1/3 of L achilles tendon, hypomobility in L talocrural and subtalar joints, fair ankle strategy in L single limb stance, and decreased eccentric control of L gastroc when lowering down from heel raises.    Patient prognosis: Good.   Rehab potential: Good    Patient will benefit from skilled outpatient Physical Therapy to address the deficits stated above and in the chart below, provide patient /family education, and to maximize patientt's level of independence.     Plan of care discussed with patient: Yes  Patient's spiritual, cultural and educational needs considered and patient is agreeable to the plan of care and goals as stated below:     Medical Necessity is demonstrated by the following  History  Co-morbidities and personal factors that may impact the plan of care Co-morbidities:   Past Medical History:   Diagnosis Date    ACL tear  3/9/2016    Asthma     Deviated septum     Genital herpes in men     Hyperlipidemia     Obesity     Tobacco use 11/28/2018       Personal Factors:   lifestyle  -previous L achilles injury  -previous L ACL surgery      moderate   Examination  Body Structures and Functions, activity limitations and participation restrictions that may impact the plan of care Body Regions:   back  lower extremities    Body Systems:    gross symmetry  ROM  strength  gross coordinated movement  balance  gait  transfers  transitions  motor control  motor learning    Participation Restrictions:   -unable to participate in ADLs and recreational tasks  -unable to play soccer  -difficulty with jogging    Activity limitations:   Learning and applying knowledge  no deficits    General Tasks and Commands  no deficits    Communication  no deficits    Mobility  walking  Difficulty running    Self care  dressing    Domestic Life  shopping  doing house work (cleaning house, washing dishes, laundry)    Interactions/Relationships  basic interpersonal interactions  relating with strangers    Life Areas  no deficits    Community and Social Life  community life  recreation and leisure         moderate   Clinical Presentation evolving clinical presentation with changing clinical characteristics moderate   Decision Making/ Complexity Score: moderate     Anticipated Barriers for therapy: co morbidities, insurance copay, busy work schedule    Goals:  Short Term Goals: In 3 weeks:  Patient will be independent with home exercise program (Initial, not met)  Patient will improve L ankle DF ROM to 15 degrees in order to navigate 15 stairs to get into the home (Initial, not met)  Patient will be able to perform 20 L single leg heel raises with good eccentric control in order to ski for a full day (Initial, not met)    Long Term Goals: In 6 weeks:  Patient will improve L ankle DF ROM to 20 degrees in order to run 300 yards. (Initial, not met)  Patient will improve L  ankle Eversion ROM to 15 degrees in order to return to kicking a ball 10 times during a soccer match (Initial, not met)  Patient will be able to perform L single limb stance for 30 seconds with good ankle strategy in order to return to playing soccer once a week. (Initial, not met)    PLAN   Plan of care Certification: 10/20/2022 to 12/10/2022.    Outpatient Physical Therapy 2 times weekly for 6 weeks to include the following interventions: Cervical/Lumbar Traction, Electrical Stimulation dry needling, re-eval, Gait Training, Manual Therapy, Moist Heat/ Ice, Neuromuscular Re-ed, Patient Education, Self Care, Therapeutic Activities, Therapeutic Exercise, and Ultrasound.     Physical therapist and physical therapy assistant(s) met face to face to discuss patient's treatment plan and progress towards established goals. Pt will be seen by a physical therapist minimally every 6th visit or every 30 days.    Student physical therapist, Lauren Greenberg, participated in treatment session under direct supervision of licensed physical therapist    Nina Hagen, PT      I CERTIFY THE NEED FOR THESE SERVICES FURNISHED UNDER THIS PLAN OF TREATMENT AND WHILE UNDER MY CARE   Physician's comments:     Physician's Signature: ___________________________________________________

## 2022-10-25 NOTE — PROGRESS NOTES
"[facility]   Physical Therapy Treatment Note     Name: Erich Chery  Clinic Number: [id number]    Therapy Diagnosis:   Encounter Diagnoses   Name Primary?    Left ankle pain, unspecified chronicity Yes    Difficulty walking      Physician: Nicolas Castanon DPM    Visit Date: 10/26/2022  Physician Orders: PT Eval and Treat   Medical Diagnosis from Referral: M76.62 (ICD-10-CM) - Achilles tendinitis of left lower extremity M24.573 (ICD-10-CM) - Equinus contracture of ankle S96.912A (ICD-10-CM) - Strain of ankle and foot, left, initial encounter   Evaluation Date: 10/20/2022  Authorization Period Expiration: 12/31/2022  Plan of Care Expiration: 12/20/2022  Visit # / Visits authorized: 1/12  FOTO: 1/3 - 10/20/2022     Precautions: Standard     Time In: 11:06 am  Time Out: 12:03 pm  Total Billable Time: 57 minutes      SUBJECTIVE     Pt reports: he has been feeling some soreness in his L achilles tendon but overall it has been feeling better. 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  He was compliant with home exercise program half of the time  Response to previous treatment: Patient states he felt fine after previous treatment.  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       TREATMENT   Charges based on 1-1 tx:    Erich received the treatments listed below:      therapeutic exercises to develop strength, endurance, ROM, flexibility, posture, and core stabilization for 24 minutes including:  Bilateral calf raises off 6" step; focus on eccentric control down, 2x20  L DF mobilization lunges with L foot forward, 10" x 10  Bilateral gastroc stretch on incline, 30"x4  +L SL weight shifts w heel on ground touching nose to wall, " "3x10  +sitting L heel raises with 29# weight box on leg, focus on eccentric control - 30x  +Shuttle L single leg heel raises with eccentric lowering, 50#, 3x10  +Shuttle L single leg squats, 75#, 3x10     manual therapy techniques were applied or 15 minutes, including:  L A/P TCJ Gr III/IV JM  L forefoot inversion/eversion Gr II/III JM  L Talocrural distraction  L mobilization with movement for ankle impingement with dark purple resistance band, 10"x10     neuromuscular re-education activities to improve: Balance, Coordination, Kinesthetic, Sense, Proprioception, and Posture for 10 minutes. The following activities were included:  +L SL stance on airex with cone tapping, 3x10  +R/L Bosu steps overs, 3x10 ea     therapeutic activities to improve functional performance for 8 minutes, including:  treadmill walking and jogging intervals  + L SL squats from chair, 2x15  +L SL with R ankle sliding 3 way with furniture slider, x20      PATIENT EDUCATION AND HOME EXERCISES     Home Exercises Provided and Patient Education Provided     Education provided:   -exercise form and rationale     Written Home Exercises Provided: Patient instructed to cont prior HEP. Exercises were reviewed and Erich was able to demonstrate them prior to the end of the session.  Erich demonstrated good  understanding of the education provided. See EMR under Patient Instructions for exercises provided during therapy sessions    ASSESSMENT   Pt tolerated tx session well today and completed all therapeutic exercises with minimal/no increase in L achilles pain. Therapist initiated heel raises with eccentric loading in multiple positions with moderate ankle shaking and subjective reports of mild fatigue at the end of exercise repetitions. During manual therapy, therapist noted hypomobility with talocrural joint A/P glides that improved with Gr III/IV JM. Therapist also noted stiffness and a pop in the anterior ankle with talocrural distraction joint " mobilization. Patient continues to have difficulty with dynamic L single leg balance as evidenced by 2 instances of LOB while standing on L leg on airex while tapping cones with R. Plan to continue with L SL stability exercises and initiate treadmill running activities in order to address functional deficits with playing in a soccer game.    Erich Is progressing well towards his goals.   Pt prognosis is Good.     Pt will continue to benefit from skilled outpatient physical therapy to address the deficits listed in the problem list box on initial evaluation, provide pt/family education and to maximize pt's level of independence in the home and community environment.     Pt's spiritual, cultural and educational needs considered and pt agreeable to plan of care and goals.     Anticipated barriers to physical therapy: None    Goals:  Short Term Goals: In 3 weeks:  Patient will be independent with home exercise program (progressing, not met)    Patient will improve L ankle DF ROM to 15 degrees in order to navigate 15 stairs to get into the home (progressing, not met)    Patient will be able to perform 20 L single leg heel raises with good eccentric control in order to ski for a full day (progressing, not met)       Long Term Goals: In 6 weeks:  Patient will improve L ankle DF ROM to 20 degrees in order to run 300 yards. (progressing, not met)    Patient will improve L ankle Eversion ROM to 15 degrees in order to return to kicking a ball 10 times during a soccer match (progressing, not met)    Patient will be able to perform L single limb stance for 30 seconds with good ankle strategy in order to return to playing soccer once a week. (progressing, not met)      PLAN   Plan of care Certification: 10/20/2022 to 12/10/2022.     Outpatient Physical Therapy 2 times weekly for 6 weeks to include the following interventions: Cervical/Lumbar Traction, Electrical Stimulation dry needling, re-eval, Gait Training, Manual Therapy,  Moist Heat/ Ice, Neuromuscular Re-ed, Patient Education, Self Care, Therapeutic Activities, Therapeutic Exercise, and Ultrasound.      Physical therapist and physical therapy assistant(s) met face to face to discuss patient's treatment plan and progress towards established goals. Pt will be seen by a physical therapist minimally every 6th visit or every 30 days.     Student physical therapist, Lauren Greenberg, participated in treatment session under direct supervision of licensed physical therapist      Sophy Smith, PT

## 2022-10-26 ENCOUNTER — CLINICAL SUPPORT (OUTPATIENT)
Dept: REHABILITATION | Facility: OTHER | Age: 52
End: 2022-10-26
Payer: COMMERCIAL

## 2022-10-26 DIAGNOSIS — M25.572 LEFT ANKLE PAIN, UNSPECIFIED CHRONICITY: Primary | ICD-10-CM

## 2022-10-26 DIAGNOSIS — R26.2 DIFFICULTY WALKING: ICD-10-CM

## 2022-10-26 PROCEDURE — 97110 THERAPEUTIC EXERCISES: CPT | Mod: PN

## 2022-10-26 PROCEDURE — 97112 NEUROMUSCULAR REEDUCATION: CPT | Mod: PN,97

## 2022-10-26 PROCEDURE — 97140 MANUAL THERAPY 1/> REGIONS: CPT | Mod: PN,97

## 2022-10-26 PROCEDURE — 97530 THERAPEUTIC ACTIVITIES: CPT | Mod: PN,97

## 2022-10-31 ENCOUNTER — CLINICAL SUPPORT (OUTPATIENT)
Dept: REHABILITATION | Facility: OTHER | Age: 52
End: 2022-10-31
Payer: COMMERCIAL

## 2022-10-31 DIAGNOSIS — M25.572 LEFT ANKLE PAIN, UNSPECIFIED CHRONICITY: Primary | ICD-10-CM

## 2022-10-31 DIAGNOSIS — R26.2 DIFFICULTY WALKING: ICD-10-CM

## 2022-10-31 PROCEDURE — 97110 THERAPEUTIC EXERCISES: CPT | Mod: PN

## 2022-10-31 PROCEDURE — 97112 NEUROMUSCULAR REEDUCATION: CPT | Mod: PN,97

## 2022-10-31 PROCEDURE — 97140 MANUAL THERAPY 1/> REGIONS: CPT | Mod: PN,97

## 2022-10-31 NOTE — PROGRESS NOTES
"[facility]   Physical Therapy Treatment Note     Name: Erich Chery  Clinic Number: [id number]    Therapy Diagnosis:   Encounter Diagnoses   Name Primary?    Left ankle pain, unspecified chronicity Yes    Difficulty walking      Physician: Nicolas Castanon DPM    Visit Date: 10/31/2022  Physician Orders: PT Eval and Treat   Medical Diagnosis from Referral: M76.62 (ICD-10-CM) - Achilles tendinitis of left lower extremity M24.573 (ICD-10-CM) - Equinus contracture of ankle S96.912A (ICD-10-CM) - Strain of ankle and foot, left, initial encounter   Evaluation Date: 10/20/2022  Authorization Period Expiration: 12/31/2022  Plan of Care Expiration: 12/20/2022  Visit # / Visits authorized: 2/12  FOTO: 1/3 - 10/20/2022     Precautions: Standard     Time In: 1:10 pm   Time Out: 2:04 pm  Total Billable Time:54 minutes      SUBJECTIVE     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 Patient states his achilles really only bothers him on days where he is on his feet for long periods of time.  He was compliant with home exercise program half of the time  Response to previous treatment: Patient states he felt fine after previous treatment. Patient reports his L ankle is feeling looser than the R after last treatment session.  Functional change: able to jog more often    Pain: 1/10  Location: left achilles tendon     OBJECTIVE     Objective Measures updated at progress report unless specified.       TREATMENT   Charges based on 1-1 tx:    Erich received the treatments listed below:      therapeutic exercises to develop strength, endurance, ROM, flexibility, posture, and core stabilization for 21 minutes including:  Bilateral calf raises off 6" step; focus on eccentric control down, 2x20  L DF mobilization lunges with L foot forward, 10" x 10  Bilateral gastroc stretch on incline, 30"x4  L SL " "weight shifts w heel on ground touching nose to wall, 3x10  sitting L heel raises with 29# weight box on leg, focus on eccentric control - 30x  Shuttle L single leg heel raises with eccentric lowering, 50#, 3x10  Shuttle L single leg squats, 75#, 3x10  +marble pick ups x 2 rounds     manual therapy techniques were applied or 10 minutes, including:  L A/P TCJ Gr III/IV JM  L forefoot inversion/eversion Gr II/III JM  L Talocrural distraction  L mobilization with movement for ankle impingement with dark purple resistance band, 10"x10     neuromuscular re-education activities to improve: Balance, Coordination, Kinesthetic, Sense, Proprioception, and Posture for 8 minutes. The following activities were included:  L SL stance on airex with cone tapping, 2x10  R/L Bosu steps overs, 3x10 ea     therapeutic activities to improve functional performance for 15 minutes, including:  +walking for 1 min at 3.5 mph and jogging for 45 seconds at 5.3 on treadmill, 6 intervals  L SL squats from chair, 2x15  L SL with R ankle sliding 3 way with furniture slider, x20      PATIENT EDUCATION AND HOME EXERCISES     Home Exercises Provided and Patient Education Provided     Education provided:   -exercise form and rationale     Written Home Exercises Provided: Patient instructed to cont prior HEP. Exercises were reviewed and Erich was able to demonstrate them prior to the end of the session.  Erich demonstrated good  understanding of the education provided. See EMR under Patient Instructions for exercises provided during therapy sessions    ASSESSMENT   Patient completed treatment session with increased tolerance to single leg activities and fair ankle strategy. Therapist initiated treadmill jogging intervals with appropriate challenge and subjective reports of tightness and fatigue in L achilles post jogging. Therapist noted L outtoeing and decreased heel strike while jogging on treadmill. Patient demonstrates improved L ankle ROM with " single leg heel raises and decreased hypomobility was noted during A/P talocrural joint glides. Plan to continue with eccentric loading on L achilles and initiate foot intrinsic strengthening in order to address functional deficits with running.    Erich Is progressing well towards his goals.   Pt prognosis is Good.     Pt will continue to benefit from skilled outpatient physical therapy to address the deficits listed in the problem list box on initial evaluation, provide pt/family education and to maximize pt's level of independence in the home and community environment.     Pt's spiritual, cultural and educational needs considered and pt agreeable to plan of care and goals.     Anticipated barriers to physical therapy: None    Goals:  Short Term Goals: In 3 weeks:  Patient will be independent with home exercise program (progressing, not met)    Patient will improve L ankle DF ROM to 15 degrees in order to navigate 15 stairs to get into the home (progressing, not met)    Patient will be able to perform 20 L single leg heel raises with good eccentric control in order to ski for a full day (progressing, not met)       Long Term Goals: In 6 weeks:  Patient will improve L ankle DF ROM to 20 degrees in order to run 300 yards. (progressing, not met)    Patient will improve L ankle Eversion ROM to 15 degrees in order to return to kicking a ball 10 times during a soccer match (progressing, not met)    Patient will be able to perform L single limb stance for 30 seconds with good ankle strategy in order to return to playing soccer once a week. (progressing, not met)      PLAN   Plan of care Certification: 10/20/2022 to 12/10/2022.     Outpatient Physical Therapy 2 times weekly for 6 weeks to include the following interventions: Cervical/Lumbar Traction, Electrical Stimulation dry needling, re-eval, Gait Training, Manual Therapy, Moist Heat/ Ice, Neuromuscular Re-ed, Patient Education, Self Care, Therapeutic Activities,  Therapeutic Exercise, and Ultrasound.      Physical therapist and physical therapy assistant(s) met face to face to discuss patient's treatment plan and progress towards established goals. Pt will be seen by a physical therapist minimally every 6th visit or every 30 days.     Student physical therapist, Lauren Greenberg, participated in treatment session under direct supervision of licensed physical therapist    Sophy Smith, PT

## 2022-11-02 ENCOUNTER — CLINICAL SUPPORT (OUTPATIENT)
Dept: REHABILITATION | Facility: OTHER | Age: 52
End: 2022-11-02
Payer: COMMERCIAL

## 2022-11-02 DIAGNOSIS — R26.2 DIFFICULTY WALKING: ICD-10-CM

## 2022-11-02 DIAGNOSIS — M25.572 LEFT ANKLE PAIN, UNSPECIFIED CHRONICITY: Primary | ICD-10-CM

## 2022-11-02 PROCEDURE — 97110 THERAPEUTIC EXERCISES: CPT | Mod: PN,97

## 2022-11-02 PROCEDURE — 97140 MANUAL THERAPY 1/> REGIONS: CPT | Mod: PN,97

## 2022-11-02 PROCEDURE — 97530 THERAPEUTIC ACTIVITIES: CPT | Mod: PN,97

## 2022-11-02 NOTE — PROGRESS NOTES
"  [facility]   Physical Therapy Treatment Note     Name: Erich Chery  Clinic Number: [id number]    Therapy Diagnosis:   Encounter Diagnoses   Name Primary?    Left ankle pain, unspecified chronicity Yes    Difficulty walking        Physician: Nicolas Castanon DPM    Visit Date: 11/2/2022  Physician Orders: PT Eval and Treat   Medical Diagnosis from Referral: M76.62 (ICD-10-CM) - Achilles tendinitis of left lower extremity M24.573 (ICD-10-CM) - Equinus contracture of ankle S96.912A (ICD-10-CM) - Strain of ankle and foot, left, initial encounter   Evaluation Date: 10/20/2022  Authorization Period Expiration: 12/31/2022  Plan of Care Expiration: 12/20/2022  Visit # / Visits authorized: 3/12  FOTO: 1/3 - 10/20/2022     Precautions: Standard     Time In: 1010 am  Time Out: 1100 am  Total Billable Time: 50 minutes      SUBJECTIVE     Pt reports: 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  He was compliant with home exercise program half of the time  Response to previous treatment: Patient states he felt fine after previous treatment. Patient reports his L ankle is feeling looser than the R after last treatment session.  Functional change: able to jog more often    Pain: 1/10  Location: left achilles tendon     OBJECTIVE     Objective Measures updated at progress report unless specified.       TREATMENT   Charges based on 1-1 tx:    Erich received the treatments listed below:      therapeutic exercises to develop strength, endurance, ROM, flexibility, posture, and core stabilization for 25 minutes including:  Bilateral calf raises off 6" step; focus on eccentric control down, 2x20  L DF mobilization lunges with L foot forward, 10" x 10  Bilateral gastroc stretch on incline, 30"x4  L SL weight shifts w heel on ground touching nose to wall, 3x10  sitting L heel raises with 29# weight box on leg, focus on eccentric control - " "30x  Shuttle L single leg heel raises with eccentric lowering, 50#, 3x10  Shuttle double leg squats 100# 2x20  Shuttle L single leg squats, 75#, 3x10  +marble pick ups x 2 rounds  Supine hamstring stretch with strap forward/bilateral sides 3x30 seconds   Split squat with back lower extremity on BOSU 2x10  SLS on BOSU (flat) with hip abduction 2x10     manual therapy techniques were applied or 10 minutes, including:  L A/P TCJ Gr III/IV JM  L forefoot inversion/eversion Gr II/III JM  L Talocrural distraction  L mobilization with movement for ankle impingement with dark purple resistance band, 10"x10     neuromuscular re-education activities to improve: Balance, Coordination, Kinesthetic, Sense, Proprioception, and Posture for 0 minutes. The following activities were included:    L SL stance on airex with cone tapping, 2x10  R/L Bosu steps overs, 3x10 ea    therapeutic activities to improve functional performance for 12 minutes, including:  +walking for 2 min at 3.5 mph and jogging for 1.5 mins at 5.3 on treadmill, 4 intervals (12 minutes)  L SL squats from chair, 2x15  L SL with R ankle sliding 3 way with furniture slider, x20      PATIENT EDUCATION AND HOME EXERCISES     Home Exercises Provided and Patient Education Provided     Education provided:   -exercise form and rationale     Written Home Exercises Provided: Patient instructed to cont prior HEP. Exercises were reviewed and Erich was able to demonstrate them prior to the end of the session.  Erich demonstrated good  understanding of the education provided. See EMR under Patient Instructions for exercises provided during therapy sessions    ASSESSMENT   Pt tolerated therapeutic interventions well with no complaint of increased pain. Patient reported relief after physical therapy treatment today.      Erich Is progressing well towards his goals.   Pt prognosis is Good.     Pt will continue to benefit from skilled outpatient physical therapy to address the deficits " listed in the problem list box on initial evaluation, provide pt/family education and to maximize pt's level of independence in the home and community environment.     Pt's spiritual, cultural and educational needs considered and pt agreeable to plan of care and goals.     Anticipated barriers to physical therapy: None    Goals:  Short Term Goals: In 3 weeks:  Patient will be independent with home exercise program (progressing, not met)    Patient will improve L ankle DF ROM to 15 degrees in order to navigate 15 stairs to get into the home (progressing, not met)    Patient will be able to perform 20 L single leg heel raises with good eccentric control in order to ski for a full day (progressing, not met)       Long Term Goals: In 6 weeks:  Patient will improve L ankle DF ROM to 20 degrees in order to run 300 yards. (progressing, not met)    Patient will improve L ankle Eversion ROM to 15 degrees in order to return to kicking a ball 10 times during a soccer match (progressing, not met)    Patient will be able to perform L single limb stance for 30 seconds with good ankle strategy in order to return to playing soccer once a week. (progressing, not met)      PLAN   Plan of care Certification: 10/20/2022 to 12/10/2022.     Outpatient Physical Therapy 2 times weekly for 6 weeks to include the following interventions: Cervical/Lumbar Traction, Electrical Stimulation dry needling, re-eval, Gait Training, Manual Therapy, Moist Heat/ Ice, Neuromuscular Re-ed, Patient Education, Self Care, Therapeutic Activities, Therapeutic Exercise, and Ultrasound.      Physical therapist and physical therapy assistant(s) met face to face to discuss patient's treatment plan and progress towards established goals. Pt will be seen by a physical therapist minimally every 6th visit or every 30 days.     Student physical therapist, Lauren Greenberg, participated in treatment session under direct supervision of licensed physical  therapist    David Ferraro, PT

## 2022-11-07 ENCOUNTER — CLINICAL SUPPORT (OUTPATIENT)
Dept: REHABILITATION | Facility: OTHER | Age: 52
End: 2022-11-07
Payer: COMMERCIAL

## 2022-11-07 DIAGNOSIS — M25.572 LEFT ANKLE PAIN, UNSPECIFIED CHRONICITY: Primary | ICD-10-CM

## 2022-11-07 DIAGNOSIS — R26.2 DIFFICULTY WALKING: ICD-10-CM

## 2022-11-07 PROCEDURE — 97530 THERAPEUTIC ACTIVITIES: CPT | Mod: PN,97

## 2022-11-07 PROCEDURE — 97112 NEUROMUSCULAR REEDUCATION: CPT | Mod: PN

## 2022-11-07 PROCEDURE — 97110 THERAPEUTIC EXERCISES: CPT | Mod: PN

## 2022-11-07 NOTE — PROGRESS NOTES
"  [facility]   Physical Therapy Treatment Note     Name: Erich Chery  Clinic Number: [id number]    Therapy Diagnosis:   Encounter Diagnoses   Name Primary?    Left ankle pain, unspecified chronicity Yes    Difficulty walking        Physician: Nicolas Castanon DPM    Visit Date: 11/7/2022  Physician Orders: PT Eval and Treat   Medical Diagnosis from Referral: M76.62 (ICD-10-CM) - Achilles tendinitis of left lower extremity M24.573 (ICD-10-CM) - Equinus contracture of ankle S96.912A (ICD-10-CM) - Strain of ankle and foot, left, initial encounter   Evaluation Date: 10/20/2022  Authorization Period Expiration: 12/31/2022  Plan of Care Expiration: 12/20/2022  Visit # / Visits authorized: 4/12  FOTO: 1/3 - 10/20/2022     Precautions: Standard     Time In: 1:10 pm  Time Out: 2:03 pm  Total Billable Time: 53 minutes      SUBJECTIVE     Pt reports: his L achilles is feeling about the same but he does notice a decrease in soreness after hard workouts or activities.  He was compliant with home exercise program half of the time  Response to previous treatment: Patient states he felt slightly sore after last treatment but overall his achilles is feeling pretty good.   Functional change: able to stand for prolonged periods of time    Pain: 1/10  Location: left achilles tendon     OBJECTIVE     Objective Measures updated at progress report unless specified.       TREATMENT   Charges based on 1-1 tx:    Erich received the treatments listed below:      therapeutic exercises to develop strength, endurance, ROM, flexibility, posture, and core stabilization for 30 minutes including:  Bilateral calf raises off 6" step; focus on eccentric control down, 2x20  L DF mobilization lunges with L foot forward, 10" x 10  Bilateral gastroc stretch on incline, 30"x4  L SL weight shifts w heel on ground touching nose to wall, 3x10  Shuttle L single leg heel raises with eccentric lowering, 75#, 3x10  Shuttle double " "leg squats 125# 2x20  Shuttle L single leg squats,verbal cueing for eccentric lowering, 75#, 3x10  marble pick ups x 2 rounds  Supine hamstring stretch with strap forward/bilateral sides 3x30 seconds   R/L Split squat with back lower extremity on BOSU 2x10  SLS on BOSU (flat) with hip abduction 2x10     manual therapy techniques were applied or 00 minutes, including:  L A/P TCJ Gr III/IV JM  L forefoot inversion/eversion Gr II/III JM  L Talocrural distraction  L mobilization with movement for ankle impingement with dark purple resistance band, 10"x10     neuromuscular re-education activities to improve: Balance, Coordination, Kinesthetic, Sense, Proprioception, and Posture for 8 minutes. The following activities were included:  L SL stance on airex with cone tapping, 2x15  R/L Bosu steps overs, 3x10 ea  +L SL squats on Bosu, 3x10    therapeutic activities to improve functional performance for 15 minutes, including:  walking for 1 min at 3.5 mph and jogging for 1.5 mins at 5.3 on treadmill, 4 intervals (11.5 minutes)  L SL squats from chair, 2x15  L SL with R ankle sliding 3 way with furniture slider, x20  +drinking birds with 15# KB, 40 ftx1      PATIENT EDUCATION AND HOME EXERCISES     Home Exercises Provided and Patient Education Provided     Education provided:   -exercise form and rationale     Written Home Exercises Provided: Patient instructed to cont prior HEP. Exercises were reviewed and Erich was able to demonstrate them prior to the end of the session.  Erich demonstrated good  understanding of the education provided. See EMR under Patient Instructions for exercises provided during therapy sessions    ASSESSMENT   Patient completed treatment session today with good L ankle strategy and good tolerance to progressed weight with single leg strengthening exercises. Therapist initiated L SL BOSU squats with mild shaking at the L knee and good eccentric control of quadriceps when coming down into a squat. Patient " demonstrated improvements with L single limb stance on airex with cone tapping with decreased L ankle shaking and decreased fatigue and stiffness at termination of exercise. Plan to continue to progress dynamic and L single leg exercises in order to simulate soccer activities.    Erich Is progressing well towards his goals.   Pt prognosis is Good.     Pt will continue to benefit from skilled outpatient physical therapy to address the deficits listed in the problem list box on initial evaluation, provide pt/family education and to maximize pt's level of independence in the home and community environment.     Pt's spiritual, cultural and educational needs considered and pt agreeable to plan of care and goals.     Anticipated barriers to physical therapy: None    Goals:  Short Term Goals: In 3 weeks:  Patient will be independent with home exercise program (progressing, not met)    Patient will improve L ankle DF ROM to 15 degrees in order to navigate 15 stairs to get into the home (progressing, not met)    Patient will be able to perform 20 L single leg heel raises with good eccentric control in order to ski for a full day (progressing, not met)       Long Term Goals: In 6 weeks:  Patient will improve L ankle DF ROM to 20 degrees in order to run 300 yards. (progressing, not met)    Patient will improve L ankle Eversion ROM to 15 degrees in order to return to kicking a ball 10 times during a soccer match (progressing, not met)    Patient will be able to perform L single limb stance for 30 seconds with good ankle strategy in order to return to playing soccer once a week. (progressing, not met)      PLAN   Plan of care Certification: 10/20/2022 to 12/10/2022.     Outpatient Physical Therapy 2 times weekly for 6 weeks to include the following interventions: Cervical/Lumbar Traction, Electrical Stimulation dry needling, re-eval, Gait Training, Manual Therapy, Moist Heat/ Ice, Neuromuscular Re-ed, Patient Education, Self  Care, Therapeutic Activities, Therapeutic Exercise, and Ultrasound.      Physical therapist and physical therapy assistant(s) met face to face to discuss patient's treatment plan and progress towards established goals. Pt will be seen by a physical therapist minimally every 6th visit or every 30 days.     Student physical therapist, Lauren Greenberg, participated in treatment session under direct supervision of licensed physical therapist    Sophy Smith, PT

## 2022-11-15 ENCOUNTER — CLINICAL SUPPORT (OUTPATIENT)
Dept: REHABILITATION | Facility: OTHER | Age: 52
End: 2022-11-15
Payer: COMMERCIAL

## 2022-11-15 DIAGNOSIS — R26.2 DIFFICULTY WALKING: Primary | ICD-10-CM

## 2022-11-15 PROCEDURE — 97530 THERAPEUTIC ACTIVITIES: CPT | Mod: PN,CQ,97

## 2022-11-15 PROCEDURE — 97112 NEUROMUSCULAR REEDUCATION: CPT | Mod: PN,CQ

## 2022-11-15 PROCEDURE — 97110 THERAPEUTIC EXERCISES: CPT | Mod: PN,CQ,97

## 2022-11-15 NOTE — PROGRESS NOTES
"  [facility]   Physical Therapy Treatment Note     Name: Erich Chery  Clinic Number: [id number]    Therapy Diagnosis:   Encounter Diagnosis   Name Primary?    Difficulty walking Yes         Physician: Nicolas Castanon DPM    Visit Date: 11/15/2022  Physician Orders: PT Eval and Treat   Medical Diagnosis from Referral: M76.62 (ICD-10-CM) - Achilles tendinitis of left lower extremity M24.573 (ICD-10-CM) - Equinus contracture of ankle S96.912A (ICD-10-CM) - Strain of ankle and foot, left, initial encounter   Evaluation Date: 10/20/2022  Authorization Period Expiration: 12/31/2022  Plan of Care Expiration: 12/20/2022  Visit # / Visits authorized: 5/12  FOTO: 1/3 - 10/20/2022     Precautions: Standard     Time In: 1000  Time Out: 1100  Total Billable Time: 60 minutes      SUBJECTIVE     Pt reports: his L achilles is feeling well today. No pain reported. Has not been participating in any high levels activities ie; running soccer etc   He was compliant with home exercise program half of the time  Response to previous treatment: Patient states he felt slightly sore after last treatment but overall his achilles is feeling pretty good.   Functional change: able to stand for prolonged periods of time    Pain: 0/10  Location: left achilles tendon     OBJECTIVE     Objective Measures updated at progress report unless specified.       TREATMENT   Charges based on 1-1 tx:    Erich received the treatments listed below:      therapeutic exercises to develop strength, endurance, ROM, flexibility, posture, and core stabilization for 27 minutes including:  +Elliptical x6 min, lvl 2.0  Bilateral calf raises off 6" step; focus on eccentric control down, 2x20  L DF mobilization lunges with L foot forward, 10" x 10  Bilateral gastroc stretch on incline, 30"x4  Shuttle L single leg heel raises with eccentric lowering, 75#, 2x20  Shuttle double leg squats 125# 2x20  Shuttle L single leg squats,verbal cueing " "for eccentric lowering, 75#, 2x20  R/L Split squat with back lower extremity on BOSU 2x10  R/L SLS on BOSU (flat) with hip abduction 2x10     manual therapy techniques were applied or 00 minutes, including:  L A/P TCJ Gr III/IV JM  L forefoot inversion/eversion Gr II/III JM  L Talocrural distraction  L mobilization with movement for ankle impingement with dark purple resistance band, 10"x10     neuromuscular re-education activities to improve: Balance, Coordination, Kinesthetic, Sense, Proprioception, and Posture for 10 minutes. The following activities were included:  L SL stance on airex, 15# KB in RUE with cone tapping, x20  R/L Bosu steps overs, 3x10 ea  L SL squats on Bosu, 3x10  +Heel/toe walking on balance beam, 6 laps    therapeutic activities to improve functional performance for 23 minutes, including:  walking for 1 min at 3.5 mph and jogging for 1.5 mins at 5.3 on treadmill, 4 intervals (11.5 minutes)  L SL squats from chair, 2x15  +Walking heel raises, 15# KB, 40ftx2  +Sit to stand with 6# DB, feet on airex, 2x20 ==> 15# KB next visit  L SL with R ankle sliding 3 way with furniture slider, x20  Drinking birds with 15# KB, 40 ftx2  +Stability chair ankle press, 4 springs, 2x20  +Ball toss at rebounder, airex, 30x F/L      PATIENT EDUCATION AND HOME EXERCISES     Home Exercises Provided and Patient Education Provided     Education provided:   -exercise form and rationale     Written Home Exercises Provided: Patient instructed to cont prior HEP. Exercises were reviewed and Erich was able to demonstrate them prior to the end of the session.  Erich demonstrated good  understanding of the education provided. See EMR under Patient Instructions for exercises provided during therapy sessions    ASSESSMENT   Pt tolerated exercise well. Pt was able to complete documented exercises with minimal complaints of increased pain/discomfort beyond stated levels. Some muscle weakness in peroneals and plantarflexors continues " to remain notable at this time however muscular endurance appears to be improving. Added balance/proprioception and dynamic CKC exercises for improved ankle strategy. stability.     Erich Is progressing well towards his goals.   Pt prognosis is Good.     Pt will continue to benefit from skilled outpatient physical therapy to address the deficits listed in the problem list box on initial evaluation, provide pt/family education and to maximize pt's level of independence in the home and community environment.     Pt's spiritual, cultural and educational needs considered and pt agreeable to plan of care and goals.     Anticipated barriers to physical therapy: None    Goals:  Short Term Goals: In 3 weeks:  Patient will be independent with home exercise program (progressing, not met)    Patient will improve L ankle DF ROM to 15 degrees in order to navigate 15 stairs to get into the home (progressing, not met)    Patient will be able to perform 20 L single leg heel raises with good eccentric control in order to ski for a full day (progressing, not met)       Long Term Goals: In 6 weeks:  Patient will improve L ankle DF ROM to 20 degrees in order to run 300 yards. (progressing, not met)    Patient will improve L ankle Eversion ROM to 15 degrees in order to return to kicking a ball 10 times during a soccer match (progressing, not met)    Patient will be able to perform L single limb stance for 30 seconds with good ankle strategy in order to return to playing soccer once a week. (progressing, not met)      PLAN   Plan of care Certification: 10/20/2022 to 12/10/2022.     Outpatient Physical Therapy 2 times weekly for 6 weeks to include the following interventions: Cervical/Lumbar Traction, Electrical Stimulation dry needling, re-eval, Gait Training, Manual Therapy, Moist Heat/ Ice, Neuromuscular Re-ed, Patient Education, Self Care, Therapeutic Activities, Therapeutic Exercise, and Ultrasound.      Physical therapist and  physical therapy assistant(s) met face to face to discuss patient's treatment plan and progress towards established goals. Pt will be seen by a physical therapist minimally every 6th visit or every 30 days.     Derrick Agudelo, PTA

## 2022-11-16 ENCOUNTER — OFFICE VISIT (OUTPATIENT)
Dept: PODIATRY | Facility: CLINIC | Age: 52
End: 2022-11-16
Payer: COMMERCIAL

## 2022-11-16 VITALS
HEIGHT: 73 IN | WEIGHT: 239 LBS | BODY MASS INDEX: 31.68 KG/M2 | DIASTOLIC BLOOD PRESSURE: 79 MMHG | SYSTOLIC BLOOD PRESSURE: 152 MMHG | HEART RATE: 71 BPM

## 2022-11-16 DIAGNOSIS — M24.573 EQUINUS CONTRACTURE OF ANKLE: ICD-10-CM

## 2022-11-16 DIAGNOSIS — S96.912A STRAIN OF ANKLE AND FOOT, LEFT, INITIAL ENCOUNTER: ICD-10-CM

## 2022-11-16 DIAGNOSIS — M79.672 FOOT PAIN, LEFT: ICD-10-CM

## 2022-11-16 DIAGNOSIS — M76.62 ACHILLES TENDINITIS OF LEFT LOWER EXTREMITY: Primary | ICD-10-CM

## 2022-11-16 DIAGNOSIS — M72.2 PLANTAR FASCIITIS: ICD-10-CM

## 2022-11-16 PROCEDURE — 3008F BODY MASS INDEX DOCD: CPT | Mod: CPTII,S$GLB,, | Performed by: PODIATRIST

## 2022-11-16 PROCEDURE — 3078F DIAST BP <80 MM HG: CPT | Mod: CPTII,S$GLB,, | Performed by: PODIATRIST

## 2022-11-16 PROCEDURE — 99212 OFFICE O/P EST SF 10 MIN: CPT | Mod: S$GLB,,, | Performed by: PODIATRIST

## 2022-11-16 PROCEDURE — 3078F PR MOST RECENT DIASTOLIC BLOOD PRESSURE < 80 MM HG: ICD-10-PCS | Mod: CPTII,S$GLB,, | Performed by: PODIATRIST

## 2022-11-16 PROCEDURE — 3077F SYST BP >= 140 MM HG: CPT | Mod: CPTII,S$GLB,, | Performed by: PODIATRIST

## 2022-11-16 PROCEDURE — 99999 PR PBB SHADOW E&M-EST. PATIENT-LVL III: ICD-10-PCS | Mod: PBBFAC,,, | Performed by: PODIATRIST

## 2022-11-16 PROCEDURE — 3044F HG A1C LEVEL LT 7.0%: CPT | Mod: CPTII,S$GLB,, | Performed by: PODIATRIST

## 2022-11-16 PROCEDURE — 1159F PR MEDICATION LIST DOCUMENTED IN MEDICAL RECORD: ICD-10-PCS | Mod: CPTII,S$GLB,, | Performed by: PODIATRIST

## 2022-11-16 PROCEDURE — 99212 PR OFFICE/OUTPT VISIT, EST, LEVL II, 10-19 MIN: ICD-10-PCS | Mod: S$GLB,,, | Performed by: PODIATRIST

## 2022-11-16 PROCEDURE — 3044F PR MOST RECENT HEMOGLOBIN A1C LEVEL <7.0%: ICD-10-PCS | Mod: CPTII,S$GLB,, | Performed by: PODIATRIST

## 2022-11-16 PROCEDURE — 3077F PR MOST RECENT SYSTOLIC BLOOD PRESSURE >= 140 MM HG: ICD-10-PCS | Mod: CPTII,S$GLB,, | Performed by: PODIATRIST

## 2022-11-16 PROCEDURE — 3008F PR BODY MASS INDEX (BMI) DOCUMENTED: ICD-10-PCS | Mod: CPTII,S$GLB,, | Performed by: PODIATRIST

## 2022-11-16 PROCEDURE — 99999 PR PBB SHADOW E&M-EST. PATIENT-LVL III: CPT | Mod: PBBFAC,,, | Performed by: PODIATRIST

## 2022-11-16 PROCEDURE — 1159F MED LIST DOCD IN RCRD: CPT | Mod: CPTII,S$GLB,, | Performed by: PODIATRIST

## 2022-11-16 NOTE — PROGRESS NOTES
Subjective:      Patient ID: Erich Chery is a 52 y.o. male.    Chief Complaint: Follow-up (Achilles issue)    sharp pain a burning stinging sometimes sharp in nature in the back of the left foot/ankle rapid onset with a mild strain plain soccer back in January without complete resolution since.  Stretches NSAIDs inserts activity modification of not resolved the problem.  PT helping well.  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  Still intermittent pain.    Review of Systems   Constitutional: Negative for chills, diaphoresis, fever, malaise/fatigue and night sweats.   Cardiovascular:  Negative for claudication, cyanosis, leg swelling and syncope.   Skin:  Negative for color change, dry skin, nail changes, rash, suspicious lesions and unusual hair distribution.   Musculoskeletal:  Negative for falls, joint pain, joint swelling, muscle cramps, muscle weakness and stiffness.   Gastrointestinal:  Negative for constipation, diarrhea, nausea and vomiting.   Neurological:  Negative for brief paralysis, disturbances in coordination, focal weakness, numbness, paresthesias, sensory change and tremors.         Objective:      Physical Exam  Constitutional:       General: He is not in acute distress.     Appearance: He is well-developed. He is not diaphoretic.   Cardiovascular:      Pulses:           Popliteal pulses are 2+ on the right side and 2+ on the left side.        Dorsalis pedis pulses are 2+ on the right side and 2+ on the left side.        Posterior tibial pulses are 2+ on the right side and 2+ on the left side.      Comments: Capillary refill 3 seconds all toes/distal feet, all toes/both feet warm to touch.      Negative lymphadenopathy bilateral popliteal fossa and tarsal tunnel.      Negavie lower extremity edema bilateral.    Musculoskeletal:      Right ankle: No swelling, deformity, ecchymosis or lacerations. Normal range of motion. Normal pulse.      Right Achilles Tendon: Normal. No  defects. Almonte's test negative.      Comments: Minimal residual pain to deep palpation the mild tendinous junction just distal to it in the left Achilles tendon  more than right with mild fusiform thickening without palpable defect or discontinuity.  Normal almonte test bilateral.    Ankle dorsiflexion decreased at <10 degrees bilateral with moderate increase with knee flexion bilateral.    Otherwise, Normal angle, base, station of gait. All ten toes without clubbing, cyanosis, or signs of ischemia.  No pain to palpation bilateral lower extremities.  Range of motion, stability, muscle strength, and muscle tone normal bilateral feet and legs.    Lymphadenopathy:      Lower Body: No right inguinal adenopathy. No left inguinal adenopathy.      Comments: Negative lymphadenopathy bilateral popliteal fossa and tarsal tunnel.    Negative lymphangitic streaking bilateral feet/ankles/legs.   Skin:     General: Skin is warm and dry.      Capillary Refill: Capillary refill takes 2 to 3 seconds.      Coloration: Skin is not pale.      Findings: No abrasion, bruising, burn, ecchymosis, erythema, laceration, lesion or rash.      Nails: There is no clubbing.      Comments: Skin is normal age and health appropriate color, turgor, texture, and temperature bilateral lower extremities without ulceration, hyperpigmentation, discoloration, masses nodules or cords palpated.  No ecchymosis, erythema, edema, or cardinal signs of infection bilateral lower extremities.     Neurological:      Mental Status: He is alert and oriented to person, place, and time.      Sensory: No sensory deficit.      Motor: No tremor, atrophy or abnormal muscle tone.      Gait: Gait normal.      Deep Tendon Reflexes:      Reflex Scores:       Patellar reflexes are 2+ on the right side and 2+ on the left side.       Achilles reflexes are 2+ on the right side and 2+ on the left side.     Comments: Negative allodynia both feet    Negative tinel sign to  percussion sural, superficial peroneal, deep peroneal, saphenous, and posterior tibial nerves right and left ankles and feet.     Psychiatric:         Behavior: Behavior is cooperative.             Assessment:       Encounter Diagnoses   Name Primary?    Achilles tendinitis of left lower extremity Yes    Equinus contracture of ankle     Plantar fasciitis     Foot pain, left     Strain of ankle and foot, left, initial encounter          Plan:       Erich was seen today for follow-up.    Diagnoses and all orders for this visit:    Achilles tendinitis of left lower extremity    Equinus contracture of ankle    Plantar fasciitis    Foot pain, left    Strain of ankle and foot, left, initial encounter    I counseled the patient on his conditions, their implications and medical management.        Cardiovascular activity as tolerated in shoes of choice.      Continue in vision finish current physical therapy.      Home exercise program for maintenance thereafter.      Recommend night splint for long-term maintenance prevention of recurrence.    Over-the-counter NSAID p.r.n. per label instructions          No follow-ups on file.

## 2022-11-17 ENCOUNTER — CLINICAL SUPPORT (OUTPATIENT)
Dept: REHABILITATION | Facility: OTHER | Age: 52
End: 2022-11-17
Payer: COMMERCIAL

## 2022-11-17 DIAGNOSIS — M25.572 LEFT ANKLE PAIN, UNSPECIFIED CHRONICITY: Primary | ICD-10-CM

## 2022-11-17 DIAGNOSIS — R26.2 DIFFICULTY WALKING: ICD-10-CM

## 2022-11-17 PROCEDURE — 97530 THERAPEUTIC ACTIVITIES: CPT | Mod: PN

## 2022-11-17 PROCEDURE — 97110 THERAPEUTIC EXERCISES: CPT | Mod: PN

## 2022-11-17 NOTE — PROGRESS NOTES
"    [facility]   Physical Therapy Treatment Note     Name: Erich Chery  Clinic Number: [id number]    Therapy Diagnosis:   Encounter Diagnoses   Name Primary?    Left ankle pain, unspecified chronicity Yes    Difficulty walking          Physician: Nicolas Castanon DPM    Visit Date: 11/17/2022  Physician Orders: PT Eval and Treat   Medical Diagnosis from Referral: M76.62 (ICD-10-CM) - Achilles tendinitis of left lower extremity M24.573 (ICD-10-CM) - Equinus contracture of ankle S96.912A (ICD-10-CM) - Strain of ankle and foot, left, initial encounter   Evaluation Date: 10/20/2022  Authorization Period Expiration: 12/31/2022  Plan of Care Expiration: 12/20/2022  Visit # / Visits authorized: 6/12  FOTO: 1/3 - 10/20/2022     Precautions: Standard     Time In: 215p  Time Out: 300p  Total Billable Time: 45 minutes      SUBJECTIVE     Pt reports: no increased pain today. He stated that his physician recommended a night splint to avoid recurrence of injury.  He was compliant with home exercise program half of the time  Response to previous treatment: Patient stated that he is able to perform therapeutic exercise without increased soreness.  Functional change: able to stand for prolonged periods of time    Pain: 0/10  Location: left achilles tendon     OBJECTIVE     Objective Measures updated at progress report unless specified.       TREATMENT   Charges based on 1-1 tx:    Erich received the treatments listed below:      therapeutic exercises to develop strength, endurance, ROM, flexibility, posture, and core stabilization for 30 minutes including:  +Elliptical x6 min, lvl 2.0  Bilateral calf raises off 6" step; focus on eccentric control down, 2x20  L DF mobilization lunges with L foot forward, 10" x 10  Bilateral gastroc stretch on incline, 30"x4  Bilateral soleus stretch on incline 30" x4  Shuttle L single leg heel raises with eccentric lowering, 75#, 2x20  Shuttle double leg squats 125# " "2x20  Shuttle L single leg squats,verbal cueing for eccentric lowering, 75#, 2x20  R/L Split squat with back lower extremity on BOSU 2x10  R/L trunk rotation with back lower extremity on BOSU 2x10  R/L SLS on BOSU (flat) with hip abduction 2x10     manual therapy techniques were applied or 00 minutes, including:  L A/P TCJ Gr III/IV JM  L forefoot inversion/eversion Gr II/III JM  L Talocrural distraction  L mobilization with movement for ankle impingement with dark purple resistance band, 10"x10     neuromuscular re-education activities to improve: Balance, Coordination, Kinesthetic, Sense, Proprioception, and Posture for 00 minutes. The following activities were included:  L SL stance on airex, 15# KB in RUE with cone tapping, x20  R/L Bosu steps overs, 3x10 ea  L SL squats on Bosu, 3x10  +Heel/toe walking on balance beam, 6 laps    therapeutic activities to improve functional performance for 10 minutes, including:  walking for 1 min at 3.5 mph and jogging for 1.5 mins at 5.3 on treadmill, 4 intervals (11.5 minutes)  L SL squats from chair, 2x15  +Walking heel raises, 15# KB, 40ftx2  +Sit to stand with 6# DB, feet on airex, 2x20 ==> 15# KB next visit  L SL with R ankle sliding 3 way with furniture slider, x20  Drinking birds with 15# KB, 40 ftx2  +Stability chair ankle press, 4 springs, 2x20  +Ball toss at rebounder, airex, 30x F/L      PATIENT EDUCATION AND HOME EXERCISES     Home Exercises Provided and Patient Education Provided     Education provided:   -exercise form and rationale     Written Home Exercises Provided: Patient instructed to cont prior HEP. Exercises were reviewed and Erich was able to demonstrate them prior to the end of the session.  Erich demonstrated good  understanding of the education provided. See EMR under Patient Instructions for exercises provided during therapy sessions    ASSESSMENT   Pt tolerated therapeutic interventions well with no complaint of increased pain. Patient reported relief " after physical therapy treatment today. Lower extremity strength has progressed well. He continues to have mild mid-tendon soreness of the left achilles especially when he doesn't do his exercises.      Erich Is progressing well towards his goals.   Pt prognosis is Good.     Pt will continue to benefit from skilled outpatient physical therapy to address the deficits listed in the problem list box on initial evaluation, provide pt/family education and to maximize pt's level of independence in the home and community environment.     Pt's spiritual, cultural and educational needs considered and pt agreeable to plan of care and goals.     Anticipated barriers to physical therapy: None    Goals:  Short Term Goals: In 3 weeks:  Patient will be independent with home exercise program (progressing, not met)    Patient will improve L ankle DF ROM to 15 degrees in order to navigate 15 stairs to get into the home (progressing, not met)    Patient will be able to perform 20 L single leg heel raises with good eccentric control in order to ski for a full day (progressing, not met)       Long Term Goals: In 6 weeks:  Patient will improve L ankle DF ROM to 20 degrees in order to run 300 yards. (progressing, not met)    Patient will improve L ankle Eversion ROM to 15 degrees in order to return to kicking a ball 10 times during a soccer match (progressing, not met)    Patient will be able to perform L single limb stance for 30 seconds with good ankle strategy in order to return to playing soccer once a week. (progressing, not met)      PLAN   Plan of care Certification: 10/20/2022 to 12/10/2022.     Outpatient Physical Therapy 2 times weekly for 6 weeks to include the following interventions: Cervical/Lumbar Traction, Electrical Stimulation dry needling, re-eval, Gait Training, Manual Therapy, Moist Heat/ Ice, Neuromuscular Re-ed, Patient Education, Self Care, Therapeutic Activities, Therapeutic Exercise, and Ultrasound.      Physical  therapist and physical therapy assistant(s) met face to face to discuss patient's treatment plan and progress towards established goals. Pt will be seen by a physical therapist minimally every 6th visit or every 30 days.     David Ferraro, PT

## 2022-12-28 ENCOUNTER — OFFICE VISIT (OUTPATIENT)
Dept: INTERNAL MEDICINE | Facility: CLINIC | Age: 52
End: 2022-12-28
Payer: COMMERCIAL

## 2022-12-28 VITALS
DIASTOLIC BLOOD PRESSURE: 80 MMHG | HEIGHT: 73 IN | WEIGHT: 241.38 LBS | HEART RATE: 60 BPM | BODY MASS INDEX: 31.99 KG/M2 | OXYGEN SATURATION: 97 % | SYSTOLIC BLOOD PRESSURE: 116 MMHG

## 2022-12-28 DIAGNOSIS — Z00.00 ANNUAL PHYSICAL EXAM: ICD-10-CM

## 2022-12-28 DIAGNOSIS — E78.2 MIXED HYPERLIPIDEMIA: Primary | ICD-10-CM

## 2022-12-28 DIAGNOSIS — E55.9 VITAMIN D DEFICIENCY: ICD-10-CM

## 2022-12-28 DIAGNOSIS — R31.9 HEMATURIA, UNSPECIFIED TYPE: ICD-10-CM

## 2022-12-28 DIAGNOSIS — J45.20 MILD INTERMITTENT ASTHMA WITHOUT COMPLICATION: ICD-10-CM

## 2022-12-28 DIAGNOSIS — Z13.6 ENCOUNTER FOR SCREENING FOR CARDIOVASCULAR DISORDERS: ICD-10-CM

## 2022-12-28 DIAGNOSIS — G47.33 OSA (OBSTRUCTIVE SLEEP APNEA): ICD-10-CM

## 2022-12-28 DIAGNOSIS — Z82.49 FAMILY HISTORY OF HEART DISEASE: ICD-10-CM

## 2022-12-28 PROCEDURE — 3008F PR BODY MASS INDEX (BMI) DOCUMENTED: ICD-10-PCS | Mod: CPTII,S$GLB,, | Performed by: INTERNAL MEDICINE

## 2022-12-28 PROCEDURE — 99214 OFFICE O/P EST MOD 30 MIN: CPT | Mod: S$GLB,,, | Performed by: INTERNAL MEDICINE

## 2022-12-28 PROCEDURE — 3044F HG A1C LEVEL LT 7.0%: CPT | Mod: CPTII,S$GLB,, | Performed by: INTERNAL MEDICINE

## 2022-12-28 PROCEDURE — 1159F PR MEDICATION LIST DOCUMENTED IN MEDICAL RECORD: ICD-10-PCS | Mod: CPTII,S$GLB,, | Performed by: INTERNAL MEDICINE

## 2022-12-28 PROCEDURE — 3044F PR MOST RECENT HEMOGLOBIN A1C LEVEL <7.0%: ICD-10-PCS | Mod: CPTII,S$GLB,, | Performed by: INTERNAL MEDICINE

## 2022-12-28 PROCEDURE — 3008F BODY MASS INDEX DOCD: CPT | Mod: CPTII,S$GLB,, | Performed by: INTERNAL MEDICINE

## 2022-12-28 PROCEDURE — 3079F PR MOST RECENT DIASTOLIC BLOOD PRESSURE 80-89 MM HG: ICD-10-PCS | Mod: CPTII,S$GLB,, | Performed by: INTERNAL MEDICINE

## 2022-12-28 PROCEDURE — 3074F SYST BP LT 130 MM HG: CPT | Mod: CPTII,S$GLB,, | Performed by: INTERNAL MEDICINE

## 2022-12-28 PROCEDURE — 3074F PR MOST RECENT SYSTOLIC BLOOD PRESSURE < 130 MM HG: ICD-10-PCS | Mod: CPTII,S$GLB,, | Performed by: INTERNAL MEDICINE

## 2022-12-28 PROCEDURE — 99999 PR PBB SHADOW E&M-EST. PATIENT-LVL IV: CPT | Mod: PBBFAC,,, | Performed by: INTERNAL MEDICINE

## 2022-12-28 PROCEDURE — 99999 PR PBB SHADOW E&M-EST. PATIENT-LVL IV: ICD-10-PCS | Mod: PBBFAC,,, | Performed by: INTERNAL MEDICINE

## 2022-12-28 PROCEDURE — 99214 PR OFFICE/OUTPT VISIT, EST, LEVL IV, 30-39 MIN: ICD-10-PCS | Mod: S$GLB,,, | Performed by: INTERNAL MEDICINE

## 2022-12-28 PROCEDURE — 1159F MED LIST DOCD IN RCRD: CPT | Mod: CPTII,S$GLB,, | Performed by: INTERNAL MEDICINE

## 2022-12-28 PROCEDURE — 3079F DIAST BP 80-89 MM HG: CPT | Mod: CPTII,S$GLB,, | Performed by: INTERNAL MEDICINE

## 2022-12-28 RX ORDER — ALBUTEROL SULFATE 90 UG/1
AEROSOL, METERED RESPIRATORY (INHALATION)
Qty: 8.5 G | Refills: 11 | Status: SHIPPED | OUTPATIENT
Start: 2022-12-28

## 2022-12-28 RX ORDER — FLUTICASONE PROPIONATE AND SALMETEROL 100; 50 UG/1; UG/1
1 POWDER RESPIRATORY (INHALATION) 2 TIMES DAILY
Qty: 60 EACH | Refills: 5 | Status: SHIPPED | OUTPATIENT
Start: 2022-12-28 | End: 2024-01-16

## 2022-12-28 NOTE — PROGRESS NOTES
[facility] Note    Chief Complaint      Chief Complaint   Patient presents with    [facility]       History of Present Illness      Erich Chery is a 52 y.o. male with chronic conditions of HLD, asthma, ARTUR, vit D deficiency who presents today for: [facility] [place] care.  Previously seen by Dr. Shelby.  Last annual visit 4/2022.  Labs reviewed.  Cholesterol slightly higher than previous check.  HLD: Controlling on lipitor.  Tries to eat low fat.  Has family hx of heart disease in father.  Asthma: previously mild intermittent.  Using proair BID which is controlling.  Seems to have accelerated since getting a new mattress.  Diet: Prepares own food.  Works a .  Tries to limit fatty foods.  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  Denies drinking and driving, drinking more than 4 drinks on occasion, drug use.      Flu shot discussed.  TdAP 2018.  COVID vaccine UTD.    Cscope 2021 Dr. Delatorre, polyps, 5 yr interval.      Past Medical History:  Past Medical History:   Diagnosis Date    ACL tear 3/9/2016    Asthma     Deviated septum     Genital herpes in men     Hyperlipidemia     Obesity     Tobacco use 11/28/2018       Past Surgical History:   has a past surgical history that includes Knee surgery; Esophagogastroduodenoscopy (N/A, 7/22/2021); and Colonoscopy (N/A, 7/22/2021).    Family History:  family history includes Cancer in his paternal grandfather; Heart disease in his father; Hyperlipidemia in his father.     Social History:  Social History     Tobacco Use    Smoking status: Light Smoker     Packs/day: 0.15     Years: 20.00     Pack years: 3.00     Types: Cigars, Cigarettes     Last attempt to quit: 7/1/2019     Years since quitting: 3.4    Smokeless tobacco: Former    Tobacco comments:     states he smokes approximately 10 cigarettes per week   Substance Use Topics    Alcohol use: Yes     Alcohol/week: 14.0 standard drinks     Types: 14 Shots of liquor  per week    Drug use: Yes     Types: Marijuana       I personally reviewed all past medical, surgical, social and family history.    Review of Systems   Constitutional:  Negative for chills, fever and malaise/fatigue.   Respiratory:  Negative for shortness of breath.    Cardiovascular:  Negative for chest pain.   Gastrointestinal:  Negative for constipation, diarrhea, nausea and vomiting.   Skin:  Negative for rash.   Neurological:  Negative for weakness.   All other systems reviewed and are negative.     Medications:  Outpatient Encounter Medications as of 12/28/2022   Medication Sig Dispense Refill    albuterol (PROVENTIL/VENTOLIN HFA) 90 mcg/actuation inhaler INHALE 2 PUFFS BY MOUTH EVERY 6 HOURS AS NEEDED FOR WHEEZING 8.5 g 11    atorvastatin (LIPITOR) 40 MG tablet TAKE 1 TABLET(40 MG) BY MOUTH EVERY DAY 90 tablet 0    fluticasone-salmeterol diskus inhaler 100-50 mcg Inhale 1 puff into the lungs 2 (two) times daily. Controller 60 each 5    [DISCONTINUED] albuterol (PROVENTIL/VENTOLIN HFA) 90 mcg/actuation inhaler INHALE 2 PUFFS BY MOUTH EVERY 6 HOURS AS NEEDED FOR WHEEZING 8.5 g 11    [DISCONTINUED] fluticasone propionate (FLONASE) 50 mcg/actuation nasal spray 1 spray (50 mcg total) by Each Nostril route once daily. (Patient not taking: Reported on 11/16/2022) 15.8 mL 6    [DISCONTINUED] meloxicam (MOBIC) 15 MG tablet Take 1 tablet (15 mg total) by mouth once daily. (Patient not taking: Reported on 11/16/2022) 30 tablet 0    [DISCONTINUED] sildenafiL (VIAGRA) 100 MG tablet TAKE 1/2 TO 1 TABLET BY MOUTH DAILY AS NEEDED FOR ERECTILE DYSFUNCTION (Patient not taking: Reported on 11/16/2022) 15 tablet 11    [DISCONTINUED] valACYclovir (VALTREX) 1000 MG tablet TAKE 1 TABLET(1000 MG) BY MOUTH EVERY DAY FOR 5 DAYS (Patient not taking: Reported on 11/16/2022) 30 tablet 2     No facility-administered encounter medications on file as of 12/28/2022.       Allergies:  Review of patient's allergies indicates:  No Known  "Allergies    Health Maintenance:  Immunization History   Administered Date(s) Administered    COVID-19, MRNA, LN-S, PF (Pfizer) (Purple Cap) 02/07/2022    COVID-19, vector-nr, rS-Ad26, PF (ViSSee) 03/31/2021    Hepatitis A / Hepatitis B 09/24/2018    Influenza - Quadrivalent - PF *Preferred* (6 months and older) 10/16/2020, 10/21/2021    Influenza Split 09/24/2018    Tdap 03/02/2018      Health Maintenance   Topic Date Due    Lipid Panel  04/06/2027    TETANUS VACCINE  03/02/2028    Hepatitis C Screening  Completed        Physical Exam      Vital Signs  Pulse: 60  SpO2: 97 %  BP: 116/80  BP Location: Left arm  Patient Position: Sitting  Pain Score: 0-No pain  Height and Weight  Height: 6' 1" (185.4 cm)  Weight: 109.5 kg (241 lb 6.5 oz)  BSA (Calculated - sq m): 2.37 sq meters  BMI (Calculated): 31.9  Weight in (lb) to have BMI = 25: 189.1]    Physical Exam  Vitals reviewed.   Constitutional:       Appearance: He is well-developed.   HENT:      Head: Normocephalic and atraumatic.      Right Ear: External ear normal.      Left Ear: External ear normal.   Cardiovascular:      Rate and Rhythm: Normal rate and regular rhythm.      Heart sounds: Normal heart sounds. No murmur heard.  Pulmonary:      Effort: Pulmonary effort is normal.      Breath sounds: Normal breath sounds. No wheezing or rales.   Abdominal:      General: Bowel sounds are normal.      Palpations: Abdomen is soft.        Laboratory:  CBC:  Recent Labs   Lab 07/13/20  1051 03/15/21  0935 04/06/22  0829   WBC 5.62 5.09 5.14   RBC 5.05 4.86 5.13   Hemoglobin 15.8 15.0 16.3   Hematocrit 47.6 44.4 47.7   Platelets 286 287 284   MCV 94 91 93   MCH 31.3 H 30.9 31.8 H   MCHC 33.2 33.8 34.2     CMP:  Recent Labs   Lab 07/13/20  1051 03/15/21  0935 04/06/22  0829 04/14/22  1040   Glucose 95 103 104  --    Calcium 9.4 9.8 9.6  --    Albumin 4.2 4.3 4.0  --    Total Protein 7.3 7.5 7.2  --    Sodium 141 142 142  --    Potassium 4.6 4.6 5.4 H 4.2   CO2 25 26 26  --  "   Chloride 106 106 106  --    BUN 15 18 18  --    Alkaline Phosphatase 64 61 47 L  --    ALT 33 41 36  --    AST 30 45 H 22  --    Total Bilirubin 0.8 0.6 0.6  --      URINALYSIS:  Recent Labs   Lab 03/09/21  1541   Color, UA Yellow   Specific Gravity, UA 1.030   pH, UA 5.0   Protein, UA Negative   Nitrite, UA Negative   Leukocytes, UA Negative      LIPIDS:  Recent Labs   Lab 07/13/20  1051 03/15/21  0935 04/06/22  0829   HDL 35 L 34 L 37 L   Cholesterol 219 H 220 H 259 H   Triglycerides 234 H 233 H 255 H   LDL Cholesterol 137.2 139.4 171.0 H   HDL/Cholesterol Ratio 16.0 L 15.5 L 14.3 L   Non-HDL Cholesterol 184 186 222   Total Cholesterol/HDL Ratio 6.3 H 6.5 H 7.0 H     TSH:      A1C:  Recent Labs   Lab 03/15/21  0935 04/06/22  0829   Hemoglobin A1C 5.0 5.2       Assessment/Plan     Erich Chery is a 52 y.o.male with:    1. Mixed hyperlipidemia  Continue current meds.  Update labs.  2. ARTUR (obstructive sleep apnea)  Controlled with oral appliance.  3. Vitamin D deficiency  STable  4. Mild intermittent asthma without complication  - albuterol (PROVENTIL/VENTOLIN HFA) 90 mcg/actuation inhaler; INHALE 2 PUFFS BY MOUTH EVERY 6 HOURS AS NEEDED FOR WHEEZING  Dispense: 8.5 g; Refill: 11  - fluticasone-salmeterol diskus inhaler 100-50 mcg; Inhale 1 puff into the lungs 2 (two) times daily. Controller  Dispense: 60 each; Refill: 5  Continue current meds.  Add advair  5. Family history of heart disease  - CT Calcium Scoring Cardiac; Future  Check CT calcium score  6. Hematuria, unspecified type  - Urinalysis, Reflex to Urine Culture Urine, Clean Catch; Future  Screening urinalysis.  7. Annual physical exam  - Urinalysis, Reflex to Urine Culture Urine, Clean Catch; Future  - CBC Auto Differential; Future  - Comprehensive Metabolic Panel; Future  - Lipid Panel; Future  - TSH; Future  - T4, Free; Future  - PSA, Screening; Future    8. Encounter for screening for cardiovascular disorders  - CT Calcium Scoring Cardiac;  Future       Chronic conditions status updated as per HPI.  Other than changes above, cont current medications and maintain follow up with specialists.  Follow up in about 6 months (around 6/28/2023).    Future Appointments   Date Time Provider Department Center   1/17/2023  8:30 AM LAB, APPOINTMENT [facility] DANIELA [facility] LAB IM Jesus Santana PCW   1/17/2023 12:45 PM [facility] OIC-CT2 500 LB LIMIT [facility]   6/27/2023  1:30 PM Alexis Redman MD [facility]       Alexis Redman MD  [facility]

## 2023-01-17 ENCOUNTER — HOSPITAL ENCOUNTER (OUTPATIENT)
Dept: RADIOLOGY | Facility: HOSPITAL | Age: 53
Discharge: HOME | End: 2023-01-17
Attending: INTERNAL MEDICINE
Payer: COMMERCIAL

## 2023-01-17 DIAGNOSIS — Z13.6 ENCOUNTER FOR SCREENING FOR CARDIOVASCULAR DISORDERS: ICD-10-CM

## 2023-01-17 DIAGNOSIS — Z82.49 FAMILY HISTORY OF HEART DISEASE: ICD-10-CM

## 2023-01-17 PROCEDURE — 75571 CT CALCIUM SCORING CARDIAC: ICD-10-PCS | Mod: 26,,, | Performed by: RADIOLOGY

## 2023-01-17 PROCEDURE — 75571 CT HRT W/O DYE W/CA TEST: CPT | Mod: TC

## 2023-01-17 PROCEDURE — 75571 CT HRT W/O DYE W/CA TEST: CPT | Mod: 26,,, | Performed by: RADIOLOGY

## 2023-01-18 ENCOUNTER — TELEPHONE (OUTPATIENT)
Dept: INTERNAL MEDICINE | Facility: CLINIC | Age: 53
End: 2023-01-18
Payer: COMMERCIAL

## 2023-01-18 DIAGNOSIS — R10.33 PERIUMBILICAL ABDOMINAL PAIN: Primary | ICD-10-CM

## 2023-01-19 ENCOUNTER — HOSPITAL ENCOUNTER (OUTPATIENT)
Dept: RADIOLOGY | Facility: HOSPITAL | Age: 53
Discharge: HOME | End: 2023-01-19
Attending: INTERNAL MEDICINE
Payer: COMMERCIAL

## 2023-01-19 DIAGNOSIS — R10.33 PERIUMBILICAL ABDOMINAL PAIN: ICD-10-CM

## 2023-01-19 PROCEDURE — 76705 US ABDOMEN LIMITED: ICD-10-PCS | Mod: 26,,, | Performed by: RADIOLOGY

## 2023-01-19 PROCEDURE — 76705 ECHO EXAM OF ABDOMEN: CPT | Mod: TC

## 2023-01-19 PROCEDURE — 76705 ECHO EXAM OF ABDOMEN: CPT | Mod: 26,,, | Performed by: RADIOLOGY

## 2023-01-25 NOTE — PROGRESS NOTES
Ultrasound normal.  Symptoms likely caused by muscle spasms as discussed.  Increase dietary potassium and magnesium

## 2023-03-07 ENCOUNTER — OFFICE VISIT (OUTPATIENT)
Dept: PODIATRY | Facility: CLINIC | Age: 53
End: 2023-03-07
Payer: COMMERCIAL

## 2023-03-07 ENCOUNTER — APPOINTMENT (OUTPATIENT)
Dept: RADIOLOGY | Facility: OTHER | Age: 53
End: 2023-03-07
Attending: PODIATRIST
Payer: COMMERCIAL

## 2023-03-07 VITALS
WEIGHT: 241.38 LBS | BODY MASS INDEX: 31.99 KG/M2 | DIASTOLIC BLOOD PRESSURE: 81 MMHG | HEART RATE: 72 BPM | SYSTOLIC BLOOD PRESSURE: 130 MMHG | HEIGHT: 73 IN

## 2023-03-07 DIAGNOSIS — M79.671 FOOT PAIN, RIGHT: ICD-10-CM

## 2023-03-07 DIAGNOSIS — M24.573 EQUINUS CONTRACTURE OF ANKLE: ICD-10-CM

## 2023-03-07 DIAGNOSIS — M77.9 CAPSULITIS: ICD-10-CM

## 2023-03-07 DIAGNOSIS — S96.911A STRAIN OF FOOT, RIGHT, INITIAL ENCOUNTER: ICD-10-CM

## 2023-03-07 DIAGNOSIS — M79.671 FOOT PAIN, RIGHT: Primary | ICD-10-CM

## 2023-03-07 PROCEDURE — 73630 X-RAY EXAM OF FOOT: CPT | Mod: 26,RT,, | Performed by: RADIOLOGY

## 2023-03-07 PROCEDURE — 99214 OFFICE O/P EST MOD 30 MIN: CPT | Mod: 25,S$GLB,, | Performed by: PODIATRIST

## 2023-03-07 PROCEDURE — 3008F PR BODY MASS INDEX (BMI) DOCUMENTED: ICD-10-PCS | Mod: CPTII,S$GLB,, | Performed by: PODIATRIST

## 2023-03-07 PROCEDURE — 1159F MED LIST DOCD IN RCRD: CPT | Mod: CPTII,S$GLB,, | Performed by: PODIATRIST

## 2023-03-07 PROCEDURE — 1159F PR MEDICATION LIST DOCUMENTED IN MEDICAL RECORD: ICD-10-PCS | Mod: CPTII,S$GLB,, | Performed by: PODIATRIST

## 2023-03-07 PROCEDURE — 3079F PR MOST RECENT DIASTOLIC BLOOD PRESSURE 80-89 MM HG: ICD-10-PCS | Mod: CPTII,S$GLB,, | Performed by: PODIATRIST

## 2023-03-07 PROCEDURE — 99999 PR PBB SHADOW E&M-EST. PATIENT-LVL III: ICD-10-PCS | Mod: PBBFAC,,, | Performed by: PODIATRIST

## 2023-03-07 PROCEDURE — 3075F SYST BP GE 130 - 139MM HG: CPT | Mod: CPTII,S$GLB,, | Performed by: PODIATRIST

## 2023-03-07 PROCEDURE — 3075F PR MOST RECENT SYSTOLIC BLOOD PRESS GE 130-139MM HG: ICD-10-PCS | Mod: CPTII,S$GLB,, | Performed by: PODIATRIST

## 2023-03-07 PROCEDURE — 73630 X-RAY EXAM OF FOOT: CPT | Mod: TC,RT

## 2023-03-07 PROCEDURE — 73630 XR FOOT COMPLETE 3 VIEW RIGHT: ICD-10-PCS | Mod: 26,RT,, | Performed by: RADIOLOGY

## 2023-03-07 PROCEDURE — 3008F BODY MASS INDEX DOCD: CPT | Mod: CPTII,S$GLB,, | Performed by: PODIATRIST

## 2023-03-07 PROCEDURE — 3079F DIAST BP 80-89 MM HG: CPT | Mod: CPTII,S$GLB,, | Performed by: PODIATRIST

## 2023-03-07 PROCEDURE — 99214 PR OFFICE/OUTPT VISIT, EST, LEVL IV, 30-39 MIN: ICD-10-PCS | Mod: 25,S$GLB,, | Performed by: PODIATRIST

## 2023-03-07 PROCEDURE — 99999 PR PBB SHADOW E&M-EST. PATIENT-LVL III: CPT | Mod: PBBFAC,,, | Performed by: PODIATRIST

## 2023-03-07 RX ORDER — MELOXICAM 15 MG/1
15 TABLET ORAL DAILY
Qty: 30 TABLET | Refills: 0 | Status: SHIPPED | OUTPATIENT
Start: 2023-03-07 | End: 2023-08-17

## 2023-03-07 NOTE — PROGRESS NOTES
Subjective:      Patient ID: Erich Chery is a 52 y.o. male.    Chief Complaint: Foot Pain (R foot)    Sharp deep pain in the bottom surface of the right foot.  Rapid onset following an episode of increased activity playing soccer intensely on Sunday (3 days ago) aggravated with pressure weight-bearing in shoes.  No prior medical treatment.  No self-treatment.  Patient denies specific velocity trauma and surgery right foot.    Review of Systems   Constitutional: Negative for chills, diaphoresis, fever, malaise/fatigue and night sweats.   Cardiovascular:  Negative for claudication, cyanosis, leg swelling and syncope.   Skin:  Negative for color change, dry skin, nail changes, rash, suspicious lesions and unusual hair distribution.   Musculoskeletal:  Positive for joint pain. Negative for falls, joint swelling, muscle cramps, muscle weakness and stiffness.   Gastrointestinal:  Negative for constipation, diarrhea, nausea and vomiting.   Neurological:  Negative for brief paralysis, disturbances in coordination, focal weakness, numbness, paresthesias, sensory change and tremors.         Objective:      Physical Exam  Constitutional:       General: He is not in acute distress.     Appearance: He is well-developed. He is not diaphoretic.   Cardiovascular:      Pulses:           Popliteal pulses are 2+ on the right side and 2+ on the left side.        Dorsalis pedis pulses are 2+ on the right side and 2+ on the left side.        Posterior tibial pulses are 2+ on the right side and 2+ on the left side.      Comments: Capillary refill 3 seconds all toes/distal feet, all toes/both feet warm to touch.      Negative lymphadenopathy bilateral popliteal fossa and tarsal tunnel.      Negavie lower extremity edema bilateral.    Musculoskeletal:      Right ankle: No swelling, deformity, ecchymosis or lacerations. Normal range of motion. Normal pulse.      Right Achilles Tendon: Normal. No defects. Almonte's test negative.       Comments: Pain to palpation inferior mtpj 2,3,4 right without evidence of trauma or infection.    Ankle dorsiflexion decreased at <10 degrees bilateral with moderate increase with knee flexion bilateral.    Significant pain to deep palpation of plantar intrinsic muscles in the arch of the right foot without deformity, loss of function, signs of acute trauma.      Otherwise, antalgic gait favoring left foot.   Lymphadenopathy:      Lower Body: No right inguinal adenopathy. No left inguinal adenopathy.      Comments: Negative lymphadenopathy bilateral popliteal fossa and tarsal tunnel.    Negative lymphangitic streaking bilateral feet/ankles/legs.   Skin:     General: Skin is warm and dry.      Capillary Refill: Capillary refill takes 2 to 3 seconds.      Coloration: Skin is not pale.      Findings: No abrasion, bruising, burn, ecchymosis, erythema, laceration, lesion or rash.      Nails: There is no clubbing.      Comments:   Skin is normal age and health appropriate color, turgor, texture, and temperature bilateral lower extremities without ulceration, hyperpigmentation, discoloration, masses nodules or cords palpated.  No ecchymosis, erythema, edema, or cardinal signs of infection bilateral lower extremities.     Neurological:      Mental Status: He is alert and oriented to person, place, and time.      Sensory: No sensory deficit.      Motor: No tremor, atrophy or abnormal muscle tone.      Gait: Gait normal.      Comments:   Negative moulder sign/click bilateral all intermetatarsal spaces.    Negative allodynia both feet    Negative tinel sign to percussion sural, superficial peroneal, deep peroneal, saphenous, and posterior tibial nerves right and left ankles and feet.     Psychiatric:         Behavior: Behavior is cooperative.           Assessment:       Encounter Diagnoses   Name Primary?    Foot pain, right Yes    Strain of foot, right, initial encounter     Equinus contracture of ankle     Capsulitis           Plan:       Erich was seen today for foot pain.    Diagnoses and all orders for this visit:    Foot pain, right  -     X-Ray Foot Complete Right; Future    Strain of foot, right, initial encounter  -     X-Ray Foot Complete Right; Future    Equinus contracture of ankle  -     X-Ray Foot Complete Right; Future    Capsulitis  -     X-Ray Foot Complete Right; Future    Other orders  -     meloxicam (MOBIC) 15 MG tablet; Take 1 tablet (15 mg total) by mouth once daily.      I counseled the patient on his conditions, their implications and medical management.        Patient will stretch the tendo achilles complex three times daily as demonstrated in the office.  Literature was dispensed illustrating proper stretching technique.    I applied a plantar rest strapping to the patient's right foot to offload symptomatic area, support the arch, and relieve pain.    Patient will obtain over the counter arch supports and wear them in shoes whenever possible.  Athletic shoes intended for walking or running are usually best.    The patient was advised that NSAID-type medications have two very important potential side effects: gastrointestinal irritation including hemorrhage and renal injuries. He was asked to take the medication with food and to stop if he experiences any GI upset. I asked him to call for vomiting, abdominal pain or black/bloody stools. The patient expresses understanding of these issues and questions were answered.    Discussed conservative treatment with shoes of adequate dimensions, material, and style to alleviate symptoms and delay or prevent surgical intervention.    Will wear fracture boot he has at home all times ambulating to tolerance weaning to shoes as symptoms allow.      Recommend new set of over-the-counter arch supports which he also has at home.      X-rays, meloxicam, 1 month.          No follow-ups on file.

## 2023-03-20 ENCOUNTER — TELEPHONE (OUTPATIENT)
Dept: PRIMARY CARE CLINIC | Facility: CLINIC | Age: 53
End: 2023-03-20
Payer: COMMERCIAL

## 2023-03-20 DIAGNOSIS — F51.01 PRIMARY INSOMNIA: Primary | ICD-10-CM

## 2023-03-20 RX ORDER — ZOLPIDEM TARTRATE 5 MG/1
5 TABLET ORAL NIGHTLY PRN
Qty: 30 TABLET | Refills: 0 | Status: SHIPPED | OUTPATIENT
Start: 2023-03-20 | End: 2023-08-21

## 2023-03-20 NOTE — TELEPHONE ENCOUNTER
Pt requesting sleep medication to use while on an extended period of travel.  Has never tried any similar medications in the past.  Sending in ambien.  Counseled on risk of combining with other sedatives or alcohol.

## 2023-05-02 ENCOUNTER — TELEPHONE (OUTPATIENT)
Dept: PRIMARY CARE CLINIC | Facility: CLINIC | Age: 53
End: 2023-05-02
Payer: COMMERCIAL

## 2023-05-10 DIAGNOSIS — E78.2 MIXED HYPERLIPIDEMIA: ICD-10-CM

## 2023-05-10 RX ORDER — ATORVASTATIN CALCIUM 40 MG/1
40 TABLET, FILM COATED ORAL DAILY
Qty: 90 TABLET | Refills: 3 | Status: SHIPPED | OUTPATIENT
Start: 2023-05-10 | End: 2023-08-21 | Stop reason: SDUPTHER

## 2023-05-10 RX ORDER — SILDENAFIL 100 MG/1
TABLET, FILM COATED ORAL
COMMUNITY
Start: 2023-03-18 | End: 2023-05-10 | Stop reason: SDUPTHER

## 2023-05-10 RX ORDER — SILDENAFIL 100 MG/1
100 TABLET, FILM COATED ORAL
Qty: 30 TABLET | Refills: 3 | Status: SHIPPED | OUTPATIENT
Start: 2023-05-10

## 2023-05-10 NOTE — TELEPHONE ENCOUNTER
----- Message from Janneth Rodney sent at 5/10/2023  1:44 PM CDT -----  Contact: [phone]  Requesting an RX refill or new RX.  Is this a refill or new RX: refill  RX name and strength (copy/paste from chart):  atorvastatin (LIPITOR) 40 MG tablet  Is this a 30 day or 90 day RX:   Pharmacy name and phone # (copy/paste from chart):    [facility] #[id number] [address] AT [address] [address]  Phone: [phone] Fax: [phone]  The doctors have asked that we provide their patients with the following 2 reminders -- prescription refills can take up to 72 hours, and a friendly reminder that in the future you can use your [facility] account to request refills: yes    Requesting an RX refill or new RX.  Is this a refill or new RX: refill  RX name and strength (copy/paste from chart):  sildenafil 100 MG (not in chart)  Is this a 30 day or 90 day RX: 30  Pharmacy name and phone # (copy/paste from chart):    [facility] #[id number] [facility] [address] AT [address] [address]  Phone: [phone] Fax: [phone]  The doctors have asked that we provide their patients with the following 2 reminders -- prescription refills can take up to 72 hours, and a friendly reminder that in the future you can use your [facility] account to request refills: yes      Please call and let pt know when they have been call in. Thanks

## 2023-08-17 ENCOUNTER — OFFICE VISIT (OUTPATIENT)
Dept: PODIATRY | Facility: CLINIC | Age: 53
End: 2023-08-17
Payer: COMMERCIAL

## 2023-08-17 VITALS
DIASTOLIC BLOOD PRESSURE: 82 MMHG | SYSTOLIC BLOOD PRESSURE: 123 MMHG | HEART RATE: 71 BPM | HEIGHT: 73 IN | BODY MASS INDEX: 31.99 KG/M2 | WEIGHT: 241.38 LBS

## 2023-08-17 DIAGNOSIS — M72.2 PLANTAR FASCIITIS: ICD-10-CM

## 2023-08-17 DIAGNOSIS — M24.573 EQUINUS CONTRACTURE OF ANKLE: Primary | ICD-10-CM

## 2023-08-17 DIAGNOSIS — M79.671 FOOT PAIN, RIGHT: ICD-10-CM

## 2023-08-17 PROCEDURE — 1159F PR MEDICATION LIST DOCUMENTED IN MEDICAL RECORD: ICD-10-PCS | Mod: CPTII,S$GLB,, | Performed by: PODIATRIST

## 2023-08-17 PROCEDURE — 99214 OFFICE O/P EST MOD 30 MIN: CPT | Mod: S$GLB,,, | Performed by: PODIATRIST

## 2023-08-17 PROCEDURE — 3008F BODY MASS INDEX DOCD: CPT | Mod: CPTII,S$GLB,, | Performed by: PODIATRIST

## 2023-08-17 PROCEDURE — 3008F PR BODY MASS INDEX (BMI) DOCUMENTED: ICD-10-PCS | Mod: CPTII,S$GLB,, | Performed by: PODIATRIST

## 2023-08-17 PROCEDURE — 3079F PR MOST RECENT DIASTOLIC BLOOD PRESSURE 80-89 MM HG: ICD-10-PCS | Mod: CPTII,S$GLB,, | Performed by: PODIATRIST

## 2023-08-17 PROCEDURE — 99214 PR OFFICE/OUTPT VISIT, EST, LEVL IV, 30-39 MIN: ICD-10-PCS | Mod: S$GLB,,, | Performed by: PODIATRIST

## 2023-08-17 PROCEDURE — 99999 PR PBB SHADOW E&M-EST. PATIENT-LVL III: ICD-10-PCS | Mod: PBBFAC,,, | Performed by: PODIATRIST

## 2023-08-17 PROCEDURE — 3074F PR MOST RECENT SYSTOLIC BLOOD PRESSURE < 130 MM HG: ICD-10-PCS | Mod: CPTII,S$GLB,, | Performed by: PODIATRIST

## 2023-08-17 PROCEDURE — 3074F SYST BP LT 130 MM HG: CPT | Mod: CPTII,S$GLB,, | Performed by: PODIATRIST

## 2023-08-17 PROCEDURE — 3079F DIAST BP 80-89 MM HG: CPT | Mod: CPTII,S$GLB,, | Performed by: PODIATRIST

## 2023-08-17 PROCEDURE — 1159F MED LIST DOCD IN RCRD: CPT | Mod: CPTII,S$GLB,, | Performed by: PODIATRIST

## 2023-08-17 PROCEDURE — 99999 PR PBB SHADOW E&M-EST. PATIENT-LVL III: CPT | Mod: PBBFAC,,, | Performed by: PODIATRIST

## 2023-08-17 RX ORDER — MELOXICAM 15 MG/1
15 TABLET ORAL DAILY
Qty: 30 TABLET | Refills: 0 | Status: SHIPPED | OUTPATIENT
Start: 2023-08-17

## 2023-08-17 NOTE — PROGRESS NOTES
Subjective:      Patient ID: Erich Chery is a 52 y.o. male.    Chief Complaint: Foot Pain (R foot pain)    Sharp deep burning pain in the bottom of the 1st ray beneath 1st metatarsal head proximal to the sesamoid apparatus indicated with index finger.  Rapid onset onset playing soccer about a month ago.  Bump has resolved, pain remains.  Aggravated with increased weight-bearing particularly impact exercises leg pain soccer.  Periodic prior medical treatment here with stretches night splint NSAIDs athletic shoes arch supports have helped and spells.  When he improves typically adherence to the plan diminishes and symptoms returned.  Denies velocity trauma and surgery right foot    Review of Systems   Constitutional: Negative for chills, diaphoresis, fever, malaise/fatigue and night sweats.   Cardiovascular:  Negative for claudication, cyanosis, leg swelling and syncope.   Skin:  Negative for color change, dry skin, nail changes, rash, suspicious lesions and unusual hair distribution.   Musculoskeletal:  Negative for falls, joint pain, joint swelling, muscle cramps, muscle weakness and stiffness.   Gastrointestinal:  Negative for constipation, diarrhea, nausea and vomiting.   Neurological:  Negative for brief paralysis, disturbances in coordination, focal weakness, numbness, paresthesias, sensory change and tremors.         Objective:      Physical Exam  Constitutional:       General: He is not in acute distress.     Appearance: He is well-developed. He is not diaphoretic.   Cardiovascular:      Pulses:           Popliteal pulses are 2+ on the right side and 2+ on the left side.        Dorsalis pedis pulses are 2+ on the right side and 2+ on the left side.        Posterior tibial pulses are 2+ on the right side and 2+ on the left side.      Comments: Capillary refill 3 seconds all toes/distal feet, all toes/both feet warm to touch.      Negative lymphadenopathy bilateral popliteal fossa and tarsal tunnel.       Negavie lower extremity edema bilateral.    Musculoskeletal:      Right ankle: No swelling, deformity, ecchymosis or lacerations. Normal range of motion. Normal pulse.      Right Achilles Tendon: Normal. No defects. Almonte's test negative.      Comments: Sharp deep pain to palpation with minor increased density along the medial slip of the medial band of plantar fascia extends the bottom of the 1st MTP without gross deformity, loss of function, signs of acute trauma.      Ankle dorsiflexion decreased at <10 degrees bilateral with moderate increase with knee flexion bilateral.    Otherwise, Normal angle, base, station of gait. All ten toes without clubbing, cyanosis, or signs of ischemia.  No pain to palpation bilateral lower extremities.  Range of motion, stability, muscle strength, and muscle tone normal bilateral feet and legs.    Lymphadenopathy:      Lower Body: No right inguinal adenopathy. No left inguinal adenopathy.      Comments: Negative lymphadenopathy bilateral popliteal fossa and tarsal tunnel.    Negative lymphangitic streaking bilateral feet/ankles/legs.   Skin:     General: Skin is warm and dry.      Capillary Refill: Capillary refill takes 2 to 3 seconds.      Coloration: Skin is not pale.      Findings: No abrasion, bruising, burn, ecchymosis, erythema, laceration, lesion or rash.      Nails: There is no clubbing.      Comments:   Skin is normal age and health appropriate color, turgor, texture, and temperature bilateral lower extremities without ulceration, hyperpigmentation, discoloration, masses nodules or cords palpated.  No ecchymosis, erythema, edema, or cardinal signs of infection bilateral lower extremities.     Neurological:      Mental Status: He is alert and oriented to person, place, and time.      Sensory: No sensory deficit.      Motor: No tremor, atrophy or abnormal muscle tone.      Gait: Gait normal.      Deep Tendon Reflexes:      Reflex Scores:       Patellar reflexes are 2+ on  the right side and 2+ on the left side.       Achilles reflexes are 2+ on the right side and 2+ on the left side.     Comments: Negative tinel sign to percussion sural, superficial peroneal, deep peroneal, saphenous, and posterior tibial nerves right and left ankles and feet.     Psychiatric:         Behavior: Behavior is cooperative.           Assessment:       Encounter Diagnoses   Name Primary?    Equinus contracture of ankle Yes    Foot pain, right     Plantar fasciitis          Plan:       Erich was seen today for foot pain.    Diagnoses and all orders for this visit:    Equinus contracture of ankle  -     Ambulatory referral/consult to Physical/Occupational Therapy; Future  -     ORTHOTIC DEVICE (DME)    Foot pain, right  -     Ambulatory referral/consult to Physical/Occupational Therapy; Future  -     ORTHOTIC DEVICE (DME)    Plantar fasciitis  -     Ambulatory referral/consult to Physical/Occupational Therapy; Future  -     ORTHOTIC DEVICE (DME)    Other orders  -     meloxicam (MOBIC) 15 MG tablet; Take 1 tablet (15 mg total) by mouth once daily.      I counseled the patient on his conditions, their implications and medical management.    The patient was advised that NSAID-type medications have two very important potential side effects: gastrointestinal irritation including hemorrhage and renal injuries. He was asked to take the medication with food and to stop if he experiences any GI upset. I asked him to call for vomiting, abdominal pain or black/bloody stools. The patient expresses understanding of these issues and questions were answered.     Therapy, custom orthotics, meloxicam.      Wear night splint faithfully.      We implement stretching on a regular basis and over-the-counter arch supports particularly in the shoes which he is having high impact activity like soccer.      Six weeks, sooner p.r.n.          Follow up in about 6 weeks (around 9/28/2023).

## 2023-08-21 ENCOUNTER — OFFICE VISIT (OUTPATIENT)
Dept: PRIMARY CARE CLINIC | Facility: CLINIC | Age: 53
End: 2023-08-21
Payer: COMMERCIAL

## 2023-08-21 VITALS
WEIGHT: 245.56 LBS | DIASTOLIC BLOOD PRESSURE: 72 MMHG | HEIGHT: 73 IN | OXYGEN SATURATION: 95 % | SYSTOLIC BLOOD PRESSURE: 104 MMHG | HEART RATE: 65 BPM | BODY MASS INDEX: 32.54 KG/M2

## 2023-08-21 DIAGNOSIS — F51.01 PRIMARY INSOMNIA: ICD-10-CM

## 2023-08-21 DIAGNOSIS — G47.33 OSA (OBSTRUCTIVE SLEEP APNEA): ICD-10-CM

## 2023-08-21 DIAGNOSIS — E78.2 MIXED HYPERLIPIDEMIA: ICD-10-CM

## 2023-08-21 DIAGNOSIS — Z00.00 ANNUAL PHYSICAL EXAM: Primary | ICD-10-CM

## 2023-08-21 PROCEDURE — 3074F SYST BP LT 130 MM HG: CPT | Mod: CPTII,S$GLB,, | Performed by: INTERNAL MEDICINE

## 2023-08-21 PROCEDURE — 3008F BODY MASS INDEX DOCD: CPT | Mod: CPTII,S$GLB,, | Performed by: INTERNAL MEDICINE

## 2023-08-21 PROCEDURE — 99999 PR PBB SHADOW E&M-EST. PATIENT-LVL III: CPT | Mod: PBBFAC,,, | Performed by: INTERNAL MEDICINE

## 2023-08-21 PROCEDURE — 3078F PR MOST RECENT DIASTOLIC BLOOD PRESSURE < 80 MM HG: ICD-10-PCS | Mod: CPTII,S$GLB,, | Performed by: INTERNAL MEDICINE

## 2023-08-21 PROCEDURE — 1159F MED LIST DOCD IN RCRD: CPT | Mod: CPTII,S$GLB,, | Performed by: INTERNAL MEDICINE

## 2023-08-21 PROCEDURE — 3008F PR BODY MASS INDEX (BMI) DOCUMENTED: ICD-10-PCS | Mod: CPTII,S$GLB,, | Performed by: INTERNAL MEDICINE

## 2023-08-21 PROCEDURE — 99396 PR PREVENTIVE VISIT,EST,40-64: ICD-10-PCS | Mod: S$GLB,,, | Performed by: INTERNAL MEDICINE

## 2023-08-21 PROCEDURE — 3078F DIAST BP <80 MM HG: CPT | Mod: CPTII,S$GLB,, | Performed by: INTERNAL MEDICINE

## 2023-08-21 PROCEDURE — 99396 PREV VISIT EST AGE 40-64: CPT | Mod: S$GLB,,, | Performed by: INTERNAL MEDICINE

## 2023-08-21 PROCEDURE — 1159F PR MEDICATION LIST DOCUMENTED IN MEDICAL RECORD: ICD-10-PCS | Mod: CPTII,S$GLB,, | Performed by: INTERNAL MEDICINE

## 2023-08-21 PROCEDURE — 99999 PR PBB SHADOW E&M-EST. PATIENT-LVL III: ICD-10-PCS | Mod: PBBFAC,,, | Performed by: INTERNAL MEDICINE

## 2023-08-21 PROCEDURE — 3074F PR MOST RECENT SYSTOLIC BLOOD PRESSURE < 130 MM HG: ICD-10-PCS | Mod: CPTII,S$GLB,, | Performed by: INTERNAL MEDICINE

## 2023-08-21 RX ORDER — ATORVASTATIN CALCIUM 40 MG/1
40 TABLET, FILM COATED ORAL DAILY
Qty: 90 TABLET | Refills: 3 | Status: SHIPPED | OUTPATIENT
Start: 2023-08-21

## 2023-08-21 NOTE — PROGRESS NOTES
[facility] Note    Chief Complaint      Chief Complaint   Patient presents with    Annual Exam       History of Present Illness      Erich Chery is a 52 y.o. male with chronic conditions of HLD, asthma, ARTUR, vit D deficiency who presents today for:  Annual preventative visit.  Still not sleeping well.  Uses oral appliance.  Also seeing podiatry for fasciitis.  Reports abdominal pains, migrating   HLD: Controlling on lipitor.  Tries to eat low fat.  Has family hx of heart disease in father.  Asthma: previously mild intermittent.  Using proair BID which is controlling.  Seems to have accelerated since getting a new mattress.  Diet: Prepares own food.  Works a .  Tries to limit fatty foods.  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  Denies drinking and driving, drinking more than 4 drinks on occasion, drug use.      Flu shot discussed.  TdAP 2018.  COVID vaccine UTD.    Cscope  Dr. Delatorre, polyps, 5 yr interval.         Past Medical History:  Past Medical History:   Diagnosis Date    ACL tear 3/9/2016    Asthma     Deviated septum     Genital herpes in men     Hyperlipidemia     Obesity     Tobacco use 2018       Past Surgical History:   has a past surgical history that includes Knee surgery; Esophagogastroduodenoscopy (N/A, 2021); and Colonoscopy (N/A, 2021).    Family History:  family history includes Cancer in his paternal grandfather; Heart disease in his father; Hyperlipidemia in his father.     Social History:  Social History     Tobacco Use    Smoking status: Light Smoker     Current packs/day: 0.00     Average packs/day: 0.2 packs/day for 20.0 years (3.0 ttl pk-yrs)     Types: Cigars, Cigarettes     Start date: 1999     Last attempt to quit: 2019     Years since quittin.1    Smokeless tobacco: Former    Tobacco comments:     states he smokes approximately 10 cigarettes per week   Substance Use Topics    Alcohol use:  Yes     Alcohol/week: 14.0 standard drinks of alcohol     Types: 14 Shots of liquor per week    Drug use: Yes     Types: Marijuana       I personally reviewed all past medical, surgical, social and family history.    Review of Systems   Constitutional:  Negative for chills, fever and malaise/fatigue.   Respiratory:  Negative for shortness of breath.    Cardiovascular:  Negative for chest pain.   Gastrointestinal:  Negative for constipation, diarrhea, nausea and vomiting.   Skin:  Negative for rash.   Neurological:  Negative for weakness.   All other systems reviewed and are negative.       Medications:  Outpatient Encounter Medications as of 8/21/2023   Medication Sig Dispense Refill    albuterol (PROVENTIL/VENTOLIN HFA) 90 mcg/actuation inhaler INHALE 2 PUFFS BY MOUTH EVERY 6 HOURS AS NEEDED FOR WHEEZING 8.5 g 11    fluticasone-salmeterol diskus inhaler 100-50 mcg Inhale 1 puff into the lungs 2 (two) times daily. Controller 60 each 5    meloxicam (MOBIC) 15 MG tablet Take 1 tablet (15 mg total) by mouth once daily. 30 tablet 0    sildenafiL (VIAGRA) 100 MG tablet Take 1 tablet (100 mg total) by mouth as needed for Erectile Dysfunction. 30 tablet 3    [DISCONTINUED] atorvastatin (LIPITOR) 40 MG tablet Take 1 tablet (40 mg total) by mouth once daily. 90 tablet 3    atorvastatin (LIPITOR) 40 MG tablet Take 1 tablet (40 mg total) by mouth once daily. 90 tablet 3    [DISCONTINUED] meloxicam (MOBIC) 15 MG tablet Take 1 tablet (15 mg total) by mouth once daily. 30 tablet 0    [DISCONTINUED] zolpidem (AMBIEN) 5 MG Tab Take 1 tablet (5 mg total) by mouth nightly as needed. (Patient not taking: Reported on 8/21/2023) 30 tablet 0     No facility-administered encounter medications on file as of 8/21/2023.       Allergies:  Review of patient's allergies indicates:  No Known Allergies    Health Maintenance:  Immunization History   Administered Date(s) Administered    COVID-19, MRNA, LN-S, PF (Pfizer) (Purple Cap) 03/20/2021,  "04/12/2021, 02/07/2022    COVID-19, vector-nr, rS-Ad26, PF ([employer]) 03/31/2021    Hepatitis A / Hepatitis B 09/24/2018    Influenza - Quadrivalent - PF *Preferred* (6 months and older) 10/16/2020, 10/21/2021    Influenza Split 09/24/2018    Tdap 03/02/2018      Health Maintenance   Topic Date Due    Shingles Vaccine (1 of 2) Never done    Colorectal Cancer Screening  07/22/2026    Lipid Panel  01/17/2028    TETANUS VACCINE  03/02/2028    Hepatitis C Screening  Completed        Physical Exam      Vital Signs  Pulse: 65  SpO2: 95 %  BP: 104/72  BP Location: Left arm  Patient Position: Sitting  Pain Score:   2  Pain Loc: Foot  Height and Weight  Height: 6' 1" (185.4 cm)  Weight: 111.4 kg (245 lb 9.5 oz)  BSA (Calculated - sq m): 2.4 sq meters  BMI (Calculated): 32.4  Weight in (lb) to have BMI = 25: 189.1]    Physical Exam  Vitals reviewed.   Constitutional:       Appearance: He is well-developed.   HENT:      Head: Normocephalic and atraumatic.      Right Ear: External ear normal.      Left Ear: External ear normal.   Cardiovascular:      Rate and Rhythm: Normal rate and regular rhythm.      Heart sounds: Normal heart sounds. No murmur heard.  Pulmonary:      Effort: Pulmonary effort is normal.      Breath sounds: Normal breath sounds. No wheezing or rales.   Abdominal:      General: Bowel sounds are normal.      Palpations: Abdomen is soft.          Laboratory:  CBC:  Recent Labs   Lab 03/15/21  0935 04/06/22  0829 01/17/23  0826   WBC 5.09 5.14 5.58   RBC 4.86 5.13 4.72   Hemoglobin 15.0 16.3 14.6   Hematocrit 44.4 47.7 45.4   Platelets 287 284 306   MCV 91 93 96   MCH 30.9 31.8 H 30.9   MCHC 33.8 34.2 32.2     CMP:  Recent Labs   Lab 03/15/21  0935 04/06/22  0829 04/14/22  1040 01/17/23  0826   Glucose 103 104  --  94   Calcium 9.8 9.6  --  10.2   Albumin 4.3 4.0  --  4.0   Total Protein 7.5 7.2  --  7.1   Sodium 142 142  --  140   Potassium 4.6 5.4 H   < > 4.8   CO2 26 26  --  26   Chloride 106 106  --  104   BUN " 18 18  --  18   Alkaline Phosphatase 61 47 L  --  55   ALT 41 36  --  28   AST 45 H 22  --  18   Total Bilirubin 0.6 0.6  --  0.5    < > = values in this interval not displayed.     URINALYSIS:  Recent Labs   Lab 03/09/21  1541   Color, UA Yellow   Specific Gravity, UA 1.030   pH, UA 5.0   Protein, UA Negative   Nitrite, UA Negative   Leukocytes, UA Negative      LIPIDS:  Recent Labs   Lab 03/15/21  0935 04/06/22  0829 01/17/23  0826   TSH  --   --  2.196   HDL 34 L 37 L 33 L   Cholesterol 220 H 259 H 197   Triglycerides 233 H 255 H 246 H   LDL Cholesterol 139.4 171.0 H 114.8   HDL/Cholesterol Ratio 15.5 L 14.3 L 16.8 L   Non-HDL Cholesterol 186 222 164   Total Cholesterol/HDL Ratio 6.5 H 7.0 H 6.0 H     TSH:  Recent Labs   Lab 01/17/23  0826   TSH 2.196     A1C:  Recent Labs   Lab 03/15/21  0935 04/06/22  0829   Hemoglobin A1C 5.0 5.2       Assessment/Plan     Erich Chery is a 52 y.o.male with:    1. Annual physical exam  Discussed diet and exercise, vaccines and cancer screening, risk factors.  Screening labs ordered.     2. Mixed hyperlipidemia  - Comprehensive Metabolic Panel; Future  - Lipid Panel; Future  - atorvastatin (LIPITOR) 40 MG tablet; Take 1 tablet (40 mg total) by mouth once daily.  Dispense: 90 tablet; Refill: 3  Continue current meds.    3. ARTUR (obstructive sleep apnea)  Cont CPAP  4. Primary insomnia  Continue current meds.      Chronic conditions status updated as per HPI.  Other than changes above, cont current medications and maintain follow up with specialists.  No follow-ups on file.    Future Appointments   Date Time Provider Department Center   8/28/2023  9:30 AM LAB, [facility]       Alexis Redman MD  [facility]

## 2023-08-24 ENCOUNTER — CLINICAL SUPPORT (OUTPATIENT)
Dept: REHABILITATION | Facility: OTHER | Age: 53
End: 2023-08-24
Attending: PODIATRIST
Payer: COMMERCIAL

## 2023-08-24 DIAGNOSIS — M25.671 ANKLE JOINT STIFFNESS, BILATERAL: ICD-10-CM

## 2023-08-24 DIAGNOSIS — M79.671 RIGHT FOOT PAIN: ICD-10-CM

## 2023-08-24 DIAGNOSIS — M79.671 FOOT PAIN, RIGHT: ICD-10-CM

## 2023-08-24 DIAGNOSIS — M25.672 ANKLE JOINT STIFFNESS, BILATERAL: ICD-10-CM

## 2023-08-24 DIAGNOSIS — M24.573 EQUINUS CONTRACTURE OF ANKLE: ICD-10-CM

## 2023-08-24 DIAGNOSIS — M72.2 PLANTAR FASCIITIS: ICD-10-CM

## 2023-08-24 PROCEDURE — 97530 THERAPEUTIC ACTIVITIES: CPT | Mod: PN,97 | Performed by: PHYSICAL THERAPIST

## 2023-08-24 PROCEDURE — 97162 PT EVAL MOD COMPLEX 30 MIN: CPT | Mod: PN | Performed by: PHYSICAL THERAPIST

## 2023-08-24 PROCEDURE — 97110 THERAPEUTIC EXERCISES: CPT | Mod: PN,97 | Performed by: PHYSICAL THERAPIST

## 2023-08-24 NOTE — PLAN OF CARE
[facility]   Physical Therapy Initial Evaluation      Name: Erich Chery  Clinic Number: [id number]    Therapy Diagnosis:   Encounter Diagnoses   Name Primary?    Equinus contracture of ankle     Foot pain, right     Plantar fasciitis     Right foot pain     Ankle joint stiffness, bilateral         Physician: Nicolas Castanon DPM    Physician Orders: PT Eval and Treat   Medical Diagnosis from Referral: M24.573 (ICD-10-CM) - Equinus contracture of ankle M79.671 (ICD-10-CM) - Foot pain, right M72.2 (ICD-10-CM) - Plantar fasciitis   Evaluation Date: 8/24/2023  Authorization Period Expiration: 8/16/2024  Plan of Care Expiration: 11/17/2023  Progress Note Due: 9/24/2023  Visit # / Visits authorized: 1/ 1   FOTO: 1/3    Precautions: Standard     Time In: 1600  Time Out: 1630  Total Appointment Time (timed & untimed codes): 30 minutes    Subjective     Date of onset: chronic, recent exacerbation     History of current condition - Erich reports: pain to R>L foot exacerbated with soccer, standing on tile at work, prolonged walking, getting OOB in AM.     Falls: none    Imaging: bone scan films: FINDINGS:  No acute fracture or dislocation seen.  Well corticated ossific density at the base of the proximal phalanx 1st digit and base of the distal phalanx 1st digit may represent ossicles or related to sequela of prior trauma.     Plantar calcaneal spur.     No soft tissue edema or radiopaque retained foreign body.     Impression:     No acute osseous abnormality seen.    Prior Therapy: yes with similar complaints  Social History: Pt lives with their family in [place]  Occupation: , restaurant ownder  Prior Level of Function: I with ADL's and driving, soccer once every 2 weeks, walk 2-3 times a week, weight training with a train 1-2x/week  Current Level of Function: I with ADL's and driving. Has not played soccer since May    Pain:  Current 0/10, worst 8/10, best 0/10   Location: R>L foot to great  toe and arch  Description: Aching  Aggravating Factors: soccer, running, walking, first steps in AM, standing on tile   Easing Factors: rolling on frozen water bottle    Patients goals: relief pain, return to soccer     Medical History:   Past Medical History:   Diagnosis Date    ACL tear 3/9/2016    Asthma     Deviated septum     Genital herpes in men     Hyperlipidemia     Obesity     Tobacco use 11/28/2018       Surgical History:   Erich Chery  has a past surgical history that includes Knee surgery; Esophagogastroduodenoscopy (N/A, 7/22/2021); and Colonoscopy (N/A, 7/22/2021).    Medications:   Erich has a current medication list which includes the following prescription(s): albuterol, atorvastatin, fluticasone-salmeterol 100-50 mcg/dose, meloxicam, and sildenafil.    Allergies:   Review of patient's allergies indicates:  No Known Allergies     Objective      Observation: Pt is alert and oriented, good historian.     MMT Left Right    Ankle Plantarflexion 5/5 4/5    Ankle Dorsiflexion 5/5 5/5    Ankle Inversion 5/5 5/5    Ankle Eversion 5/5 5/5    Knee Extension 5/5 5/5    Knee Flexion 5/5 5/5    Hip Flexion 5/5 5/5    Hip External Rotation 5/5 5/5    Hip Internal Rotation 5/5 5/5        Ankle ROM  WFL grossly, but stiffness noted with dorsiflexion B    Great Toe:   -Flexion WFL  -Extension WFL - min pain along flexor hallucis tendon R    Flexibility:    Right Left  Hamstring (SLR)  mod mod  Gastroc   mild mild  Soleus    mild mild  Flexor hallicus longus  mild min    Joint Mobility: stiffness to tibiotalar/calcaneal/crural joints    Special Tests:  Tinels at anterior branch of deep peroneal: -  Tinels at posterior tibial nerve: -  Windlass: -    Balance:   SLS   Right: fair   Left: good    Gait: no significant deficits noted        Limitation/Restriction for FOTO Foot Survey    Therapist reviewed FOTO scores for Erich Chery on 8/24/2023.   FOTO documents entered into EPIC - see Media  "section.    Limitation Score: 67%    Goal score: 74%       Treatment     Total Treatment time (time-based codes) separate from Evaluation: 20 minutes      Erich received the treatments listed below:      therapeutic exercises to develop strength, ROM, and flexibility for 10 minutes including:  PF stretch with towel x 30"  Toe yoga 5x  DF on stairs 3 x 10"  DF mob kneeling with red super band 10" x 10        therapeutic activities to improve functional performance for 10  minutes, including:  Development and review of HEP to include   PF stretch 3 x 30"   Toe yoga 20x   DF mob on stairs (or chair) 10" x 10   (Included option of DF mob with band if available)      Patient Education and Home Exercises     Education provided:   - Therapy rationale and plan of care    Written Home Exercises Provided: yes. Exercises were reviewed and Erich was able to demonstrate them prior to the end of the session.  Erich demonstrated good  understanding of the education provided. See EMR under Patient Instructions for exercises provided during therapy sessions.    Assessment     Erich is a 52 y.o. male referred to outpatient Physical Therapy with a medical diagnosis of M24.573 (ICD-10-CM) - Equinus contracture of ankle M79.671 (ICD-10-CM) - Foot pain, right M72.2 (ICD-10-CM) - Plantar fasciitis . Patient presents with report of exacerbation of pain to R 1st MTP and medial arch that is preventing him from returning to playing soccer. Noted stiffness grossly to B ankles. Pain along great toe flexor tendon with palpation. Pain is prevention pt from performing his usually recreational hobbies, and is limited with tolerance to work tasks.     Patient prognosis is Good.   Patient will benefit from skilled outpatient Physical Therapy to address the deficits stated above and in the chart below, provide patient /family education, and to maximize patientt's level of independence.     Plan of care discussed with patient: Yes  Patient's spiritual, " cultural and educational needs considered and patient is agreeable to the plan of care and goals as stated below:     Anticipated Barriers for therapy: standard    Medical Necessity is demonstrated by the following  History  Co-morbidities and personal factors that may impact the plan of care Co-morbidities:        Past Medical History:   Diagnosis Date    ACL tear 3/9/2016    Asthma      Deviated septum      Genital herpes in men      Hyperlipidemia      Obesity      Tobacco use 11/28/2018         Personal Factors:   lifestyle  -previous L achilles injury  -previous L ACL surgery        moderate   Examination  Body Structures and Functions, activity limitations and participation restrictions that may impact the plan of care Body Regions:   back  lower extremities     Body Systems:    gross symmetry  ROM  strength  gross coordinated movement  balance  gait  transfers  transitions  motor control  motor learning     Participation Restrictions:   -unable to participate in ADLs and recreational tasks  -unable to play soccer  -difficulty with jogging     Activity limitations:   Learning and applying knowledge  no deficits     General Tasks and Commands  no deficits     Communication  no deficits     Mobility  walking  Difficulty running     Self care  dressing     Domestic Life  shopping  doing house work (cleaning house, washing dishes, laundry)     Interactions/Relationships  basic interpersonal interactions  relating with strangers     Life Areas  no deficits     Community and Social Life  community life  recreation and leisure             moderate   Clinical Presentation evolving clinical presentation with changing clinical characteristics moderate   Decision Making/ Complexity Score: moderate       Goals:  Short Term Goals (4 Weeks):   1. Patient to have improved B dorsiflexion PROM by 25% or greater for ease with ADL's such as stair descent  2. Patient to have improved single limb balance as noted by 10 sec or  greater ea LE for improved gait stability   3. Patient to report decreased pain in R 1st MT and arch by 40% or greater for independence with recreation and work tasks.   4. Patient will report consistent performance of stretching and HEP to maintain progress made with therapy.    Long Term Goals (12 Weeks):   1. Patient to have decreased subjective report of disability as noted by a score of 74% or greater on the FOTO ankle/foot questionnaire   2. Patient to be independent with home exercise program for improved self management of condition  3. Patient will have increased R ankle strength to 4+/5 or greater for independence with ADL's such as squatting  4. 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     Plan     Plan of care Certification: 8/24/2023 to 11/17/2023.    Outpatient Physical Therapy 2 times weekly for 12 weeks to include the following interventions: Gait Training, Iontophoresis (with dexamethasone), Manual Therapy, Moist Heat/ Ice, Neuromuscular Re-ed, Patient Education, Therapeutic Activities, Therapeutic Exercise, and Dry Needling .     Brittaney Esqueda, PT

## 2023-08-25 PROBLEM — M25.671 ANKLE JOINT STIFFNESS, BILATERAL: Status: ACTIVE | Noted: 2023-08-25

## 2023-08-25 PROBLEM — M25.672 ANKLE JOINT STIFFNESS, BILATERAL: Status: ACTIVE | Noted: 2023-08-25

## 2023-08-25 PROBLEM — R26.2 DIFFICULTY WALKING: Status: RESOLVED | Noted: 2022-10-20 | Resolved: 2023-08-25

## 2023-08-25 PROBLEM — M79.671 RIGHT FOOT PAIN: Status: ACTIVE | Noted: 2023-08-25

## 2023-08-25 PROBLEM — M25.572 PAIN IN LEFT ANKLE: Status: RESOLVED | Noted: 2022-10-20 | Resolved: 2023-08-25

## 2023-08-28 ENCOUNTER — LAB VISIT (OUTPATIENT)
Dept: LAB | Facility: OTHER | Age: 53
End: 2023-08-28
Attending: INTERNAL MEDICINE
Payer: COMMERCIAL

## 2023-08-28 DIAGNOSIS — E78.2 MIXED HYPERLIPIDEMIA: ICD-10-CM

## 2023-08-28 LAB
ALBUMIN SERPL BCP-MCNC: 4.1 G/DL (ref 3.5–5.2)
ALP SERPL-CCNC: 53 U/L (ref 55–135)
ALT SERPL W/O P-5'-P-CCNC: 44 U/L (ref 10–44)
ANION GAP SERPL CALC-SCNC: 9 MMOL/L (ref 8–16)
AST SERPL-CCNC: 28 U/L (ref 10–40)
BILIRUB SERPL-MCNC: 0.4 MG/DL (ref 0.1–1)
BUN SERPL-MCNC: 18 MG/DL (ref 6–20)
CALCIUM SERPL-MCNC: 9.5 MG/DL (ref 8.7–10.5)
CHLORIDE SERPL-SCNC: 107 MMOL/L (ref 95–110)
CHOLEST SERPL-MCNC: 184 MG/DL (ref 120–199)
CHOLEST/HDLC SERPL: 5.9 {RATIO} (ref 2–5)
CO2 SERPL-SCNC: 23 MMOL/L (ref 23–29)
CREAT SERPL-MCNC: 1.2 MG/DL (ref 0.5–1.4)
EST. GFR  (NO RACE VARIABLE): >60 ML/MIN/1.73 M^2
GLUCOSE SERPL-MCNC: 92 MG/DL (ref 70–110)
HDLC SERPL-MCNC: 31 MG/DL (ref 40–75)
HDLC SERPL: 16.8 % (ref 20–50)
LDLC SERPL CALC-MCNC: 112.2 MG/DL (ref 63–159)
NONHDLC SERPL-MCNC: 153 MG/DL
POTASSIUM SERPL-SCNC: 4.7 MMOL/L (ref 3.5–5.1)
PROT SERPL-MCNC: 6.8 G/DL (ref 6–8.4)
SODIUM SERPL-SCNC: 139 MMOL/L (ref 136–145)
TRIGL SERPL-MCNC: 204 MG/DL (ref 30–150)

## 2023-08-28 PROCEDURE — 36415 COLL VENOUS BLD VENIPUNCTURE: CPT | Performed by: INTERNAL MEDICINE

## 2023-08-28 PROCEDURE — 80061 LIPID PANEL: CPT | Performed by: INTERNAL MEDICINE

## 2023-08-28 PROCEDURE — 80053 COMPREHEN METABOLIC PANEL: CPT | Performed by: INTERNAL MEDICINE

## 2023-09-13 ENCOUNTER — CLINICAL SUPPORT (OUTPATIENT)
Dept: REHABILITATION | Facility: OTHER | Age: 53
End: 2023-09-13
Payer: COMMERCIAL

## 2023-09-13 DIAGNOSIS — M25.671 ANKLE JOINT STIFFNESS, BILATERAL: ICD-10-CM

## 2023-09-13 DIAGNOSIS — M79.671 RIGHT FOOT PAIN: Primary | ICD-10-CM

## 2023-09-13 DIAGNOSIS — M25.672 ANKLE JOINT STIFFNESS, BILATERAL: ICD-10-CM

## 2023-09-13 PROCEDURE — 97140 MANUAL THERAPY 1/> REGIONS: CPT | Mod: PN,97

## 2023-09-13 PROCEDURE — 97530 THERAPEUTIC ACTIVITIES: CPT | Mod: PN

## 2023-09-13 PROCEDURE — 97110 THERAPEUTIC EXERCISES: CPT | Mod: PN

## 2023-09-13 NOTE — PROGRESS NOTES
"[facility]   Physical Therapy Treatment Note      Name: Erich Chery  Clinic Number: [id number]    Therapy Diagnosis:   Encounter Diagnoses   Name Primary?    Right foot pain Yes    Ankle joint stiffness, bilateral      Physician: Nicolas Castanon DPM    Visit Date: 9/13/2023    Physician Orders: PT Eval and Treat   Medical Diagnosis from Referral: M24.573 (ICD-10-CM) - Equinus contracture of ankle M79.671 (ICD-10-CM) - Foot pain, right M72.2 (ICD-10-CM) - Plantar fasciitis   Evaluation Date: 8/24/2023  Authorization Period Expiration: 8/16/2024  Plan of Care Expiration: 11/17/2023  Progress Note Due: 9/24/2023  Visit # / Visits authorized: 2/20   FOTO: 1/3     Precautions: Standard      Time In: 10:15 am  Time Out: 11:00 am  Total Appointment Time (timed & untimed codes): 45 minutes      Subjective     Patient reports: he re- injured his R foot a couple weekends ago walking on the beach and playing soccer. "Felt like something tore." Walking ever since has been painful but it's getting better the last couple of days.   He was compliant with home exercise program.  Response to previous treatment: good response - compliant with exercises  Functional change: no change    Pain: 4/10  Location: right feet      Objective      Objective Measures updated at progress report unless specified.     Treatment     Erich received the treatments listed below:      therapeutic exercises to develop strength, ROM, and flexibility for 13 minutes including:  DF on stairs 10 x 5"  DF mob kneeling with red super band 10" x 10  +GS on slantboard 2 x 30"  + SS on slantboard 2 x 30"  PF stretch with towel x 30"  Toe yoga 5x      manual therapy techniques: Soft tissue Mobilization for 15 minutes, including:  + IASTM to R plantar fascia along medial arch, and medial base of heel  + STM to R plantar fascia and base of heel with passive stretching     Consider DN in future visits.    therapeutic activities to " "improve functional performance for 17 minutes, including:  + toe scrunches on archizer x 20  + ankle 4 way GTB x 20 each direction   + calf raise ball squeeze 2 x 20  + SL balance on foam 2 x 30"        Patient Education and Home Exercises       Education provided:   - Patient educated on therapy rationale with benefit of exercises.  - Patient educated on boot wear at work - trial progressing time spent without boot and judging pain levels.  - Patient educated on shoe wear.  - Patient educated to continue with current HEP - added calf stretch with understanding verbalized.      Written Home Exercises Provided: Patient instructed to cont prior HEP. Exercises were reviewed and Erich was able to demonstrate them prior to the end of the session.  Erich demonstrated good  understanding of the education provided. See Electronic Medical Record under Patient Instructions for exercises provided during therapy sessions    Assessment     IASTM and manual STM performed to R medial arch, plantar fascia, and along medial base of calcaneus with pt voicing some relief of discomfort following completion. Progressed intrinsic exercises as well as added several stretches to improve flexibility through lower L leg. Modified DF stair stretch placing R foot on higher step to allow increased anterior tibial translation of knee over toes to improve DF mobility with pt voicing appropriate level of stretch. Pt did voice some muscle fatigue with completion of resisted lateral stepping. Educated pt to slowly start weaning out of boot at work based on pain levels - go longer periods of time without boot if decreased pain. Consider DN next visit or future visits depending on pt response to each session. Will continue to progress patient each visit as tolerated focusing on improving strength and stability in L foot, improving ankle DF mobility and decrease pain levels.     Erich Is progressing well towards his goals.   Patient prognosis is Good. "     Patient will continue to benefit from skilled outpatient physical therapy to address the deficits listed in the problem list box on initial evaluation, provide pt/family education and to maximize pt's level of independence in the home and community environment.     Patient's spiritual, cultural and educational needs considered and pt agreeable to plan of care and goals.     Anticipated barriers to physical therapy: standard    Goals: updated  Short Term Goals (4 Weeks):   1. Patient to have improved B dorsiflexion PROM by 25% or greater for ease with ADL's such as stair descent. (Progressing not met)  2. Patient to have improved single limb balance as noted by 10 sec or greater ea LE for improved gait stability (Progressing not met)  3. Patient to report decreased pain in R 1st MT and arch by 40% or greater for independence with recreation and work tasks. (Progressing not met)  4. Patient will report consistent performance of stretching and HEP to maintain progress made with therapy. (Progressing not met)     Long Term Goals (12 Weeks):   1. Patient to have decreased subjective report of disability as noted by a score of 74% or greater on the FOTO ankle/foot questionnaire (Progressing not met)  2. Patient to be independent with home exercise program for improved self management of condition (Progressing not met)  3. Patient will have increased R ankle strength to 4+/5 or greater for independence with ADL's such as squatting (Progressing not met)  4. 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 (Progressing not met)    Plan     Plan of care Certification: 8/24/2023 to 11/17/2023.     Continue with current plan of care focusing on strengthening foot intrinsics, ankle stability, and improving ankle ROM (continue with DF mobilization exercises). Continue with IASTM and add in DN if pt voices desire.     Lo Chapman, PT

## 2023-09-15 ENCOUNTER — CLINICAL SUPPORT (OUTPATIENT)
Dept: REHABILITATION | Facility: OTHER | Age: 53
End: 2023-09-15
Payer: COMMERCIAL

## 2023-09-15 DIAGNOSIS — M25.671 ANKLE JOINT STIFFNESS, BILATERAL: ICD-10-CM

## 2023-09-15 DIAGNOSIS — M79.671 RIGHT FOOT PAIN: Primary | ICD-10-CM

## 2023-09-15 DIAGNOSIS — M25.672 ANKLE JOINT STIFFNESS, BILATERAL: ICD-10-CM

## 2023-09-15 PROCEDURE — 97110 THERAPEUTIC EXERCISES: CPT | Mod: PN

## 2023-09-15 PROCEDURE — 97140 MANUAL THERAPY 1/> REGIONS: CPT | Mod: PN

## 2023-09-15 PROCEDURE — 97530 THERAPEUTIC ACTIVITIES: CPT | Mod: PN

## 2023-09-15 NOTE — PROGRESS NOTES
"[facility]   Physical Therapy Treatment Note      Name: Erich Chery  Clinic Number: [id number]    Therapy Diagnosis:   Encounter Diagnoses   Name Primary?    Right foot pain Yes    Ankle joint stiffness, bilateral        Physician: Nicolas Castanon DPM    Visit Date: 9/15/2023    Physician Orders: PT Eval and Treat   Medical Diagnosis from Referral: M24.573 (ICD-10-CM) - Equinus contracture of ankle M79.671 (ICD-10-CM) - Foot pain, right M72.2 (ICD-10-CM) - Plantar fasciitis   Evaluation Date: 8/24/2023  Authorization Period Expiration: 8/16/2024  Plan of Care Expiration: 11/17/2023  Progress Note Due: 9/24/2023  Visit # / Visits authorized: 3/20   FOTO: 1/3     Precautions: Standard      Time In: 3:15 pm  Time Out: 3:55 pm  Total Appointment Time (timed & untimed codes): 40 minutes      Subjective     Patient reports: he felt pretty good after he left; some soreness later that evening. He is still wearing the boot at work but plans to take it off next week.   He was compliant with home exercise program.  Response to previous treatment: good response - compliant with exercises  Functional change: no change    Pain: 4/10  Location: right feet      Objective      Objective Measures updated at progress report unless specified.     Treatment     Erich received the treatments listed below:      therapeutic exercises to develop strength, ROM, and flexibility for 10 minutes including:  DF on stairs 10 x 5"  DF mob kneeling with red super band 10" x 10  GS on slantboard 2 x 30"  SS on slantboard 2 x 30"  PF stretch with towel x 30"  Toe yoga 5x      manual therapy techniques: Soft tissue Mobilization for 10 minutes, including:  IASTM to R plantar fascia along medial arch, and medial base of heel  STM to R plantar fascia and base of heel with passive stretching     Consider DN in future visits.    therapeutic activities to improve functional performance for 20 minutes, including:  toe " "scrunches on archizer 20 x 3"  ankle 4 way GTB x 20 each direction   calf raise ball squeeze x 30  + hesitation marches 25 ft x 4 10# KB  + walking calf raises 25 ft x 2 - started to get pain on second lap         Patient Education and Home Exercises       Education provided:   - Patient educated on therapy rationale with benefit of exercises.  - Patient educated to continue with current HEP - added ankle 4 way      Written Home Exercises Provided: Patient instructed to cont prior HEP. Exercises were reviewed and Erich was able to demonstrate them prior to the end of the session.  Erich demonstrated good  understanding of the education provided. See Electronic Medical Record under Patient Instructions for exercises provided during therapy sessions    Assessment     Pt demonstrated good tolerance with continuation of foot intrinsic and ankle strengthening exercises. Progressed with hesitation marches and ankle flips with pt having increased difficulty after second lap of ankle flips - reported discomfort along medial arch and through base of heel. Continued with IASTM and manual STM to R plantar fascia and base of calcaneus. Patient noted to have increased tension along base of 1st Met and through medial arch. Added ankle 4-way to HEP to continue strengthening ankle and maintaining mobility. Continue progressing patient each visit - consider DN next session.    Erich Is progressing well towards his goals.   Patient prognosis is Good.     Patient will continue to benefit from skilled outpatient physical therapy to address the deficits listed in the problem list box on initial evaluation, provide pt/family education and to maximize pt's level of independence in the home and community environment.     Patient's spiritual, cultural and educational needs considered and pt agreeable to plan of care and goals.     Anticipated barriers to physical therapy: standard    Goals: updated  Short Term Goals (4 Weeks):   1. Patient to " have improved B dorsiflexion PROM by 25% or greater for ease with ADL's such as stair descent. (Progressing not met)  2. Patient to have improved single limb balance as noted by 10 sec or greater ea LE for improved gait stability (Progressing not met)  3. Patient to report decreased pain in R 1st MT and arch by 40% or greater for independence with recreation and work tasks. (Progressing not met)  4. Patient will report consistent performance of stretching and HEP to maintain progress made with therapy. (Progressing not met)     Long Term Goals (12 Weeks):   1. Patient to have decreased subjective report of disability as noted by a score of 74% or greater on the FOTO ankle/foot questionnaire (Progressing not met)  2. Patient to be independent with home exercise program for improved self management of condition (Progressing not met)  3. Patient will have increased R ankle strength to 4+/5 or greater for independence with ADL's such as squatting (Progressing not met)  4. 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 (Progressing not met)    Plan     Plan of care Certification: 8/24/2023 to 11/17/2023.     Continue with current plan of care focusing on strengthening foot intrinsics, ankle stability, and improving ankle ROM (continue with DF mobilization exercises). Continue with IASTM and add in DN if pt voices desire.     Lo Chapman, PT

## 2023-09-19 ENCOUNTER — CLINICAL SUPPORT (OUTPATIENT)
Dept: REHABILITATION | Facility: OTHER | Age: 53
End: 2023-09-19
Payer: COMMERCIAL

## 2023-09-19 DIAGNOSIS — M25.672 ANKLE JOINT STIFFNESS, BILATERAL: ICD-10-CM

## 2023-09-19 DIAGNOSIS — M25.671 ANKLE JOINT STIFFNESS, BILATERAL: ICD-10-CM

## 2023-09-19 DIAGNOSIS — M79.671 RIGHT FOOT PAIN: Primary | ICD-10-CM

## 2023-09-19 PROCEDURE — 97110 THERAPEUTIC EXERCISES: CPT | Mod: PN

## 2023-09-19 PROCEDURE — 97530 THERAPEUTIC ACTIVITIES: CPT | Mod: PN,97

## 2023-09-19 PROCEDURE — 97140 MANUAL THERAPY 1/> REGIONS: CPT | Mod: PN

## 2023-09-19 NOTE — PROGRESS NOTES
"[facility]   Physical Therapy Treatment Note      Name: Erich Chery  Clinic Number: [id number]    Therapy Diagnosis:   Encounter Diagnoses   Name Primary?    Right foot pain Yes    Ankle joint stiffness, bilateral        Physician: Nicolas Castanon DPM    Visit Date: 9/19/2023    Physician Orders: PT Eval and Treat   Medical Diagnosis from Referral: M24.573 (ICD-10-CM) - Equinus contracture of ankle M79.671 (ICD-10-CM) - Foot pain, right M72.2 (ICD-10-CM) - Plantar fasciitis   Evaluation Date: 8/24/2023  Authorization Period Expiration: 8/16/2024  Plan of Care Expiration: 11/17/2023  Progress Note Due: 9/24/2023  Visit # / Visits authorized: 3/20   FOTO: 1/3     Precautions: Standard      Time In: 2:25 pm  Time Out: 3:08 pm  Total Appointment Time (timed & untimed codes): 43 minutes      Subjective     Patient reports: "I've messed up my foot several times. It still hurts mainly on the bottom of my foot, but also occasionally on the outside of the foot."    He was compliant with home exercise program.  Response to previous treatment: good response - compliant with exercises  Functional change: no change    Pain: 4/10  Location: right feet      Objective      Objective Measures updated at progress report unless specified.     Treatment     Erich received the treatments listed below:      therapeutic exercises to develop strength, ROM, and flexibility for 13 minutes including:  DF on stairs 5 x 10"  DF mob kneeling with red super band 10" x 10  GS on slantboard 1 x 60"  SS on slantboard 1 x 60"  +incline board heel raises x 15 knees straight, x 15 knees flexed    PF stretch with towel x 30"  Toe yoga 5x      manual therapy techniques: Soft tissue Mobilization for 20 minutes, including:  IASTM to R plantar fascia along medial arch, and medial base of heel with [employer]  tools today  STM to R plantar fascia and base of heel with passive stretching   8 min x vacuum/cupping STM with " "manual therapy techniques was performed to R gastroc/soleus, plantar fascia to decrease muscle tightness, increase circulation and promote healing process. The pt's skin was monitored for redness adjusting pressure as needed. The pt was instructed in possible side effects of bruising and/or soreness. Pt verbalized understanding.      Consider DN in future visits.    therapeutic activities to improve functional performance for 10 minutes, including:  toe scrunches on archizer 20 x 3"  ankle 4 way GTB x 20 each direction   calf raise ball squeeze x 30  hesitation marches 50 ft x 2 x 30# KB  +drinking birds (stationary today) x 20 x 15# KB    walking calf raises 25 ft x 2 - started to get pain on second lap         Patient Education and Home Exercises       Education provided:   - Patient educated on therapy rationale with benefit of exercises.  - Patient educated to continue with current HEP - added ankle 4 way      Written Home Exercises Provided: Patient instructed to cont prior HEP. Exercises were reviewed and Erich was able to demonstrate them prior to the end of the session.  Erich demonstrated good  understanding of the education provided. See Electronic Medical Record under Patient Instructions for exercises provided during therapy sessions    Assessment     Initiated cupping to plantar fascia and G/S complex as pt presents with marked myofascial restrictions and pt declined dry needling until next as he does not want to be sore for his trip tomorrow. Fair tolerance with cupping today. Able to resume strength and flexibility program with good tolerance. Pt presents with increased LOB and increased lateral sway, facilitating c/o pain in lateral foot.      Erich Is progressing well towards his goals.   Patient prognosis is Good.     Patient will continue to benefit from skilled outpatient physical therapy to address the deficits listed in the problem list box on initial evaluation, provide pt/family education and to " maximize pt's level of independence in the home and community environment.     Patient's spiritual, cultural and educational needs considered and pt agreeable to plan of care and goals.     Anticipated barriers to physical therapy: standard    Goals: updated  Short Term Goals (4 Weeks):   1. Patient to have improved B dorsiflexion PROM by 25% or greater for ease with ADL's such as stair descent. (Progressing not met)  2. Patient to have improved single limb balance as noted by 10 sec or greater ea LE for improved gait stability (Progressing not met)  3. Patient to report decreased pain in R 1st MT and arch by 40% or greater for independence with recreation and work tasks. (Progressing not met)  4. Patient will report consistent performance of stretching and HEP to maintain progress made with therapy. (Progressing not met)     Long Term Goals (12 Weeks):   1. Patient to have decreased subjective report of disability as noted by a score of 74% or greater on the FOTO ankle/foot questionnaire (Progressing not met)  2. Patient to be independent with home exercise program for improved self management of condition (Progressing not met)  3. Patient will have increased R ankle strength to 4+/5 or greater for independence with ADL's such as squatting (Progressing not met)  4. 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 (Progressing not met)    Plan     Plan of care Certification: 8/24/2023 to 11/17/2023.     Continue with current plan of care focusing on strengthening foot intrinsics, ankle stability, and improving ankle ROM (continue with DF mobilization exercises). Continue with IASTM and add in DN if pt voices desire.     Vika Granados, PT

## 2023-10-03 ENCOUNTER — CLINICAL SUPPORT (OUTPATIENT)
Dept: REHABILITATION | Facility: OTHER | Age: 53
End: 2023-10-03
Payer: COMMERCIAL

## 2023-10-03 DIAGNOSIS — M25.671 ANKLE JOINT STIFFNESS, BILATERAL: ICD-10-CM

## 2023-10-03 DIAGNOSIS — M25.672 ANKLE JOINT STIFFNESS, BILATERAL: ICD-10-CM

## 2023-10-03 DIAGNOSIS — M79.671 RIGHT FOOT PAIN: Primary | ICD-10-CM

## 2023-10-03 PROCEDURE — 97110 THERAPEUTIC EXERCISES: CPT | Mod: PN,97 | Performed by: PHYSICAL THERAPIST

## 2023-10-03 PROCEDURE — 97140 MANUAL THERAPY 1/> REGIONS: CPT | Mod: PN | Performed by: PHYSICAL THERAPIST

## 2023-10-03 PROCEDURE — 97530 THERAPEUTIC ACTIVITIES: CPT | Mod: PN | Performed by: PHYSICAL THERAPIST

## 2023-10-03 NOTE — PROGRESS NOTES
"[facility]   Physical Therapy Treatment Note      Name: Erich Chery  Clinic Number: [id number]    Therapy Diagnosis:   Encounter Diagnoses   Name Primary?    Right foot pain Yes    Ankle joint stiffness, bilateral        Physician: Nicolas Castanon DPM    Visit Date: 10/3/2023    Physician Orders: PT Eval and Treat   Medical Diagnosis from Referral: M24.573 (ICD-10-CM) - Equinus contracture of ankle M79.671 (ICD-10-CM) - Foot pain, right M72.2 (ICD-10-CM) - Plantar fasciitis   Evaluation Date: 8/24/2023  Authorization Period Expiration: 8/16/2024  Plan of Care Expiration: 11/17/2023  Progress Note Due: 9/24/2023   Visit # / Visits authorized: 5/20   FOTO: 1/3     Precautions: Standard      Time In: 0905  Time Out: 0945  Total Appointment Time (timed & untimed codes): 40 minutes      Subjective     Patient reports: foot is sore from his trip after doing a lot of walking    He was compliant with home exercise program.  Response to previous treatment: good response - compliant with exercises  Functional change: no change    Pain: 4/10  Location: right feet      Objective      Objective Measures updated at progress report unless specified.     Treatment     Erich received the treatments listed below:      therapeutic exercises to develop strength, ROM, and flexibility for 10 minutes including:  DF on stairs 5 x 10"  DF mob kneeling with red super band 10" x 10  GS on slantboard 3 x 30"  SS on slantboard 3 x 30"  +incline board heel raises x 15 knees straight, x 15 knees flexed    PF stretch with towel x 30"  Toe yoga 5x      manual therapy techniques: Soft tissue Mobilization for 15 minutes, including:  IASTM to R plantar fascia along medial arch, and medial base of heel with [employer]  tools today  STM to R plantar fascia and base of heel with passive stretching   8 min x vacuum/cupping STM with manual therapy techniques was performed to R gastroc/soleus, plantar fascia to decrease " "muscle tightness, increase circulation and promote healing process. The pt's skin was monitored for redness adjusting pressure as needed. The pt was instructed in possible side effects of bruising and/or soreness. Pt verbalized understanding.    15 min x Application of TDN: Pt educated on benefits and potential side effects of dry needling. Educated pt on benefits, precautions, side effects following TDN. Educated pt to use heat following treatment sessions if pt is experiencing pain or soreness. Pt verbalized good understanding of education.  Pt signed written consent to dry needling. Pt gave verbal consent for DN    Pt received dry needling to the below listed muscles using 15, 40, and 50 mm needles.  To R foot  Plantar fascia at MCT  Quadratus plantae  Flexor hallucis  Abductor digiti minimi  Medial gastroc x 2  Winding performed every 5 minutes during treatment.            therapeutic activities to improve functional performance for 15 minutes, including:  toe scrunches on archizer 20 x 3"  ankle 4 way GTB x 20 each direction   calf raise ball squeeze x 30  +double leg HR on black foam 20x  +single leg HR on black foam 20x B  hesitation marches 50 ft x 2 x 30# KB  +drinking birds (stationary today) x 20 x 15# KB    walking calf raises 25 ft x 2 - started to get pain on second lap   +Pt education including dry needling consent and procedure prior to treatment.       Patient Education and Home Exercises       Education provided:   - Patient educated on therapy rationale with benefit of exercises.  - Patient educated to continue with current HEP - added ankle 4 way      Written Home Exercises Provided: Patient instructed to cont prior HEP. Exercises were reviewed and Erich was able to demonstrate them prior to the end of the session.  Erich demonstrated good  understanding of the education provided. See Electronic Medical Record under Patient Instructions for exercises provided during therapy sessions    Assessment "     Good tolerance to therex today. Mil c/o mm fatigue with single leg heel raises but able to complete. Initiated dry needling today with pt's written and verbal consent. Good soft tissue response to dry needling evident by increased grasp with unilateral winding at all insertion points. Winding performed every 5 minutes during treatment. No adverse effects following treatment.        Erich Is progressing well towards his goals.   Patient prognosis is Good.     Patient will continue to benefit from skilled outpatient physical therapy to address the deficits listed in the problem list box on initial evaluation, provide pt/family education and to maximize pt's level of independence in the home and community environment.     Patient's spiritual, cultural and educational needs considered and pt agreeable to plan of care and goals.     Anticipated barriers to physical therapy: standard    Goals: updated  Short Term Goals (4 Weeks):   1. Patient to have improved B dorsiflexion PROM by 25% or greater for ease with ADL's such as stair descent. (Progressing not met)  2. Patient to have improved single limb balance as noted by 10 sec or greater ea LE for improved gait stability (Progressing not met)  3. Patient to report decreased pain in R 1st MT and arch by 40% or greater for independence with recreation and work tasks. (Progressing not met)  4. Patient will report consistent performance of stretching and HEP to maintain progress made with therapy. (Progressing not met)     Long Term Goals (12 Weeks):   1. Patient to have decreased subjective report of disability as noted by a score of 74% or greater on the FOTO ankle/foot questionnaire (Progressing not met)  2. Patient to be independent with home exercise program for improved self management of condition (Progressing not met)  3. Patient will have increased R ankle strength to 4+/5 or greater for independence with ADL's such as squatting (Progressing not met)  4. Patient  will report gradual return to soccer without significant exacerbation of symptoms. (Progressing not met)    Plan     Plan of care Certification: 8/24/2023 to 11/17/2023.     Continue with current plan of care focusing on strengthening foot intrinsics, ankle stability, and improving ankle ROM (continue with DF mobilization exercises). Assess response to dry needling and continue as needed.     Brittaney Esqueda, PT

## 2023-10-05 ENCOUNTER — CLINICAL SUPPORT (OUTPATIENT)
Dept: REHABILITATION | Facility: OTHER | Age: 53
End: 2023-10-05
Payer: COMMERCIAL

## 2023-10-05 DIAGNOSIS — M25.672 ANKLE JOINT STIFFNESS, BILATERAL: ICD-10-CM

## 2023-10-05 DIAGNOSIS — M25.671 ANKLE JOINT STIFFNESS, BILATERAL: ICD-10-CM

## 2023-10-05 DIAGNOSIS — M79.671 RIGHT FOOT PAIN: Primary | ICD-10-CM

## 2023-10-05 PROCEDURE — 97110 THERAPEUTIC EXERCISES: CPT | Mod: PN | Performed by: PHYSICAL THERAPIST

## 2023-10-05 PROCEDURE — 97140 MANUAL THERAPY 1/> REGIONS: CPT | Mod: PN,97 | Performed by: PHYSICAL THERAPIST

## 2023-10-05 PROCEDURE — 97530 THERAPEUTIC ACTIVITIES: CPT | Mod: PN | Performed by: PHYSICAL THERAPIST

## 2023-10-05 NOTE — PROGRESS NOTES
"[facility]   Physical Therapy Treatment Note      Name: Erich Chery  Clinic Number: [id number]    Therapy Diagnosis:   Encounter Diagnoses   Name Primary?    Right foot pain Yes    Ankle joint stiffness, bilateral        Physician: Nicolas Castanon DPM    Visit Date: 10/5/2023    Physician Orders: PT Eval and Treat   Medical Diagnosis from Referral: M24.573 (ICD-10-CM) - Equinus contracture of ankle M79.671 (ICD-10-CM) - Foot pain, right M72.2 (ICD-10-CM) - Plantar fasciitis   Evaluation Date: 8/24/2023  Authorization Period Expiration: 8/16/2024  Plan of Care Expiration: 11/17/2023  Progress Note Due: 9/24/2023   Visit # / Visits authorized: 6/20   FOTO: 1/3     Precautions: Standard      Time In: 1210  Time Out: 1250  Total Appointment Time (timed & untimed codes): 40 minutes      Subjective     Patient reports: his pain was much better for about 2 days and then started to return. Report mild calf soreness    He was compliant with home exercise program.  Response to previous treatment: relief with dry needling  Functional change: no change    Pain: 4/10  Location: right feet      Objective      Objective Measures updated at progress report unless specified.     Treatment     Erich received the treatments listed below:      therapeutic exercises to develop strength, ROM, and flexibility for 10 minutes including:  DF on stairs 5 x 10"  DF mob kneeling with red super band 10" x 10  GS on slantboard 3 x 30"  SS on slantboard 3 x 30"  +incline board heel raises x 15 knees straight, x 15 knees flexed    PF stretch with towel x 30"  Toe yoga 5x      manual therapy techniques: Soft tissue Mobilization for 15 minutes, including:  IASTM to R plantar fascia along medial arch, and medial base of heel with [employer]  tools today  STM to R plantar fascia and base of heel with passive stretching   8 min x vacuum/cupping STM with manual therapy techniques was performed to R gastroc/soleus, plantar " "fascia to decrease muscle tightness, increase circulation and promote healing process. The pt's skin was monitored for redness adjusting pressure as needed. The pt was instructed in possible side effects of bruising and/or soreness. Pt verbalized understanding.    15 min x Application of TDN: Pt educated on benefits and potential side effects of dry needling. Educated pt on benefits, precautions, side effects following TDN. Educated pt to use heat following treatment sessions if pt is experiencing pain or soreness. Pt verbalized good understanding of education.  Pt signed written consent to dry needling. Pt gave verbal consent for DN    Pt received dry needling to the below listed muscles using 15, 40, and 50 mm needles.  To R foot  Plantar fascia at MCT  Quadratus plantae  Flexor hallucis  Abductor digiti minimi  Medial gastroc x 2  Winding performed every 5 minutes during treatment.            therapeutic activities to improve functional performance for 15 minutes, including:  toe scrunches on archizer 20 x 3"  ankle 4 way GTB x 20 each direction   calf raise ball squeeze x 30  double leg HR on black foam 20x  single leg HR on black foam 20x B  hesitation marches 50 ft x 2 x 30# KB  +drinking birds (stationary today) x 2 x 10 x green band under great toe    walking calf raises 25 ft x 2 - started to get pain on second lap         Patient Education and Home Exercises       Education provided:   - Patient educated on therapy rationale with benefit of exercises.  - Patient educated to continue with current HEP - added ankle 4 way      Written Home Exercises Provided: Patient instructed to cont prior HEP. Exercises were reviewed and Erich was able to demonstrate them prior to the end of the session.  Erich demonstrated good  understanding of the education provided. See Electronic Medical Record under Patient Instructions for exercises provided during therapy sessions    Assessment     Good tolerance to treatment today. " Added resistance to great toe with drinking birds to promote arch stability with good training effect noted. Dry needling continued. Pt endorses anxiety prior to treatment, but elects to proceed and tolerated treatment well. Good soft tissue response noted at all insertion points. No adverse effects following treatment, pt reports reduction in pain on leaving clinic.         Erich Is progressing well towards his goals.   Patient prognosis is Good.     Patient will continue to benefit from skilled outpatient physical therapy to address the deficits listed in the problem list box on initial evaluation, provide pt/family education and to maximize pt's level of independence in the home and community environment.     Patient's spiritual, cultural and educational needs considered and pt agreeable to plan of care and goals.     Anticipated barriers to physical therapy: standard    Goals: updated  Short Term Goals (4 Weeks):   1. Patient to have improved B dorsiflexion PROM by 25% or greater for ease with ADL's such as stair descent. (Progressing not met)  2. Patient to have improved single limb balance as noted by 10 sec or greater ea LE for improved gait stability (Progressing not met)  3. Patient to report decreased pain in R 1st MT and arch by 40% or greater for independence with recreation and work tasks. (Progressing not met)  4. Patient will report consistent performance of stretching and HEP to maintain progress made with therapy. (Progressing not met)     Long Term Goals (12 Weeks):   1. Patient to have decreased subjective report of disability as noted by a score of 74% or greater on the FOTO ankle/foot questionnaire (Progressing not met)  2. Patient to be independent with home exercise program for improved self management of condition (Progressing not met)  3. Patient will have increased R ankle strength to 4+/5 or greater for independence with ADL's such as squatting (Progressing not met)  4. Patient will report  gradual return to soccer without significant exacerbation of symptoms. (Progressing not met)    Plan     Plan of care Certification: 8/24/2023 to 11/17/2023.     Continue with current plan of care focusing on strengthening foot intrinsics, ankle stability, and improving ankle ROM (continue with DF mobilization exercises). Assess response to dry needling and continue as needed.     Brittaney Esqueda, PT

## 2023-10-16 ENCOUNTER — CLINICAL SUPPORT (OUTPATIENT)
Dept: REHABILITATION | Facility: OTHER | Age: 53
End: 2023-10-16
Payer: COMMERCIAL

## 2023-10-16 DIAGNOSIS — M25.671 ANKLE JOINT STIFFNESS, BILATERAL: ICD-10-CM

## 2023-10-16 DIAGNOSIS — M25.672 ANKLE JOINT STIFFNESS, BILATERAL: ICD-10-CM

## 2023-10-16 DIAGNOSIS — M79.671 RIGHT FOOT PAIN: Primary | ICD-10-CM

## 2023-10-16 PROCEDURE — 97140 MANUAL THERAPY 1/> REGIONS: CPT | Mod: PN | Performed by: PHYSICAL THERAPIST

## 2023-10-16 PROCEDURE — 97530 THERAPEUTIC ACTIVITIES: CPT | Mod: PN | Performed by: PHYSICAL THERAPIST

## 2023-10-16 PROCEDURE — 97110 THERAPEUTIC EXERCISES: CPT | Mod: PN | Performed by: PHYSICAL THERAPIST

## 2023-10-16 NOTE — PROGRESS NOTES
"[facility]   Physical Therapy Treatment Note      Name: Erich Chery  Clinic Number: [id number]    Therapy Diagnosis:   Encounter Diagnoses   Name Primary?    Right foot pain Yes    Ankle joint stiffness, bilateral        Physician: Nicolas Castanon DPM    Visit Date: 10/16/2023    Physician Orders: PT Eval and Treat   Medical Diagnosis from Referral: M24.573 (ICD-10-CM) - Equinus contracture of ankle M79.671 (ICD-10-CM) - Foot pain, right M72.2 (ICD-10-CM) - Plantar fasciitis   Evaluation Date: 8/24/2023  Authorization Period Expiration: 8/16/2024  Plan of Care Expiration: 11/17/2023  Progress Note Due: 10/24/2023   Visit # / Visits authorized: 7/20   FOTO: 2/3: 10/5/2023     Precautions: Standard      Time In: 1230  Time Out: 1315  Total Appointment Time (timed & untimed codes): 45 minutes      Subjective     Patient reports: he walked all over [place] and was able to get through it. Since getting home he noticed some swelling to bottom of foot, but is improving. He says he didn't get as much from last dry needling session as he did the first time, but it was still helpful.     He was compliant with home exercise program.  Response to previous treatment: relief with dry needling  Functional change: no change    Pain: 4/10  Location: right feet      Objective      Objective Measures updated at progress report unless specified.   10/16/2023  Ankle ROM  Continues with limited DF B with springy end feel. Pain across anterior ankle in standing soleus stretch      Treatment     Erich received the treatments listed below:      therapeutic exercises to develop strength, ROM, and flexibility for 10 minutes including:  DF on stairs 5 x 10"  DF mob kneeling with red super band 10" x 10  GS on slantboard 3 x 30"  SS on slantboard 3 x 30"  +incline board heel raises x 15 knees straight, x 15 knees flexed    PF stretch with towel x 30"  Toe yoga 5x      manual therapy techniques: Soft tissue " "Mobilization for 20 minutes, including:  IASTM to R plantar fascia along medial arch, and medial base of heel with Hawks  tools today  STM to R plantar fascia and base of heel with passive stretching   8 min x vacuum/cupping STM with manual therapy techniques was performed to R gastroc/soleus, plantar fascia to decrease muscle tightness, increase circulation and promote healing process. The pt's skin was monitored for redness adjusting pressure as needed. The pt was instructed in possible side effects of bruising and/or soreness. Pt verbalized understanding.  To B ankles: talar distraction, posterior talar glides, manual DF ROM with distraction    15 min x Application of TDN: Pt educated on benefits and potential side effects of dry needling. Educated pt on benefits, precautions, side effects following TDN. Educated pt to use heat following treatment sessions if pt is experiencing pain or soreness. Pt verbalized good understanding of education.  Pt signed written consent to dry needling. Pt gave verbal consent for DN    Pt received dry needling to the below listed muscles using 15, 40, and 50 mm needles.  To R foot  Plantar fascia at MCT  Quadratus plantae  Flexor hallucis  Abductor digiti minimi  Medial gastroc x 2  Winding performed every 5 minutes during treatment.  E-stim applied through 2 channels at 2 Hz and 4 mA to tolerance.            therapeutic activities to improve functional performance for 10 minutes, including:  toe scrunches on archizer 20 x 3"  ankle 4 way GTB x 20 each direction   calf raise ball squeeze x 30  double leg HR on black foam 20x  single leg HR on black foam 20x B  hesitation marches 50 ft x 2 x 30# KB  +drinking birds (stationary today) x 2 x 10 x green band under great toe    walking calf raises 25 ft x 2 - started to get pain on second lap         Patient Education and Home Exercises       Education provided:   - Patient educated on therapy rationale with benefit of exercises.  - " Patient educated to continue with current HEP - added ankle 4 way      Written Home Exercises Provided: Patient instructed to cont prior HEP. Exercises were reviewed and Erich was able to demonstrate them prior to the end of the session.  Erich demonstrated good  understanding of the education provided. See Electronic Medical Record under Patient Instructions for exercises provided during therapy sessions    Assessment     Overall Erich is demonstrating good progress from evaluation. He continues with notable stiffness to B ankles, though some improvement with manual joint mobilizations. Dry needling continued to address soft tissue restrictions. Added low frequency electric stimulation with good tolerance.       Erich Is progressing well towards his goals.   Patient prognosis is Good.     Patient will continue to benefit from skilled outpatient physical therapy to address the deficits listed in the problem list box on initial evaluation, provide pt/family education and to maximize pt's level of independence in the home and community environment.     Patient's spiritual, cultural and educational needs considered and pt agreeable to plan of care and goals.     Anticipated barriers to physical therapy: standard    Goals: updated  Short Term Goals (4 Weeks):   1. Patient to have improved B dorsiflexion PROM by 25% or greater for ease with ADL's such as stair descent. (Progressing not met)  2. Patient to have improved single limb balance as noted by 10 sec or greater ea LE for improved gait stability (Progressing not met)  3. Patient to report decreased pain in R 1st MT and arch by 40% or greater for independence with recreation and work tasks. (Progressing not met)  4. Patient will report consistent performance of stretching and HEP to maintain progress made with therapy. (Progressing not met)     Long Term Goals (12 Weeks):   1. Patient to have decreased subjective report of disability as noted by a score of 74% or  greater on the FOTO ankle/foot questionnaire (Progressing not met)  2. Patient to be independent with home exercise program for improved self management of condition (Progressing not met)  3. Patient will have increased R ankle strength to 4+/5 or greater for independence with ADL's such as squatting (Progressing not met)  4. 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 (Progressing not met)    Plan     Plan of care Certification: 8/24/2023 to 11/17/2023.     Continue with current plan of care focusing on strengthening foot intrinsics, ankle stability, and improving ankle ROM (continue with DF mobilization exercises). Assess response to dry needling and continue as needed.     Brittaney Esqueda, PT

## 2023-10-19 ENCOUNTER — CLINICAL SUPPORT (OUTPATIENT)
Dept: REHABILITATION | Facility: OTHER | Age: 53
End: 2023-10-19
Payer: COMMERCIAL

## 2023-10-19 DIAGNOSIS — M79.671 RIGHT FOOT PAIN: Primary | ICD-10-CM

## 2023-10-19 DIAGNOSIS — M25.671 ANKLE JOINT STIFFNESS, BILATERAL: ICD-10-CM

## 2023-10-19 DIAGNOSIS — M25.672 ANKLE JOINT STIFFNESS, BILATERAL: ICD-10-CM

## 2023-10-19 PROCEDURE — 97530 THERAPEUTIC ACTIVITIES: CPT | Mod: PN | Performed by: PHYSICAL THERAPIST

## 2023-10-19 PROCEDURE — 97110 THERAPEUTIC EXERCISES: CPT | Mod: PN | Performed by: PHYSICAL THERAPIST

## 2023-10-19 PROCEDURE — 97112 NEUROMUSCULAR REEDUCATION: CPT | Mod: PN,97 | Performed by: PHYSICAL THERAPIST

## 2023-10-19 NOTE — PROGRESS NOTES
"[facility]   Physical Therapy Treatment Note      Name: Erich Chery  Clinic Number: [id number]    Therapy Diagnosis:   Encounter Diagnoses   Name Primary?    Right foot pain Yes    Ankle joint stiffness, bilateral        Physician: Nicolas Castanon DPM    Visit Date: 10/19/2023    Physician Orders: PT Eval and Treat   Medical Diagnosis from Referral: M24.573 (ICD-10-CM) - Equinus contracture of ankle M79.671 (ICD-10-CM) - Foot pain, right M72.2 (ICD-10-CM) - Plantar fasciitis   Evaluation Date: 8/24/2023  Authorization Period Expiration: 8/16/2024  Plan of Care Expiration: 11/17/2023  Progress Note Due: 10/24/2023   Visit # / Visits authorized: 8/20   FOTO: 2/3: 10/5/2023     Precautions: Standard      Time In: 1120  Time Out: 1205  Total Appointment Time (timed & untimed codes): 45 minutes      Subjective     Patient reports: he felt good relief after last session. He had brief pain yesterday, but was not lasting. Feeling good today.      He was compliant with home exercise program.  Response to previous treatment: relief with dry needling  Functional change: no change    Pain: 1/10  Location: right feet      Objective      Objective Measures updated at progress report unless specified.   10/16/2023  Ankle ROM  Continues with limited DF B with springy end feel. Pain across anterior ankle in standing soleus stretch      Treatment     Erich received the treatments listed below:      therapeutic exercises to develop strength, ROM, and flexibility for 10 minutes including:  DF on stairs 5 x 10"  DF mob kneeling with red super band 10" x 10  GS on slantboard 3 x 30"  SS on slantboard 3 x 30"  +incline board heel raises x 15 knees straight, x 15 knees flexed    PF stretch with towel x 30"  Toe yoga 5x      manual therapy techniques: Soft tissue Mobilization for 00 minutes, including:  IASTM to R plantar fascia along medial arch, and medial base of heel with [employer]  tools today  STM to " "R plantar fascia and base of heel with passive stretching   8 min x vacuum/cupping STM with manual therapy techniques was performed to R gastroc/soleus, plantar fascia to decrease muscle tightness, increase circulation and promote healing process. The pt's skin was monitored for redness adjusting pressure as needed. The pt was instructed in possible side effects of bruising and/or soreness. Pt verbalized understanding.  To B ankles: talar distraction, posterior talar glides, manual DF ROM with distraction    15 min x Application of TDN: Pt educated on benefits and potential side effects of dry needling. Educated pt on benefits, precautions, side effects following TDN. Educated pt to use heat following treatment sessions if pt is experiencing pain or soreness. Pt verbalized good understanding of education.  Pt signed written consent to dry needling. Pt gave verbal consent for DN    Pt received dry needling to the below listed muscles using 15, 40, and 50 mm needles.  To R foot  Plantar fascia at MCT  Quadratus plantae  Flexor hallucis  Abductor digiti minimi  Medial gastroc x 2  Winding performed every 5 minutes during treatment.  E-stim applied through 2 channels at 2 Hz and 4 mA to tolerance.            therapeutic activities to improve functional performance for 10 minutes, including:  toe scrunches on archizer 20 x 3"  ankle 4 way GTB x 20 each direction   calf raise ball squeeze x 30  double leg HR on black foam 30x  single leg HR on black foam 3 x 10 B  hesitation marches 50 ft x 2 x 30# KB  drinking birds (stationary today) x 2 x 10 x green band under great toe  +Sidestepping with GTB at forefoot 2 x 50ft    walking calf raises 25 ft x 2 - started to get pain on second lap     Neuromuscular reeducation  for balance, proprioception for 25 minutes, inculding:  +SLS ball toss on blue foam with yellow ball 20x each fwd/lat on B LE  +Bosu squat 20x    +Rockerboard lateral static SLS x 1 min B  +Rockerboard SLS " static x 1 min, dynamic x20      Patient Education and Home Exercises       Education provided:   - Patient educated on therapy rationale with benefit of exercises.  - Patient educated to continue with current HEP - added ankle 4 way      Written Home Exercises Provided: Patient instructed to cont prior HEP. Exercises were reviewed and Erich was able to demonstrate them prior to the end of the session.  Erich demonstrated good  understanding of the education provided. See Electronic Medical Record under Patient Instructions for exercises provided during therapy sessions    Assessment     Dry needling held per pt request as symptoms are decreased today. Progression of therex for ankle and foot intrinsic stability with good training effect noted. Mild fatigue with dynamic rockerboard, but able to complete with brief rest. Good stability noted with ball toss.       Erich Is progressing well towards his goals.   Patient prognosis is Good.     Patient will continue to benefit from skilled outpatient physical therapy to address the deficits listed in the problem list box on initial evaluation, provide pt/family education and to maximize pt's level of independence in the home and community environment.     Patient's spiritual, cultural and educational needs considered and pt agreeable to plan of care and goals.     Anticipated barriers to physical therapy: standard    Goals: updated  Short Term Goals (4 Weeks):   1. Patient to have improved B dorsiflexion PROM by 25% or greater for ease with ADL's such as stair descent. (Progressing not met)  2. Patient to have improved single limb balance as noted by 10 sec or greater ea LE for improved gait stability (Progressing not met)  3. Patient to report decreased pain in R 1st MT and arch by 40% or greater for independence with recreation and work tasks. (Progressing not met)  4. Patient will report consistent performance of stretching and HEP to maintain progress made with therapy.  (Progressing not met)     Long Term Goals (12 Weeks):   1. Patient to have decreased subjective report of disability as noted by a score of 74% or greater on the FOTO ankle/foot questionnaire (Progressing not met)  2. Patient to be independent with home exercise program for improved self management of condition (Progressing not met)  3. Patient will have increased R ankle strength to 4+/5 or greater for independence with ADL's such as squatting (Progressing not met)  4. 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 (Progressing not met)    Plan     Plan of care Certification: 8/24/2023 to 11/17/2023.     Continue with current plan of care focusing on strengthening foot intrinsics, ankle stability, and improving ankle ROM (continue with DF mobilization exercises). Assess response to dry needling and continue as needed.     Brittaney Esqueda, PT

## 2023-10-25 ENCOUNTER — CLINICAL SUPPORT (OUTPATIENT)
Dept: REHABILITATION | Facility: OTHER | Age: 53
End: 2023-10-25
Payer: COMMERCIAL

## 2023-10-25 DIAGNOSIS — M79.671 RIGHT FOOT PAIN: Primary | ICD-10-CM

## 2023-10-25 DIAGNOSIS — M25.672 ANKLE JOINT STIFFNESS, BILATERAL: ICD-10-CM

## 2023-10-25 DIAGNOSIS — M25.671 ANKLE JOINT STIFFNESS, BILATERAL: ICD-10-CM

## 2023-10-25 PROCEDURE — 97530 THERAPEUTIC ACTIVITIES: CPT | Mod: PN,97 | Performed by: PHYSICAL THERAPIST

## 2023-10-25 PROCEDURE — 97112 NEUROMUSCULAR REEDUCATION: CPT | Mod: PN | Performed by: PHYSICAL THERAPIST

## 2023-10-25 PROCEDURE — 97140 MANUAL THERAPY 1/> REGIONS: CPT | Mod: PN | Performed by: PHYSICAL THERAPIST

## 2023-10-25 NOTE — PROGRESS NOTES
"[facility]   Physical Therapy Treatment Note      Name: Erich Chery  Clinic Number: [id number]    Therapy Diagnosis:   Encounter Diagnoses   Name Primary?    Right foot pain Yes    Ankle joint stiffness, bilateral        Physician: Nicolas Castanon DPM    Visit Date: 10/25/2023    Physician Orders: PT Eval and Treat   Medical Diagnosis from Referral: M24.573 (ICD-10-CM) - Equinus contracture of ankle M79.671 (ICD-10-CM) - Foot pain, right M72.2 (ICD-10-CM) - Plantar fasciitis   Evaluation Date: 8/24/2023  Authorization Period Expiration: 8/16/2024  Plan of Care Expiration: 11/17/2023  Progress Note Due: 10/24/2023   Visit # / Visits authorized: 8/20   FOTO: 2/3: 10/5/2023     Precautions: Standard      Time In: 0925  Time Out: 1005  Total Appointment Time (timed & untimed codes): 40 minutes      Subjective     Patient reports: doing well. 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     He was compliant with home exercise program.  Response to previous treatment: relief with dry needling  Functional change: no change    Pain: 1/10  Location: right feet      Objective      Objective Measures updated at progress report unless specified.   10/16/2023  Ankle ROM  Continues with limited DF B with springy end feel. Pain across anterior ankle in standing soleus stretch      Treatment     Erich received the treatments listed below:      therapeutic exercises to develop strength, ROM, and flexibility for 2 minutes including:  DF on stairs 5 x 10"  DF mob kneeling with red super band 10" x 10  GS on slantboard 3 x 30"  Soleus on slantboard 3 x 30"  +incline board heel raises x 15 knees straight, x 15 knees flexed    PF stretch with towel x 30"  Toe yoga 5x      manual therapy techniques: Soft tissue Mobilization for 10 minutes, including:    IASTM with MFR tools to B lower legs, followed by manual gastroc/achilles release, talar " "distraction into DF    0 min x vacuum/cupping STM with manual therapy techniques was performed to R gastroc/soleus, plantar fascia to decrease muscle tightness, increase circulation and promote healing process. The pt's skin was monitored for redness adjusting pressure as needed. The pt was instructed in possible side effects of bruising and/or soreness. Pt verbalized understanding.  To B ankles: talar distraction, posterior talar glides, manual DF ROM with distraction    0 min x Application of TDN: Pt educated on benefits and potential side effects of dry needling. Educated pt on benefits, precautions, side effects following TDN. Educated pt to use heat following treatment sessions if pt is experiencing pain or soreness. Pt verbalized good understanding of education.  Pt signed written consent to dry needling. Pt gave verbal consent for DN    Pt received dry needling to the below listed muscles using 15, 40, and 50 mm needles.  To R foot  Plantar fascia at MCT  Quadratus plantae  Flexor hallucis  Abductor digiti minimi  Medial gastroc x 2  Winding performed every 5 minutes during treatment.  E-stim applied through 2 channels at 2 Hz and 4 mA to tolerance.            therapeutic activities to improve functional performance for 18 minutes, including:  toe scrunches on archizer 20 x 3"  ankle 4 way GTB x 20 each direction   calf raise ball squeeze x 30  double leg HR on black foam 30x  single leg HR on black foam 3 x 10 B  hesitation marches 50 ft x 2 x 30# KB  drinking birds (stationary today) x 2 x 10 x green band under great toe  Dynamic Warm up 2 x 50ft   Hesitation march   Ankle flip   Drinking bird   Walking lunge  Sidestepping with BTB at forefoot 2 x 50ft    walking calf raises 25 ft x 2 - started to get pain on second lap     Neuromuscular reeducation  for balance, proprioception for 10 minutes, inculding:  SLS ball toss on black oval with blue ball 30x each fwd/15 x each lat on B LE  Bosu squat " "20x    Rockerboard lateral static SLS x 1 min B  Rockerboard SLS static x 1 min, dynamic x20  +SLS on wobble board 3 x 30" each      Patient Education and Home Exercises       Education provided:   - Patient educated on therapy rationale with benefit of exercises.  - Patient educated to continue with current HEP - added ankle 4 way      Written Home Exercises Provided: Patient instructed to cont prior HEP. Exercises were reviewed and Erich was able to demonstrate them prior to the end of the session.  Erich demonstrated good  understanding of the education provided. See Electronic Medical Record under Patient Instructions for exercises provided during therapy sessions    Assessment     IASTM performed today to improve soft tissue mobility through posterior lower leg B, good improvement noted in soft tissue and joint mobility following manual interventions. Good tolerance to progression of dynamic activities. Progressed SLS to wobble board with intermittent fingertip support required.       Erich Is progressing well towards his goals.   Patient prognosis is Good.     Patient will continue to benefit from skilled outpatient physical therapy to address the deficits listed in the problem list box on initial evaluation, provide pt/family education and to maximize pt's level of independence in the home and community environment.     Patient's spiritual, cultural and educational needs considered and pt agreeable to plan of care and goals.     Anticipated barriers to physical therapy: standard    Goals: updated  Short Term Goals (4 Weeks):   1. Patient to have improved B dorsiflexion PROM by 25% or greater for ease with ADL's such as stair descent. (Progressing not met)  2. Patient to have improved single limb balance as noted by 10 sec or greater ea LE for improved gait stability (Progressing not met)  3. Patient to report decreased pain in R 1st MT and arch by 40% or greater for independence with recreation and work tasks. " (Progressing not met)  4. Patient will report consistent performance of stretching and HEP to maintain progress made with therapy. (Progressing not met)     Long Term Goals (12 Weeks):   1. Patient to have decreased subjective report of disability as noted by a score of 74% or greater on the FOTO ankle/foot questionnaire (Progressing not met)  2. Patient to be independent with home exercise program for improved self management of condition (Progressing not met)  3. Patient will have increased R ankle strength to 4+/5 or greater for independence with ADL's such as squatting (Progressing not met)  4. 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 (Progressing not met)    Plan     Plan of care Certification: 8/24/2023 to 11/17/2023.     Continue with current plan of care focusing on strengthening foot intrinsics, ankle stability, and improving ankle ROM (continue with DF mobilization exercises). Assess response to dry needling and continue as needed.     Brittaney Esqueda, PT

## 2023-10-25 NOTE — PROGRESS NOTES
"[facility]   Physical Therapy Treatment Note      Name: Erich hCery  Clinic Number: [id number]    Therapy Diagnosis:   No diagnosis found.      Physician: Nicolas Castanon DPM    Visit Date: 10/25/2023    Physician Orders: PT Eval and Treat   Medical Diagnosis from Referral: M24.573 (ICD-10-CM) - Equinus contracture of ankle M79.671 (ICD-10-CM) - Foot pain, right M72.2 (ICD-10-CM) - Plantar fasciitis   Evaluation Date: 8/24/2023  Authorization Period Expiration: 8/16/2024  Plan of Care Expiration: 11/17/2023  Progress Note Due: 10/24/2023   Visit # / Visits authorized: 8/20   FOTO: 2/3: 10/5/2023     Precautions: Standard      Time In: 1120  Time Out: 1205  Total Appointment Time (timed & untimed codes): 45 minutes      Subjective     Patient reports: he felt good relief after last session. He had brief pain yesterday, but was not lasting. Feeling good today.      He was compliant with home exercise program.  Response to previous treatment: relief with dry needling  Functional change: no change    Pain: 1/10  Location: right feet      Objective      Objective Measures updated at progress report unless specified.   10/16/2023  Ankle ROM  Continues with limited DF B with springy end feel. Pain across anterior ankle in standing soleus stretch      Treatment     Erich received the treatments listed below:      therapeutic exercises to develop strength, ROM, and flexibility for 10 minutes including:  DF on stairs 5 x 10"  DF mob kneeling with red super band 10" x 10  GS on slantboard 3 x 30"  SS on slantboard 3 x 30"  +incline board heel raises x 15 knees straight, x 15 knees flexed    PF stretch with towel x 30"  Toe yoga 5x      manual therapy techniques: Soft tissue Mobilization for 00 minutes, including:  IASTM to R plantar fascia along medial arch, and medial base of heel with Hawks  tools today  STM to R plantar fascia and base of heel with passive stretching   8 min x " "vacuum/cupping STM with manual therapy techniques was performed to R gastroc/soleus, plantar fascia to decrease muscle tightness, increase circulation and promote healing process. The pt's skin was monitored for redness adjusting pressure as needed. The pt was instructed in possible side effects of bruising and/or soreness. Pt verbalized understanding.  To B ankles: talar distraction, posterior talar glides, manual DF ROM with distraction    15 min x Application of TDN: Pt educated on benefits and potential side effects of dry needling. Educated pt on benefits, precautions, side effects following TDN. Educated pt to use heat following treatment sessions if pt is experiencing pain or soreness. Pt verbalized good understanding of education.  Pt signed written consent to dry needling. Pt gave verbal consent for DN    Pt received dry needling to the below listed muscles using 15, 40, and 50 mm needles.  To R foot  Plantar fascia at MCT  Quadratus plantae  Flexor hallucis  Abductor digiti minimi  Medial gastroc x 2  Winding performed every 5 minutes during treatment.  E-stim applied through 2 channels at 2 Hz and 4 mA to tolerance.            therapeutic activities to improve functional performance for 10 minutes, including:  toe scrunches on archizer 20 x 3"  ankle 4 way GTB x 20 each direction   calf raise ball squeeze x 30  double leg HR on black foam 30x  single leg HR on black foam 3 x 10 B  hesitation marches 50 ft x 2 x 30# KB  drinking birds (stationary today) x 2 x 10 x green band under great toe  +Sidestepping with GTB at forefoot 2 x 50ft    walking calf raises 25 ft x 2 - started to get pain on second lap     Neuromuscular reeducation  for balance, proprioception for 25 minutes, inculding:  +SLS ball toss on blue foam with yellow ball 20x each fwd/lat on B LE  +Bosu squat 20x    +Rockerboard lateral static SLS x 1 min B  +Rockerboard SLS static x 1 min, dynamic x20      Patient Education and Home Exercises   "     Education provided:   - Patient educated on therapy rationale with benefit of exercises.  - Patient educated to continue with current HEP - added ankle 4 way      Written Home Exercises Provided: Patient instructed to cont prior HEP. Exercises were reviewed and Erich was able to demonstrate them prior to the end of the session.  Erich demonstrated good  understanding of the education provided. See Electronic Medical Record under Patient Instructions for exercises provided during therapy sessions    Assessment     Dry needling held per pt request as symptoms are decreased today. Progression of therex for ankle and foot intrinsic stability with good training effect noted. Mild fatigue with dynamic rockerboard, but able to complete with brief rest. Good stability noted with ball toss.       Erich Is progressing well towards his goals.   Patient prognosis is Good.     Patient will continue to benefit from skilled outpatient physical therapy to address the deficits listed in the problem list box on initial evaluation, provide pt/family education and to maximize pt's level of independence in the home and community environment.     Patient's spiritual, cultural and educational needs considered and pt agreeable to plan of care and goals.     Anticipated barriers to physical therapy: standard    Goals: updated  Short Term Goals (4 Weeks):   1. Patient to have improved B dorsiflexion PROM by 25% or greater for ease with ADL's such as stair descent. (Progressing not met)  2. Patient to have improved single limb balance as noted by 10 sec or greater ea LE for improved gait stability (Progressing not met)  3. Patient to report decreased pain in R 1st MT and arch by 40% or greater for independence with recreation and work tasks. (Progressing not met)  4. Patient will report consistent performance of stretching and HEP to maintain progress made with therapy. (Progressing not met)     Long Term Goals (12 Weeks):   1. Patient to  have decreased subjective report of disability as noted by a score of 74% or greater on the FOTO ankle/foot questionnaire (Progressing not met)  2. Patient to be independent with home exercise program for improved self management of condition (Progressing not met)  3. Patient will have increased R ankle strength to 4+/5 or greater for independence with ADL's such as squatting (Progressing not met)  4. 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 (Progressing not met)    Plan     Plan of care Certification: 8/24/2023 to 11/17/2023.     Continue with current plan of care focusing on strengthening foot intrinsics, ankle stability, and improving ankle ROM (continue with DF mobilization exercises). Assess response to dry needling and continue as needed.     Patsy Camargo, PTA

## 2023-10-31 ENCOUNTER — CLINICAL SUPPORT (OUTPATIENT)
Dept: REHABILITATION | Facility: OTHER | Age: 53
End: 2023-10-31
Payer: COMMERCIAL

## 2023-10-31 DIAGNOSIS — M79.671 RIGHT FOOT PAIN: Primary | ICD-10-CM

## 2023-10-31 DIAGNOSIS — M25.671 ANKLE JOINT STIFFNESS, BILATERAL: ICD-10-CM

## 2023-10-31 DIAGNOSIS — M25.672 ANKLE JOINT STIFFNESS, BILATERAL: ICD-10-CM

## 2023-10-31 PROCEDURE — 97110 THERAPEUTIC EXERCISES: CPT | Mod: PN,97 | Performed by: PHYSICAL THERAPIST

## 2023-10-31 PROCEDURE — 97530 THERAPEUTIC ACTIVITIES: CPT | Mod: PN,97 | Performed by: PHYSICAL THERAPIST

## 2023-10-31 PROCEDURE — 97140 MANUAL THERAPY 1/> REGIONS: CPT | Mod: PN,97 | Performed by: PHYSICAL THERAPIST

## 2023-10-31 NOTE — PROGRESS NOTES
"[facility]   Physical Therapy Treatment Note      Name: Erich Chery  Clinic Number: [id number]    Therapy Diagnosis:   Encounter Diagnoses   Name Primary?    Right foot pain Yes    Ankle joint stiffness, bilateral        Physician: Nicolas Castanon DPM    Visit Date: 10/31/2023    Physician Orders: PT Eval and Treat   Medical Diagnosis from Referral: M24.573 (ICD-10-CM) - Equinus contracture of ankle M79.671 (ICD-10-CM) - Foot pain, right M72.2 (ICD-10-CM) - Plantar fasciitis   Evaluation Date: 8/24/2023  Authorization Period Expiration: 8/16/2024  Plan of Care Expiration: 11/17/2023  Progress Note Due: 11/17/2023   Visit # / Visits authorized: 9/20   FOTO: 2/3: 10/5/2023     Precautions: Standard      Time In: 0825   Time Out: 0900  Total Appointment Time (timed & untimed codes): 35 minutes      Subjective     Patient reports: he feels a little sore today after being active last night, but not too bad.     He was compliant with home exercise program.  Response to previous treatment: relief with dry needling  Functional change: no change    Pain: 1/10  Location: right feet      Objective      Objective Measures updated at progress report unless specified.   10/16/2023  Ankle ROM  Continues with limited DF B with springy end feel. Pain across anterior ankle in standing soleus stretch        CMS Impairment/Limitation/Restriction for FOTO Foot Survey    Therapist reviewed FOTO scores for Erich Chery on 10/31/2023   FOTO documents entered into [employer] - see Media section.    Evaluation: 67%    Current : 69%    Goal: 74%           Treatment     Erich received the treatments listed below:      therapeutic exercises to develop strength, ROM, and flexibility for 5 minutes including:  DF on stairs 5 x 10"  DF mob kneeling with red super band 10" x 10  GS on slantboard 3 x 30"  Soleus on slantboard 3 x 30"  +incline board heel raises x 15 knees straight, x 15 knees flexed    PF stretch " "with towel x 30"  Toe yoga 5x      manual therapy techniques: Soft tissue Mobilization for 20 minutes, including:    IASTM with MFR tools to B lower legs, followed by manual gastroc/achilles release, talar distraction into DF    0 min x vacuum/cupping STM with manual therapy techniques was performed to R gastroc/soleus, plantar fascia to decrease muscle tightness, increase circulation and promote healing process. The pt's skin was monitored for redness adjusting pressure as needed. The pt was instructed in possible side effects of bruising and/or soreness. Pt verbalized understanding.  To B ankles: talar distraction, posterior talar glides, manual DF ROM with distraction    0 min x Application of TDN: Pt educated on benefits and potential side effects of dry needling. Educated pt on benefits, precautions, side effects following TDN. Educated pt to use heat following treatment sessions if pt is experiencing pain or soreness. Pt verbalized good understanding of education.  Pt signed written consent to dry needling. Pt gave verbal consent for DN    Pt received dry needling to the below listed muscles using 15, 40, and 50 mm needles.  To R foot  Plantar fascia at MCT  Quadratus plantae  Flexor hallucis  Abductor digiti minimi  Medial gastroc x 2  Winding performed every 5 minutes during treatment.  E-stim applied through 2 channels at 2 Hz and 4 mA to tolerance.            therapeutic activities to improve functional performance for 10 minutes, including:  toe scrunches on archizer 20 x 3"  ankle 4 way GTB x 20 each direction   calf raise ball squeeze x 30  double leg HR on black foam 30x  single leg HR on black foam 2 x 10 B  hesitation marches 50 ft x 2 x 30# KB  drinking birds (stationary today) x 2 x 10 x green band under great toe  Dynamic Warm up 2 x 50ft   Hesitation march   Ankle flip   Drinking bird   Walking lunge  Sidestepping with BTB at forefoot 2 x 50ft    walking calf raises 25 ft x 2 - started to get " "pain on second lap     Neuromuscular reeducation  for balance, proprioception for 00 minutes, inculding:  SLS ball toss on black oval with blue ball 30x each fwd/15 x each lat on B LE  Bosu squat 20x    Rockerboard lateral static SLS x 1 min B  Rockerboard SLS static x 1 min, dynamic x20  +SLS on wobble board 3 x 30" each      Patient Education and Home Exercises       Education provided:   - Patient educated on therapy rationale with benefit of exercises.  - Patient educated to continue with current HEP - added ankle 4 way      Written Home Exercises Provided: Patient instructed to cont prior HEP. Exercises were reviewed and Erich was able to demonstrate them prior to the end of the session.  Erich demonstrated good  understanding of the education provided. See Electronic Medical Record under Patient Instructions for exercises provided during therapy sessions    Assessment     IASTM continued today per pt request. Pt continues with tissue fibrosis noted, particularly to medial gastroc/soleus. Gradual improvement noted to dorsiflexion with joint mobilization.       Erich Is progressing well towards his goals.   Patient prognosis is Good.     Patient will continue to benefit from skilled outpatient physical therapy to address the deficits listed in the problem list box on initial evaluation, provide pt/family education and to maximize pt's level of independence in the home and community environment.     Patient's spiritual, cultural and educational needs considered and pt agreeable to plan of care and goals.     Anticipated barriers to physical therapy: standard    Goals: updated  Short Term Goals (4 Weeks):   1. Patient to have improved B dorsiflexion PROM by 25% or greater for ease with ADL's such as stair descent. (Progressing not met)  2. Patient to have improved single limb balance as noted by 10 sec or greater ea LE for improved gait stability (Progressing not met)  3. Patient to report decreased pain in R 1st MT " and arch by 40% or greater for independence with recreation and work tasks. (Progressing not met)  4. Patient will report consistent performance of stretching and HEP to maintain progress made with therapy. (Progressing not met)     Long Term Goals (12 Weeks):   1. Patient to have decreased subjective report of disability as noted by a score of 74% or greater on the FOTO ankle/foot questionnaire (Progressing not met)  2. Patient to be independent with home exercise program for improved self management of condition (Progressing not met)  3. Patient will have increased R ankle strength to 4+/5 or greater for independence with ADL's such as squatting (Progressing not met)  4. 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 (Progressing not met)    Plan     Plan of care Certification: 8/24/2023 to 11/17/2023.     Continue with current plan of care focusing on strengthening foot intrinsics, ankle stability, and improving ankle ROM (continue with DF mobilization exercises). Assess response to dry needling and continue as needed.     Brittaney Esqueda, PT

## 2023-11-02 ENCOUNTER — CLINICAL SUPPORT (OUTPATIENT)
Dept: REHABILITATION | Facility: OTHER | Age: 53
End: 2023-11-02
Payer: COMMERCIAL

## 2023-11-02 DIAGNOSIS — M25.672 ANKLE JOINT STIFFNESS, BILATERAL: ICD-10-CM

## 2023-11-02 DIAGNOSIS — M79.671 RIGHT FOOT PAIN: Primary | ICD-10-CM

## 2023-11-02 DIAGNOSIS — M25.671 ANKLE JOINT STIFFNESS, BILATERAL: ICD-10-CM

## 2023-11-02 PROCEDURE — 97530 THERAPEUTIC ACTIVITIES: CPT | Mod: PN

## 2023-11-02 PROCEDURE — 97110 THERAPEUTIC EXERCISES: CPT | Mod: PN,97

## 2023-11-02 PROCEDURE — 97112 NEUROMUSCULAR REEDUCATION: CPT | Mod: PN

## 2023-11-02 NOTE — PROGRESS NOTES
"[facility]   Physical Therapy Treatment Note      Name: Erich Chery  Clinic Number: [id number]    Therapy Diagnosis:   Encounter Diagnoses   Name Primary?    Right foot pain Yes    Ankle joint stiffness, bilateral        Physician: Nicolas Castanon DPM    Visit Date: 11/2/2023    Physician Orders: PT Eval and Treat   Medical Diagnosis from Referral: M24.573 (ICD-10-CM) - Equinus contracture of ankle M79.671 (ICD-10-CM) - Foot pain, right M72.2 (ICD-10-CM) - Plantar fasciitis   Evaluation Date: 8/24/2023  Authorization Period Expiration: 8/16/2024  Plan of Care Expiration: 11/17/2023  Progress Note Due: 11/17/2023   Visit # / Visits authorized: 9/20   FOTO: 2/3: 10/5/2023     Precautions: Standard      Time In: 2:23 pm   Time Out: 3:03 pm  Total Appointment Time (timed & untimed codes): 40 minutes      Subjective     Patient reports: he's feeling pretty good today. Only complaint is some lateral foot pain right along base of 5th MET.     He was compliant with home exercise program.  Response to previous treatment: relief with dry needling  Functional change: no change    Pain: 0/10 at rest; 2/10 on feet.   Location: right feet      Objective      Objective Measures updated at progress report unless specified.   10/16/2023  Ankle ROM  Continues with limited DF B with springy end feel. Pain across anterior ankle in standing soleus stretch        CMS Impairment/Limitation/Restriction for FOTO Foot Survey    Therapist reviewed FOTO scores for Erich Chery on 10/31/2023   FOTO documents entered into [employer] - see Media section.    Evaluation: 67%    Current : 69%    Goal: 74%           Treatment     Erich received the treatments listed below:      therapeutic exercises to develop strength, ROM, and flexibility for 8 minutes including:  DF on stairs 5 x 10"  DF mob kneeling with red super band 10" x 10  GS on slantboard 2 x 30"  Soleus on slantboard 2 x 30"  + s/l ankle eversion + " "inversion x 20 each - 5# ankle weight - leg up on bolster   incline board heel raises x 15 knees straight, x 15 knees flexed  PF stretch with towel x 30"  Toe yoga 5x      manual therapy techniques: Soft tissue Mobilization for 5 minutes, including:    IASTM with MFR tools to B lower legs, followed by manual gastroc/achilles release, talar distraction into DF    0 min x vacuum/cupping STM with manual therapy techniques was performed to R gastroc/soleus, plantar fascia to decrease muscle tightness, increase circulation and promote healing process. The pt's skin was monitored for redness adjusting pressure as needed. The pt was instructed in possible side effects of bruising and/or soreness. Pt verbalized understanding.  To B ankles: talar distraction, posterior talar glides, manual DF ROM with distraction    0 min x Application of TDN: Pt educated on benefits and potential side effects of dry needling. Educated pt on benefits, precautions, side effects following TDN. Educated pt to use heat following treatment sessions if pt is experiencing pain or soreness. Pt verbalized good understanding of education.  Pt signed written consent to dry needling. Pt gave verbal consent for DN    Pt received dry needling to the below listed muscles using 15, 40, and 50 mm needles.  To R foot  Plantar fascia at MCT  Quadratus plantae  Flexor hallucis  Abductor digiti minimi  Medial gastroc x 2  Winding performed every 5 minutes during treatment.  E-stim applied through 2 channels at 2 Hz and 4 mA to tolerance.            therapeutic activities to improve functional performance for 17 minutes, including:  + education on trial of jogging / soccer drills to assess pain levels and progress.   double leg HR on black foam 30x - added weighted green ball   single leg HR on black foam 2 x 10 B  Dynamic Warm up 2 x 50ft   Hesitation march - added 15# KB   Ankle flip - added 15# KB  Sidestepping with BTB at forefoot 2 x 50 ft  +Monster Walk RTB " "at forefoot 2 x 50 ft   + lateral cone shuffles 25 ft x 8  + Clamshells RTB - SL on bosu, other foot up on wall 2 x 15 both sides   Drinking bird   Walking lunge    walking calf raises 25 ft x 2 - started to get pain on second lap   toe scrunches on archizer 20 x 3"  ankle 4 way GTB x 20 each direction   calf raise ball squeeze x 30      Neuromuscular reeducation  for balance, proprioception for 10 minutes, inculding:  SL ball toss on black foam (R LE only) x 20 forward - heavy blue ball  + SL ball toss on black foam ( RLE only) x 20 lateral both sides - heavy blue ball  + trampoline lateral hop w/ sustained hold on R LE 2 x 20 x 3" hold  Bosu squat 20x  Rockerboard lateral static SLS x 1 min B  Rockerboard SLS static x 1 min, dynamic x20  SLS on wobble board 3 x 30" each      Patient Education and Home Exercises       Education provided:   - Patient educated on therapy rationale with benefit of exercises.  - Patient educated on doing a trial of soccer drills / jogging to eventually progress towards return to running and playing soccer.       Written Home Exercises Provided: Patient instructed to cont prior HEP. Exercises were reviewed and Erich was able to demonstrate them prior to the end of the session.  Erich demonstrated good  understanding of the education provided. See Electronic Medical Record under Patient Instructions for exercises provided during therapy sessions    Assessment     Incorporated higher level dynamic exercises today to assess progress and begin working towards return to soccer and running. Patient demonstrates slight wobble with SL BOSU clamshells but able to maintain upright position with no LOB. Good ankle stability noted with SL ball toss and lateral hops on trampoline - no increased pain noted with activity. Added in lateral shuffles to simulate soccer drills with pt showing good push off and controlled stop with R foot. Continued with IASTM per pt request with notable tissue fibrosis " along gastroc. Did educate to trial jogging and light soccer drills over the next several days to assess pain response and progressions with therapy. Plan to progress patient as able next visit.         Erich Is progressing well towards his goals.   Patient prognosis is Good.     Patient will continue to benefit from skilled outpatient physical therapy to address the deficits listed in the problem list box on initial evaluation, provide pt/family education and to maximize pt's level of independence in the home and community environment.     Patient's spiritual, cultural and educational needs considered and pt agreeable to plan of care and goals.     Anticipated barriers to physical therapy: standard    Goals: updated  Short Term Goals (4 Weeks):   1. Patient to have improved B dorsiflexion PROM by 25% or greater for ease with ADL's such as stair descent. (Progressing not met)  2. Patient to have improved single limb balance as noted by 10 sec or greater ea LE for improved gait stability (Progressing not met)  3. Patient to report decreased pain in R 1st MT and arch by 40% or greater for independence with recreation and work tasks. (Progressing not met)  4. Patient will report consistent performance of stretching and HEP to maintain progress made with therapy. (Progressing not met)     Long Term Goals (12 Weeks):   1. Patient to have decreased subjective report of disability as noted by a score of 74% or greater on the FOTO ankle/foot questionnaire (Progressing not met)  2. Patient to be independent with home exercise program for improved self management of condition (Progressing not met)  3. Patient will have increased R ankle strength to 4+/5 or greater for independence with ADL's such as squatting (Progressing not met)  4. 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 (Progressing not met)    Plan     Plan of care Certification: 8/24/2023 to 11/17/2023.     Continue with  current plan of care focusing on strengthening foot intrinsics, ankle stability, and improving ankle ROM (continue with DF mobilization exercises). Continue with IASTM as needed.  Lo Chapman, PT

## 2023-11-07 ENCOUNTER — CLINICAL SUPPORT (OUTPATIENT)
Dept: REHABILITATION | Facility: OTHER | Age: 53
End: 2023-11-07
Payer: COMMERCIAL

## 2023-11-07 DIAGNOSIS — M25.671 ANKLE JOINT STIFFNESS, BILATERAL: ICD-10-CM

## 2023-11-07 DIAGNOSIS — M79.671 RIGHT FOOT PAIN: Primary | ICD-10-CM

## 2023-11-07 DIAGNOSIS — M25.672 ANKLE JOINT STIFFNESS, BILATERAL: ICD-10-CM

## 2023-11-07 PROCEDURE — 97530 THERAPEUTIC ACTIVITIES: CPT | Mod: PN,97 | Performed by: PHYSICAL THERAPIST

## 2023-11-07 NOTE — PROGRESS NOTES
"[facility]   Physical Therapy Treatment Note      Name: Erich Chery  Clinic Number: [id number]    Therapy Diagnosis:   Encounter Diagnoses   Name Primary?    Right foot pain Yes    Ankle joint stiffness, bilateral        Physician: Nicolas Castanon DPM    Visit Date: 11/7/2023    Physician Orders: PT Eval and Treat   Medical Diagnosis from Referral: M24.573 (ICD-10-CM) - Equinus contracture of ankle M79.671 (ICD-10-CM) - Foot pain, right M72.2 (ICD-10-CM) - Plantar fasciitis   Evaluation Date: 8/24/2023  Authorization Period Expiration: 8/16/2024  Plan of Care Expiration: 11/17/2023  Progress Note Due: 11/17/2023   Visit # / Visits authorized: 12/20   FOTO: 2/3: 10/5/2023     Precautions: Standard      Time In: 1335   Time Out: 1415  Total Appointment Time (timed & untimed codes): 40 minutes      Subjective     Patient reports: he tried to go for a jog and stopped after a few minutes due to burning and tightness in calves, but no pain to feet.     He was compliant with home exercise program.  Response to previous treatment: felt good with increased activity  Functional change: no change    Pain: 0/10 at rest; 2/10 on feet.   Location: right feet      Objective      Objective Measures updated at progress report unless specified.   10/16/2023  Ankle ROM  Continues with limited DF B with springy end feel. Pain across anterior ankle in standing soleus stretch        CMS Impairment/Limitation/Restriction for FOTO Foot Survey    Therapist reviewed FOTO scores for Erich Chery on 10/31/2023   FOTO documents entered into [employer] - see Media section.    Evaluation: 67%    Current : 69%    Goal: 74%           Treatment     Erich received the treatments listed below:      therapeutic exercises to develop strength, ROM, and flexibility for 2 minutes including:  DF on stairs 5 x 10"  DF mob kneeling with red super band 10" x 10  GS on slantboard 2 x 30"  Soleus on slantboard 2 x 30"  + s/l " "ankle eversion + inversion x 20 each - 5# ankle weight - leg up on bolster   incline board heel raises x 15 knees straight, x 15 knees flexed  PF stretch with towel x 30"  Toe yoga 5x      manual therapy techniques: Soft tissue Mobilization for 0 minutes, including:    IASTM with MFR tools to B lower legs, followed by manual gastroc/achilles release, talar distraction into DF    0 min x vacuum/cupping STM with manual therapy techniques was performed to R gastroc/soleus, plantar fascia to decrease muscle tightness, increase circulation and promote healing process. The pt's skin was monitored for redness adjusting pressure as needed. The pt was instructed in possible side effects of bruising and/or soreness. Pt verbalized understanding.  To B ankles: talar distraction, posterior talar glides, manual DF ROM with distraction    0 min x Application of TDN: Pt educated on benefits and potential side effects of dry needling. Educated pt on benefits, precautions, side effects following TDN. Educated pt to use heat following treatment sessions if pt is experiencing pain or soreness. Pt verbalized good understanding of education.  Pt signed written consent to dry needling. Pt gave verbal consent for DN    Pt received dry needling to the below listed muscles using 15, 40, and 50 mm needles.  To R foot  Plantar fascia at MCT  Quadratus plantae  Flexor hallucis  Abductor digiti minimi  Medial gastroc x 2  Winding performed every 5 minutes during treatment.  E-stim applied through 2 channels at 2 Hz and 4 mA to tolerance.            therapeutic activities to improve functional performance for 38 minutes, including:     double leg HR on black foam 30x - added weighted green ball   single leg HR on black foam 2 x 10 B  +Standing heel taps, leaning on wall 30x  Dynamic Warm up 2 x 50ft   Hesitation march - 20# KB   Ankle flip - 20# KB  Sidestepping with BTB at forefoot 2 x 50 ft  Monster Walk BTB at forefoot 2 x 50 ft   Drinking " "bird   Walking lunge  lateral cone shuffles 25 ft x 8  +Ghiardelli 2 x 1min  +Agility ladder x 3 laps each   High knees 2 feet/square   High knees 1 foot/square   Icky shuffle   Lateral 2 in/2 out   Lateral switch jump     Clamshells RTB - SL on bosu, other foot up on wall 2 x 15 both sides       walking calf raises 25 ft x 2 - started to get pain on second lap   toe scrunches on archizer 20 x 3"  ankle 4 way GTB x 20 each direction   calf raise ball squeeze x 30      Neuromuscular reeducation  for balance, proprioception for 00 minutes, inculding:  SL ball toss on black foam (R LE only) x 20 forward - heavy blue ball  + SL ball toss on black foam ( RLE only) x 20 lateral both sides - heavy blue ball  + trampoline lateral hop w/ sustained hold on R LE 2 x 20 x 3" hold  Bosu squat 20x  Rockerboard lateral static SLS x 1 min B  Rockerboard SLS static x 1 min, dynamic x20  SLS on wobble board 3 x 30" each      Patient Education and Home Exercises       Education provided:   - Patient educated on therapy rationale with benefit of exercises.  - Patient educated on doing a trial of soccer drills / jogging to eventually progress towards return to running and playing soccer.       Written Home Exercises Provided: Patient instructed to cont prior HEP. Exercises were reviewed and Erich was able to demonstrate them prior to the end of the session.  Erich demonstrated good  understanding of the education provided. See Electronic Medical Record under Patient Instructions for exercises provided during therapy sessions    Assessment     Good tolerance to progression of agility activities today. Fatigue noted with agility ladder and ghiardelli towards end of set, but able to complete all activities. No c/o pain, soreness appropriate to exertion level.         Erich Is progressing well towards his goals.   Patient prognosis is Good.     Patient will continue to benefit from skilled outpatient physical therapy to address the deficits " listed in the problem list box on initial evaluation, provide pt/family education and to maximize pt's level of independence in the home and community environment.     Patient's spiritual, cultural and educational needs considered and pt agreeable to plan of care and goals.     Anticipated barriers to physical therapy: standard    Goals: updated  Short Term Goals (4 Weeks):   1. Patient to have improved B dorsiflexion PROM by 25% or greater for ease with ADL's such as stair descent. (Progressing not met)  2. Patient to have improved single limb balance as noted by 10 sec or greater ea LE for improved gait stability (Progressing not met)  3. Patient to report decreased pain in R 1st MT and arch by 40% or greater for independence with recreation and work tasks. (Progressing not met)  4. Patient will report consistent performance of stretching and HEP to maintain progress made with therapy. (Progressing not met)     Long Term Goals (12 Weeks):   1. Patient to have decreased subjective report of disability as noted by a score of 74% or greater on the FOTO ankle/foot questionnaire (Progressing not met)  2. Patient to be independent with home exercise program for improved self management of condition (Progressing not met)  3. Patient will have increased R ankle strength to 4+/5 or greater for independence with ADL's such as squatting (Progressing not met)  4. 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 (Progressing not met)    Plan     Plan of care Certification: 8/24/2023 to 11/17/2023.     Continue with current plan of care focusing on strengthening foot intrinsics, ankle stability, and improving ankle ROM (continue with DF mobilization exercises). Continue with IASTM as needed.  Brittaney Esqueda, PT

## 2024-01-14 DIAGNOSIS — J45.20 MILD INTERMITTENT ASTHMA WITHOUT COMPLICATION: ICD-10-CM

## 2024-01-14 NOTE — TELEPHONE ENCOUNTER
No care due was identified.  Health [facility] Embedded Care Due Messages. Reference number: [id number].   1/14/2024 6:03:01 AM CST

## 2024-01-16 RX ORDER — FLUTICASONE PROPIONATE AND SALMETEROL 100; 50 UG/1; UG/1
POWDER RESPIRATORY (INHALATION)
Qty: 60 EACH | Refills: 5 | Status: SHIPPED | OUTPATIENT
Start: 2024-01-16

## 2024-01-16 NOTE — TELEPHONE ENCOUNTER
Refill Routing Note   Medication(s) are not appropriate for processing by [facility] for the following reason(s):        Due for refill >6 months ago    [facility] action(s):  Defer               Appointments  past 12m or future 3m with PCP    Date Provider   Last Visit   8/21/2023 Alexis Redman MD   Next Visit   Visit date not found Alexis Redman MD   ED visits in past 90 days: 0        Note composed:3:51 AM 01/16/2024

## 2024-01-30 ENCOUNTER — OFFICE VISIT (OUTPATIENT)
Dept: PULMONOLOGY | Facility: CLINIC | Age: 54
End: 2024-01-30
Payer: COMMERCIAL

## 2024-01-30 VITALS
DIASTOLIC BLOOD PRESSURE: 80 MMHG | SYSTOLIC BLOOD PRESSURE: 117 MMHG | WEIGHT: 242.75 LBS | HEART RATE: 61 BPM | BODY MASS INDEX: 32.17 KG/M2 | HEIGHT: 73 IN | OXYGEN SATURATION: 96 %

## 2024-01-30 DIAGNOSIS — G47.33 OSA (OBSTRUCTIVE SLEEP APNEA): Primary | ICD-10-CM

## 2024-01-30 PROCEDURE — 1159F MED LIST DOCD IN RCRD: CPT | Mod: CPTII,S$GLB,, | Performed by: INTERNAL MEDICINE

## 2024-01-30 PROCEDURE — 99214 OFFICE O/P EST MOD 30 MIN: CPT | Mod: S$GLB,,, | Performed by: INTERNAL MEDICINE

## 2024-01-30 PROCEDURE — 3074F SYST BP LT 130 MM HG: CPT | Mod: CPTII,S$GLB,, | Performed by: INTERNAL MEDICINE

## 2024-01-30 PROCEDURE — 3008F BODY MASS INDEX DOCD: CPT | Mod: CPTII,S$GLB,, | Performed by: INTERNAL MEDICINE

## 2024-01-30 PROCEDURE — 99999 PR PBB SHADOW E&M-EST. PATIENT-LVL III: CPT | Mod: PBBFAC,,, | Performed by: INTERNAL MEDICINE

## 2024-01-30 PROCEDURE — 3079F DIAST BP 80-89 MM HG: CPT | Mod: CPTII,S$GLB,, | Performed by: INTERNAL MEDICINE

## 2024-01-30 NOTE — PROGRESS NOTES
Erich Chery  was seen as a follow up.    CHIEF COMPLAINT:    Chief Complaint   Patient presents with    Apnea       HISTORY OF PRESENT ILLNESS: Erich Chery is a 53 y.o. male is here for sleep evaluation.  Our first encounter was 9/27/19.  At that time, patient has difficulty with sleep maintenance since 2017.  No issue with sleep initiation.  However, often wake up after 2-3 hours with difficulty going back to sleep.  Symptoms worsen since 8/19.  +h/o deviated septum.  Loud snoring.  No witnessed apnea.  Fatigue upon awake.  No parasomnia.  No cataplexy.  No rls symptoms.  Patient is not interested in sleep medication.  +dry mouth upon awake.      Patient works as a .  4 nights per week from 5 pm to 12 am.      Bayard Sleepiness Scale score during initial sleep evaluation was 3.    S/p hsat 10/7/19.  Patient was lost to follow up.  Per patient, he was not interested in therapy at that time.  During our last encounter, patient decided to get oral appliance from dentist, Carolee Cheng.  With mouth guard, snoring improved.    SLEEP ROUTINE:  Activity the hour prior to sleep: snack and watch tv show on mac    Bed partner:  alone  Time to bed:  midnight - 2 am   Lights off:  off  Sleep onset latency:  15-30 minutes        Disruptions or awakenings:    1-2 time per night (can take 20-60 minutes to go back to sleep)  Wakeup time:      7 am - 11 am (depending on whether or not his children is not with him)  Perceived sleep quality: better but still tire       Daytime naps:      none  Weekend sleep routine:      2-3 am to 11 am  Caffeine use: 2 mug of coffee in am   exercise habit:  lifting weights and walking    PAST MEDICAL HISTORY:    Active Ambulatory Problems     Diagnosis Date Noted    Chronic pain of left knee 09/15/2016    Hyperlipidemia 05/04/2017    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/2018    Class 1 obesity due to excess calories with serious comorbidity and body mass index (BMI) of 31.0 to 31.9 in adult 09/13/2019    ARTUR  (obstructive sleep apnea) 2019    Insomnia due to medical condition 2019    Vitamin D deficiency 2021    Primary insomnia 2023    Right foot pain 2023    Ankle joint stiffness, bilateral 2023     Resolved Ambulatory Problems     Diagnosis Date Noted    ACL tear 2016    Preventative health care 2017    Aftercare for anterior cruciate ligament (ACL) repair 2017    Tobacco use 2018    Pain in left ankle 10/20/2022    Difficulty walking 10/20/2022     Past Medical History:   Diagnosis Date    Asthma     Deviated septum     Genital herpes in men     Obesity                 PAST SURGICAL HISTORY:    Past Surgical History:   Procedure Laterality Date    COLONOSCOPY N/A 2021    Procedure: COLONOSCOPY;  Surgeon: Leo Delatorre MD;  Location: [facility] ([address] [place] [address]);  Service: Endoscopy;  Laterality: N/A;  1st colonoscopy - screening  pt fully vaccinated-BB    ESOPHAGOGASTRODUODENOSCOPY N/A 2021    Procedure: EGD (ESOPHAGOGASTRODUODENOSCOPY);  Surgeon: Leo Delatorre MD;  Location: [facility] ([address] [place] [address]);  Service: Endoscopy;  Laterality: N/A;  dysphagia to solid foods  pt fully vaccinated-BB    KNEE SURGERY           FAMILY HISTORY:                Family History   Problem Relation Age of Onset    Hyperlipidemia Father     Heart disease Father         CABG    Cancer Paternal Grandfather         lung       SOCIAL HISTORY:          Tobacco:   Social History     Tobacco Use   Smoking Status Light Smoker    Current packs/day: 0.00    Average packs/day: 0.2 packs/day for 20.0 years (3.0 ttl pk-yrs)    Types: Cigars, Cigarettes    Start date: 1999    Last attempt to quit: 2019    Years since quittin.5   Smokeless Tobacco Former   Tobacco Comments    states he smokes approximately 10 cigarettes per week       alcohol use:    Social History     Substance and Sexual Activity   Alcohol Use Yes    Alcohol/week: 14.0 standard drinks of alcohol    Types: 14  "Shots of liquor per week                 Occupation:  and owner    ALLERGIES:  Review of patient's allergies indicates:  No Known Allergies    CURRENT MEDICATIONS:    Current Outpatient Medications   Medication Sig Dispense Refill    albuterol (PROVENTIL/VENTOLIN HFA) 90 mcg/actuation inhaler INHALE 2 PUFFS BY MOUTH EVERY 6 HOURS AS NEEDED FOR WHEEZING 8.5 g 11    atorvastatin (LIPITOR) 40 MG tablet Take 1 tablet (40 mg total) by mouth once daily. 90 tablet 3    meloxicam (MOBIC) 15 MG tablet Take 1 tablet (15 mg total) by mouth once daily. 30 tablet 0    sildenafiL (VIAGRA) 100 MG tablet Take 1 tablet (100 mg total) by mouth as needed for Erectile Dysfunction. 30 tablet 3    WIXELA INHUB 100-50 mcg/dose diskus inhaler INHALE 1 PUFF INTO THE LUNGS TWICE DAILY. CONTROLLER 60 each 5     No current facility-administered medications for this visit.                  REVIEW OF SYSTEMS:     Sleep related symptoms as per HPI.  CONST:Denies weight gain    HEENT: deviated septum  PULM: Denies dyspnea; no coughing or wheezing  CARD:  Denies palpitations   GI:  +occasional acid reflux  : Denies polyuria  NEURO: Denies headaches  PSYCH: Denies mood disturbance  HEME: Denies anemia   Otherwise, a balance of systems reviewed is negative.          PHYSICAL EXAM:  Vitals:    01/30/24 1425   BP: 117/80   Pulse: 61   SpO2: 96%   Weight: 110.1 kg (242 lb 11.6 oz)   Height: 6' 1" (1.854 m)   PainSc:   4   PainLoc: Back     Body mass index is 32.02 kg/m².     GENERAL: Normal development, well groomed  HEENT:  Conjunctivae are non-erythematous; Pupils equal, round, and reactive to light; Nose is symmetrical; Nasal mucosa is pink and moist; Septum is midline; Inferior turbinates are normal; Nasal airflow is normal; Posterior pharynx is pink; Modified Mallampati: 3; Posterior palate is normal; Tonsils +1; Uvula is normal and pink;Tongue is normal; Dentition is fair; No TMJ tenderness; Jaw opening and protrusion without click and " without discomfort.  +scalloping markings on side of tongue.  NECK: Supple. Neck circumference is 17 inches. No thyromegaly. No palpable nodes.     SKIN: On face and neck: No abrasions, no rashes, no lesions.  No subcutaneous nodules are palpable.  RESPIRATORY: Chest is clear to auscultation.  Normal chest expansion and non-labored breathing at rest.  CARDIOVASCULAR: Normal S1, S2.  No murmurs, gallops or rubs. No carotid bruits bilaterally.  EXTREMITIES: No edema. No clubbing. No cyanosis. Station normal. Gait normal.        NEURO/PSYCH: Oriented to time, place and person. Normal attention span and concentration. Affect is full. Mood is normal.                                              DATA   9/19/19 ratio 77%; fvc 102%; fev1 99%; dlco 113%  hsat 10/7/19 ahi of 19; rdi of 48    Lab Results   Component Value Date    TSH 2.196 01/17/2023     ASSESSMENT/PLAN  Problem List Items Addressed This Visit       ARTUR (obstructive sleep apnea) - Primary    Overview     ahi of 19 and rdi of 48  Currently with oral appliance.  Still with daytime fatigue.  Agreeable to apap.            Relevant Orders    CPAP FOR HOME USE         Deviated septum - s/p ENT surgery.  .        Education: During our discussion today, we talked about the etiology of obstructive sleep apnea as well as the potential ramifications of untreated sleep apnea, which could include daytime sleepiness, hypertension, heart disease and/or stroke.     Precautions: The patient was advised to abstain from driving should they feel sleepy or drowsy.       Patient will No follow-ups on file. with md/np.      30 minutes of total time spent on the encounter, which includes face to face time and non-face to face time preparing to see the patient (eg, review of tests), Obtaining and/or reviewing separately obtained history, documenting clinical information in the electronic or other health record, independently interpreting results (not separately reported) and  communicating results to the patient/family/caregiver, or Care coordination (not separately reported).

## 2024-01-30 NOTE — PATIENT INSTRUCTIONS
Call [facility] at [phone] if you don't get a call in 7-10 working days.

## 2024-02-20 ENCOUNTER — TELEPHONE (OUTPATIENT)
Dept: PULMONOLOGY | Facility: HOSPITAL | Age: 54
End: 2024-02-20
Payer: COMMERCIAL

## 2024-02-20 DIAGNOSIS — G47.33 OSA (OBSTRUCTIVE SLEEP APNEA): Primary | ICD-10-CM

## 2024-02-20 NOTE — TELEPHONE ENCOUNTER
----- Message from Nathan Myles MA sent at 2024  7:44 AM CST -----  Regarding: FW: AutoPap  Contact: [phone]    ----- Message -----  From: Maris Perez  Sent: 2024  12:53 PM CST  To: Arielle Dukes V Staff  Subject: AutoPap                                          Good afternoon! Sleep study from 2019 has  so patient will need to have a new one done. Thanks! Jose

## 2024-02-22 ENCOUNTER — HOSPITAL ENCOUNTER (OUTPATIENT)
Dept: SLEEP MEDICINE | Facility: OTHER | Age: 54
Discharge: HOME | End: 2024-02-22
Attending: INTERNAL MEDICINE
Payer: COMMERCIAL

## 2024-02-22 DIAGNOSIS — G47.33 OSA (OBSTRUCTIVE SLEEP APNEA): ICD-10-CM

## 2024-02-22 PROCEDURE — 95806 SLEEP STUDY UNATT&RESP EFFT: CPT | Mod: 26,,, | Performed by: INTERNAL MEDICINE

## 2024-02-22 PROCEDURE — 95800 SLP STDY UNATTENDED: CPT

## 2024-02-28 ENCOUNTER — PATIENT MESSAGE (OUTPATIENT)
Dept: PULMONOLOGY | Facility: CLINIC | Age: 54
End: 2024-02-28
Payer: COMMERCIAL

## 2024-02-28 DIAGNOSIS — G47.33 OSA (OBSTRUCTIVE SLEEP APNEA): Primary | ICD-10-CM

## 2024-02-29 NOTE — PROCEDURES
"Dear Provider,     You have ordered sleep LAB services to perform the sleep study for Erich Chery.  The sleep study that you ordered is complete.      Please find Sleep Study result in "Chart Review" under the "Media tab."      As the ordering provider, you are responsible for reviewing the results and implementing a treatment plan with your patient.    If you need a Sleep Medicine provider to explain the sleep study findings and arrange treatment for the patient, please refer patient for consultation to our Sleep Clinic via [facility] with Ambulatory Consult Sleep.    To do that please place an order for an  "Ambulatory Consult Sleep" - it will go to our clinic work queue for our Medical Assistant to contact the patient for an appointment.     For any questions, please contact our clinic staff at [phone] to talk to clinical staff.    "

## 2024-04-08 ENCOUNTER — PATIENT MESSAGE (OUTPATIENT)
Dept: PRIMARY CARE CLINIC | Facility: CLINIC | Age: 54
End: 2024-04-08
Payer: COMMERCIAL

## 2024-04-08 DIAGNOSIS — L64.9 ANDROGENIC ALOPECIA: Primary | ICD-10-CM

## 2024-04-09 RX ORDER — FINASTERIDE 1 MG/1
1 TABLET, FILM COATED ORAL DAILY
Qty: 30 TABLET | Refills: 3 | Status: SHIPPED | OUTPATIENT
Start: 2024-04-09

## 2024-04-24 ENCOUNTER — TELEPHONE (OUTPATIENT)
Dept: PULMONOLOGY | Facility: HOSPITAL | Age: 54
End: 2024-04-24
Payer: COMMERCIAL

## 2024-04-24 ENCOUNTER — PATIENT MESSAGE (OUTPATIENT)
Dept: PULMONOLOGY | Facility: CLINIC | Age: 54
End: 2024-04-24
Payer: COMMERCIAL

## 2024-04-24 DIAGNOSIS — G47.33 OSA (OBSTRUCTIVE SLEEP APNEA): Primary | ICD-10-CM

## 2024-04-25 NOTE — TELEPHONE ENCOUNTER
----- Message from Nathan Myles MA sent at 4/24/2024 10:18 AM CDT -----    ----- Message -----  From: Arun Boudreaux  Sent: 4/24/2024  10:17 AM CDT  To: Arielle Dukes V Staff    Good morning,   The pt Is in office today for a New mask says he had the worse sleep of this life can we get a RX for FFM ASAP so that we can send the pt home w/ one.     Thanks

## 2024-05-20 ENCOUNTER — TELEPHONE (OUTPATIENT)
Dept: PRIMARY CARE CLINIC | Facility: CLINIC | Age: 54
End: 2024-05-20
Payer: COMMERCIAL

## 2024-05-20 DIAGNOSIS — Z11.4 SCREENING FOR HIV (HUMAN IMMUNODEFICIENCY VIRUS): ICD-10-CM

## 2024-05-20 DIAGNOSIS — Z11.3 SCREEN FOR STD (SEXUALLY TRANSMITTED DISEASE): ICD-10-CM

## 2024-05-20 DIAGNOSIS — Z00.00 ANNUAL PHYSICAL EXAM: Primary | ICD-10-CM

## 2024-05-20 DIAGNOSIS — Z11.59 SCREENING FOR VIRAL DISEASE: ICD-10-CM

## 2024-05-21 ENCOUNTER — LAB VISIT (OUTPATIENT)
Dept: LAB | Facility: OTHER | Age: 54
End: 2024-05-21
Attending: INTERNAL MEDICINE
Payer: COMMERCIAL

## 2024-05-21 DIAGNOSIS — Z00.00 ANNUAL PHYSICAL EXAM: ICD-10-CM

## 2024-05-21 DIAGNOSIS — Z11.59 SCREENING FOR VIRAL DISEASE: ICD-10-CM

## 2024-05-21 DIAGNOSIS — Z11.3 SCREEN FOR STD (SEXUALLY TRANSMITTED DISEASE): ICD-10-CM

## 2024-05-21 DIAGNOSIS — Z11.4 SCREENING FOR HIV (HUMAN IMMUNODEFICIENCY VIRUS): ICD-10-CM

## 2024-05-21 LAB
ALBUMIN SERPL BCP-MCNC: 4.3 G/DL (ref 3.5–5.2)
ALP SERPL-CCNC: 61 U/L (ref 55–135)
ALT SERPL W/O P-5'-P-CCNC: 37 U/L (ref 10–44)
ANION GAP SERPL CALC-SCNC: 10 MMOL/L (ref 8–16)
AST SERPL-CCNC: 30 U/L (ref 10–40)
BASOPHILS # BLD AUTO: 0.08 K/UL (ref 0–0.2)
BASOPHILS NFR BLD: 1.3 % (ref 0–1.9)
BILIRUB SERPL-MCNC: 0.6 MG/DL (ref 0.1–1)
BUN SERPL-MCNC: 15 MG/DL (ref 6–20)
CALCIUM SERPL-MCNC: 9.6 MG/DL (ref 8.7–10.5)
CHLORIDE SERPL-SCNC: 108 MMOL/L (ref 95–110)
CHOLEST SERPL-MCNC: 199 MG/DL (ref 120–199)
CHOLEST/HDLC SERPL: 5.4 {RATIO} (ref 2–5)
CO2 SERPL-SCNC: 25 MMOL/L (ref 23–29)
COMPLEXED PSA SERPL-MCNC: 1.9 NG/ML (ref 0–4)
CREAT SERPL-MCNC: 1.3 MG/DL (ref 0.5–1.4)
DIFFERENTIAL METHOD BLD: ABNORMAL
EOSINOPHIL # BLD AUTO: 0.6 K/UL (ref 0–0.5)
EOSINOPHIL NFR BLD: 9.4 % (ref 0–8)
ERYTHROCYTE [DISTWIDTH] IN BLOOD BY AUTOMATED COUNT: 12.1 % (ref 11.5–14.5)
EST. GFR  (NO RACE VARIABLE): >60 ML/MIN/1.73 M^2
GLUCOSE SERPL-MCNC: 99 MG/DL (ref 70–110)
HAV IGM SERPL QL IA: NORMAL
HBV CORE IGM SERPL QL IA: NORMAL
HBV SURFACE AG SERPL QL IA: NORMAL
HCT VFR BLD AUTO: 46.5 % (ref 40–54)
HCV AB SERPL QL IA: NEGATIVE
HDLC SERPL-MCNC: 37 MG/DL (ref 40–75)
HDLC SERPL: 18.6 % (ref 20–50)
HGB BLD-MCNC: 15.8 G/DL (ref 14–18)
HIV 1+2 AB+HIV1 P24 AG SERPL QL IA: NEGATIVE
IMM GRANULOCYTES # BLD AUTO: 0.04 K/UL (ref 0–0.04)
IMM GRANULOCYTES NFR BLD AUTO: 0.6 % (ref 0–0.5)
LDLC SERPL CALC-MCNC: 131.2 MG/DL (ref 63–159)
LYMPHOCYTES # BLD AUTO: 1.6 K/UL (ref 1–4.8)
LYMPHOCYTES NFR BLD: 26.6 % (ref 18–48)
MCH RBC QN AUTO: 31 PG (ref 27–31)
MCHC RBC AUTO-ENTMCNC: 34 G/DL (ref 32–36)
MCV RBC AUTO: 91 FL (ref 82–98)
MONOCYTES # BLD AUTO: 0.7 K/UL (ref 0.3–1)
MONOCYTES NFR BLD: 11.4 % (ref 4–15)
NEUTROPHILS # BLD AUTO: 3.1 K/UL (ref 1.8–7.7)
NEUTROPHILS NFR BLD: 50.7 % (ref 38–73)
NONHDLC SERPL-MCNC: 162 MG/DL
NRBC BLD-RTO: 0 /100 WBC
PLATELET # BLD AUTO: 288 K/UL (ref 150–450)
PMV BLD AUTO: 9.4 FL (ref 9.2–12.9)
POTASSIUM SERPL-SCNC: 5.1 MMOL/L (ref 3.5–5.1)
PROT SERPL-MCNC: 7.4 G/DL (ref 6–8.4)
RBC # BLD AUTO: 5.1 M/UL (ref 4.6–6.2)
SODIUM SERPL-SCNC: 143 MMOL/L (ref 136–145)
TREPONEMA PALLIDUM IGG+IGM AB [PRESENCE] IN SERUM OR PLASMA BY IMMUNOASSAY: NONREACTIVE
TRIGL SERPL-MCNC: 154 MG/DL (ref 30–150)
TSH SERPL DL<=0.005 MIU/L-ACNC: 1.01 UIU/ML (ref 0.4–4)
WBC # BLD AUTO: 6.16 K/UL (ref 3.9–12.7)

## 2024-05-21 PROCEDURE — 86593 SYPHILIS TEST NON-TREP QUANT: CPT | Performed by: INTERNAL MEDICINE

## 2024-05-21 PROCEDURE — 87389 HIV-1 AG W/HIV-1&-2 AB AG IA: CPT | Performed by: INTERNAL MEDICINE

## 2024-05-21 PROCEDURE — 84153 ASSAY OF PSA TOTAL: CPT | Performed by: INTERNAL MEDICINE

## 2024-05-21 PROCEDURE — 80074 ACUTE HEPATITIS PANEL: CPT | Performed by: INTERNAL MEDICINE

## 2024-05-21 PROCEDURE — 80061 LIPID PANEL: CPT | Performed by: INTERNAL MEDICINE

## 2024-05-21 PROCEDURE — 80053 COMPREHEN METABOLIC PANEL: CPT | Performed by: INTERNAL MEDICINE

## 2024-05-21 PROCEDURE — 87591 N.GONORRHOEAE DNA AMP PROB: CPT | Performed by: INTERNAL MEDICINE

## 2024-05-21 PROCEDURE — 85025 COMPLETE CBC W/AUTO DIFF WBC: CPT | Performed by: INTERNAL MEDICINE

## 2024-05-21 PROCEDURE — 36415 COLL VENOUS BLD VENIPUNCTURE: CPT | Performed by: INTERNAL MEDICINE

## 2024-05-21 PROCEDURE — 84443 ASSAY THYROID STIM HORMONE: CPT | Performed by: INTERNAL MEDICINE

## 2024-05-22 LAB
C TRACH DNA SPEC QL NAA+PROBE: NOT DETECTED
N GONORRHOEA DNA SPEC QL NAA+PROBE: NOT DETECTED

## 2024-05-31 ENCOUNTER — OFFICE VISIT (OUTPATIENT)
Dept: PULMONOLOGY | Facility: CLINIC | Age: 54
End: 2024-05-31
Payer: COMMERCIAL

## 2024-05-31 VITALS
BODY MASS INDEX: 32.54 KG/M2 | HEART RATE: 64 BPM | SYSTOLIC BLOOD PRESSURE: 107 MMHG | HEIGHT: 73 IN | WEIGHT: 245.5 LBS | DIASTOLIC BLOOD PRESSURE: 72 MMHG | OXYGEN SATURATION: 96 %

## 2024-05-31 DIAGNOSIS — J34.2 DEVIATED SEPTUM: ICD-10-CM

## 2024-05-31 DIAGNOSIS — R09.81 NASAL CONGESTION: Primary | ICD-10-CM

## 2024-05-31 DIAGNOSIS — G47.33 OSA (OBSTRUCTIVE SLEEP APNEA): ICD-10-CM

## 2024-05-31 PROCEDURE — 99214 OFFICE O/P EST MOD 30 MIN: CPT | Mod: S$GLB,,, | Performed by: INTERNAL MEDICINE

## 2024-05-31 PROCEDURE — 3074F SYST BP LT 130 MM HG: CPT | Mod: CPTII,S$GLB,, | Performed by: INTERNAL MEDICINE

## 2024-05-31 PROCEDURE — 3008F BODY MASS INDEX DOCD: CPT | Mod: CPTII,S$GLB,, | Performed by: INTERNAL MEDICINE

## 2024-05-31 PROCEDURE — 3078F DIAST BP <80 MM HG: CPT | Mod: CPTII,S$GLB,, | Performed by: INTERNAL MEDICINE

## 2024-05-31 PROCEDURE — 99999 PR PBB SHADOW E&M-EST. PATIENT-LVL III: CPT | Mod: PBBFAC,,, | Performed by: INTERNAL MEDICINE

## 2024-05-31 PROCEDURE — 1159F MED LIST DOCD IN RCRD: CPT | Mod: CPTII,S$GLB,, | Performed by: INTERNAL MEDICINE

## 2024-05-31 RX ORDER — AZELASTINE 1 MG/ML
1 SPRAY, METERED NASAL 2 TIMES DAILY
Qty: 30 ML | Refills: 3 | Status: SHIPPED | OUTPATIENT
Start: 2024-05-31

## 2024-05-31 NOTE — PROGRESS NOTES
Erich Chery  was seen as a follow up.    CHIEF COMPLAINT:    Chief Complaint   Patient presents with    Apnea       HISTORY OF PRESENT ILLNESS: Erich Chery is a 53 y.o. male is here for sleep evaluation.  Our first encounter was 9/27/19.  At that time, patient has difficulty with sleep maintenance since 2017.  No issue with sleep initiation.  However, often wake up after 2-3 hours with difficulty going back to sleep.  Symptoms worsen since 8/19.  +h/o deviated septum.  Loud snoring.  No witnessed apnea.  Fatigue upon awake.  No parasomnia.  No cataplexy.  No rls symptoms.  Patient is not interested in sleep medication.  +dry mouth upon awake.      Patient works as a .  4 nights per week from 5 pm to 12 am.      Holcomb Sleepiness Scale score during initial sleep evaluation was 3.    S/p hsat 10/7/19 with ahi of 19.  Patient was lost to follow up.  Per patient, he was not interested in therapy at that time.  During our last encounter, patient decided to get oral appliance from dentist, Carolee Cheng.  With mouth guard, snoring improved.  However, patient with persistent fatigue.  Repeat hsat 2/22/24 with ahi of 14.  Patient was set up apap.  Using apap most night.  Still feeling tire upon awake.  Sleep is slightly better.      SLEEP ROUTINE:  Activity the hour prior to sleep: snack and watch tv show on mac    Bed partner:  alone  Time to bed:  midnight - 2 am   Lights off:  off  Sleep onset latency:  15-30 minutes        Disruptions or awakenings:    1-2 time per night (can take 15 minutes to go back to sleep)  Wakeup time:      7 am - 11 am (depending on whether or not his children is not with him)  Perceived sleep quality: better but still tire       Daytime naps:      none  Weekend sleep routine:      2-3 am to 11 am  Caffeine use: 2 mug of coffee in am   exercise habit:  lifting weights and walking    PAST MEDICAL HISTORY:    Active Ambulatory Problems     Diagnosis Date Noted    Chronic pain of left knee  09/15/2016    Hyperlipidemia 2017    Genital HSV 2018    Class 1 obesity due to excess calories with serious comorbidity and body mass index (BMI) of 31.0 to 31.9 in adult 2019    ARTUR (obstructive sleep apnea) 2019    Insomnia due to medical condition 2019    Vitamin D deficiency 2021    Primary insomnia 2023    Right foot pain 2023    Ankle joint stiffness, bilateral 2023    Deviated septum 2024     Resolved Ambulatory Problems     Diagnosis Date Noted    ACL tear 2016    Preventative health care 2017    Aftercare for anterior cruciate ligament (ACL) repair 2017    Tobacco use 2018    Pain in left ankle 10/20/2022    Difficulty walking 10/20/2022     Past Medical History:   Diagnosis Date    Asthma     Genital herpes in men     Obesity                 PAST SURGICAL HISTORY:    Past Surgical History:   Procedure Laterality Date    COLONOSCOPY N/A 2021    Procedure: COLONOSCOPY;  Surgeon: Leo Delatorre MD;  Location: [facility] ([address] [place] [address]);  Service: Endoscopy;  Laterality: N/A;  1st colonoscopy - screening  pt fully vaccinated-BB    ESOPHAGOGASTRODUODENOSCOPY N/A 2021    Procedure: EGD (ESOPHAGOGASTRODUODENOSCOPY);  Surgeon: Leo Delatorre MD;  Location: [facility] ([address] [place] [address]);  Service: Endoscopy;  Laterality: N/A;  dysphagia to solid foods  pt fully vaccinated-BB    KNEE SURGERY           FAMILY HISTORY:                Family History   Problem Relation Name Age of Onset    Hyperlipidemia Father      Heart disease Father          CABG    Cancer Paternal Grandfather          lung       SOCIAL HISTORY:          Tobacco:   Social History     Tobacco Use   Smoking Status Light Smoker    Current packs/day: 0.00    Average packs/day: 0.2 packs/day for 20.0 years (3.0 ttl pk-yrs)    Types: Cigars, Cigarettes    Start date: 1999    Last attempt to quit: 2019    Years since quittin.9   Smokeless Tobacco Former  "  Tobacco Comments    states he smokes approximately 10 cigarettes per week       alcohol use:    Social History     Substance and Sexual Activity   Alcohol Use Yes    Alcohol/week: 14.0 standard drinks of alcohol    Types: 14 Shots of liquor per week                 Occupation:  and owner    ALLERGIES:  Review of patient's allergies indicates:  No Known Allergies    CURRENT MEDICATIONS:    Current Outpatient Medications   Medication Sig Dispense Refill    albuterol (PROVENTIL/VENTOLIN HFA) 90 mcg/actuation inhaler INHALE 2 PUFFS BY MOUTH EVERY 6 HOURS AS NEEDED FOR WHEEZING 8.5 g 11    atorvastatin (LIPITOR) 40 MG tablet Take 1 tablet (40 mg total) by mouth once daily. 90 tablet 3    finasteride (PROPECIA) 1 mg tablet Take 1 tablet (1 mg total) by mouth once daily. 30 tablet 3    meloxicam (MOBIC) 15 MG tablet Take 1 tablet (15 mg total) by mouth once daily. 30 tablet 0    sildenafiL (VIAGRA) 100 MG tablet Take 1 tablet (100 mg total) by mouth as needed for Erectile Dysfunction. 30 tablet 3    WIXELA INHUB 100-50 mcg/dose diskus inhaler INHALE 1 PUFF INTO THE LUNGS TWICE DAILY. CONTROLLER 60 each 5    azelastine (ASTELIN) 137 mcg (0.1 %) nasal spray 1 spray (137 mcg total) by Nasal route 2 (two) times daily. 30 mL 3     No current facility-administered medications for this visit.                  REVIEW OF SYSTEMS:     Sleep related symptoms as per HPI.  CONST:Denies weight gain    HEENT: deviated septum  PULM: Denies dyspnea; no coughing or wheezing  CARD:  Denies palpitations   GI:  +occasional acid reflux  : Denies polyuria  NEURO: Denies headaches  PSYCH: Denies mood disturbance  HEME: Denies anemia   Otherwise, a balance of systems reviewed is negative.          PHYSICAL EXAM:  Vitals:    05/31/24 1318   BP: 107/72   Pulse: 64   SpO2: 96%   Weight: 111.4 kg (245 lb 7.7 oz)   Height: 6' 1" (1.854 m)   PainSc: 0-No pain       Body mass index is 32.39 kg/m².     GENERAL: Normal development, well " groomed  HEENT:  Conjunctivae are non-erythematous; Pupils equal, round, and reactive to light; Nose is symmetrical; Nasal mucosa is pink and moist; Septum is midline; Inferior turbinates are normal; Nasal airflow is normal; Posterior pharynx is pink; Modified Mallampati: 3; Posterior palate is normal; Tonsils +1; Uvula is normal and pink;Tongue is normal; Dentition is fair; No TMJ tenderness; Jaw opening and protrusion without click and without discomfort.  +scalloping markings on side of tongue.  NECK: Supple. Neck circumference is 17 inches. No thyromegaly. No palpable nodes.     SKIN: On face and neck: No abrasions, no rashes, no lesions.  No subcutaneous nodules are palpable.  RESPIRATORY: Chest is clear to auscultation.  Normal chest expansion and non-labored breathing at rest.  CARDIOVASCULAR: Normal S1, S2.  No murmurs, gallops or rubs. No carotid bruits bilaterally.  EXTREMITIES: No edema. No clubbing. No cyanosis. Station normal. Gait normal.        NEURO/PSYCH: Oriented to time, place and person. Normal attention span and concentration. Affect is full. Mood is normal.                                              DATA   9/19/19 ratio 77%; fvc 102%; fev1 99%; dlco 113%  hsat 10/7/19 ahi of 19; rdi of 48  Hsat 2/28/24 ahi of 14; rdi of 31    Lab Results   Component Value Date    TSH 1.014 05/21/2024     ASSESSMENT/PLAN  Problem List Items Addressed This Visit       Deviated septum    Overview     s/p ENT surgery.  Sinus irrigation and nasal spray         ARTUR (obstructive sleep apnea)    [facility]     [facility]  Hsat 2/28/24 with oral appliance with ahi of 14; rdi of 31  70%>4 hours.  Residual ahi of 2.1.  patient is benefiting from apap.  Main issue is excessive leakage.            Other Visit Diagnoses       Nasal congestion    -  Primary    Relevant Medications    azelastine (ASTELIN) 137 mcg (0.1 %) nasal spray            Education: During our discussion today, we talked about  the etiology of obstructive sleep apnea as well as the potential ramifications of untreated sleep apnea, which could include daytime sleepiness, hypertension, heart disease and/or stroke.     Precautions: The patient was advised to abstain from driving should they feel sleepy or drowsy.       Patient will No follow-ups on file. with md/np.      30 minutes of total time spent on the encounter, which includes face to face time and non-face to face time preparing to see the patient (eg, review of tests), Obtaining and/or reviewing separately obtained history, documenting clinical information in the electronic or other health record, independently interpreting results (not separately reported) and communicating results to the patient/family/caregiver, or Care coordination (not separately reported).

## 2024-06-03 ENCOUNTER — TELEPHONE (OUTPATIENT)
Dept: PRIMARY CARE CLINIC | Facility: CLINIC | Age: 54
End: 2024-06-03
Payer: COMMERCIAL

## 2024-06-03 DIAGNOSIS — A60.00 GENITAL HERPES SIMPLEX, UNSPECIFIED SITE: Primary | ICD-10-CM

## 2024-06-03 RX ORDER — VALACYCLOVIR HYDROCHLORIDE 1 G/1
1000 TABLET, FILM COATED ORAL DAILY
Qty: 30 TABLET | Refills: 3 | Status: SHIPPED | OUTPATIENT
Start: 2024-06-03 | End: 2025-06-03

## 2024-08-13 ENCOUNTER — OFFICE VISIT (OUTPATIENT)
Dept: PODIATRY | Facility: CLINIC | Age: 54
End: 2024-08-13
Payer: COMMERCIAL

## 2024-08-13 VITALS
WEIGHT: 245.56 LBS | DIASTOLIC BLOOD PRESSURE: 85 MMHG | SYSTOLIC BLOOD PRESSURE: 124 MMHG | BODY MASS INDEX: 32.54 KG/M2 | HEART RATE: 73 BPM | HEIGHT: 73 IN

## 2024-08-13 DIAGNOSIS — L60.0 INGROWN NAIL: Primary | ICD-10-CM

## 2024-08-13 DIAGNOSIS — M79.674 TOE PAIN, RIGHT: ICD-10-CM

## 2024-08-13 DIAGNOSIS — L03.031 PARONYCHIA, TOE, RIGHT: ICD-10-CM

## 2024-08-13 PROCEDURE — 87070 CULTURE OTHR SPECIMN AEROBIC: CPT | Performed by: PODIATRIST

## 2024-08-13 PROCEDURE — 3074F SYST BP LT 130 MM HG: CPT | Mod: CPTII,S$GLB,, | Performed by: PODIATRIST

## 2024-08-13 PROCEDURE — 99214 OFFICE O/P EST MOD 30 MIN: CPT | Mod: S$GLB,,, | Performed by: PODIATRIST

## 2024-08-13 PROCEDURE — 87075 CULTR BACTERIA EXCEPT BLOOD: CPT | Performed by: PODIATRIST

## 2024-08-13 PROCEDURE — 3008F BODY MASS INDEX DOCD: CPT | Mod: CPTII,S$GLB,, | Performed by: PODIATRIST

## 2024-08-13 PROCEDURE — 1159F MED LIST DOCD IN RCRD: CPT | Mod: CPTII,S$GLB,, | Performed by: PODIATRIST

## 2024-08-13 PROCEDURE — 87077 CULTURE AEROBIC IDENTIFY: CPT | Performed by: PODIATRIST

## 2024-08-13 PROCEDURE — 87186 SC STD MICRODIL/AGAR DIL: CPT | Performed by: PODIATRIST

## 2024-08-13 PROCEDURE — 3079F DIAST BP 80-89 MM HG: CPT | Mod: CPTII,S$GLB,, | Performed by: PODIATRIST

## 2024-08-13 PROCEDURE — 99999 PR PBB SHADOW E&M-EST. PATIENT-LVL III: CPT | Mod: PBBFAC,,, | Performed by: PODIATRIST

## 2024-08-13 RX ORDER — TOBRAMYCIN 3 MG/ML
SOLUTION/ DROPS OPHTHALMIC
Qty: 5 ML | Refills: 3 | Status: SHIPPED | OUTPATIENT
Start: 2024-08-13

## 2024-08-13 RX ORDER — SULFAMETHOXAZOLE AND TRIMETHOPRIM 400; 80 MG/1; MG/1
1 TABLET ORAL 2 TIMES DAILY
Qty: 20 TABLET | Refills: 0 | Status: SHIPPED | OUTPATIENT
Start: 2024-08-13

## 2024-08-13 NOTE — PROGRESS NOTES
Subjective:      Patient ID: Erich Chery is a 53 y.o. male.    Chief Complaint: Ingrown Toenail (R great toe)    Sharp, throbbing pain right hallux toe/toenail lateral border.  Gradual onset, worsening over the past 2 weeks or more.  Aggravated by socks shoes pressure ambulation.  This is a chronic condition that is been present on and off for 2 years.  Prior trimming and topical antibiotics worked well in the past.  He just let this go a little farther than usual.  Denies fever chills night sweats nausea vomiting.    Review of Systems   Constitutional: Negative for chills, decreased appetite, diaphoresis, fever, malaise/fatigue and night sweats.   Skin:  Positive for nail changes.         Objective:      Physical Exam  Constitutional:       General: He is not in acute distress.     Appearance: He is well-developed. He is not diaphoretic.   Cardiovascular:      Pulses:           Popliteal pulses are 2+ on the right side and 2+ on the left side.        Dorsalis pedis pulses are 2+ on the right side and 2+ on the left side.        Posterior tibial pulses are 2+ on the right side and 2+ on the left side.      Comments: Capillary refill 3 seconds all toes/distal feet, all toes/both feet warm to touch.      Negative lymphadenopathy bilateral popliteal fossa and tarsal tunnel.      Negavie lower extremity edema bilateral.    Musculoskeletal:      Right ankle: No swelling, deformity, ecchymosis or lacerations. Normal range of motion. Normal pulse.      Right Achilles Tendon: Normal. No defects. Almonte's test negative.   Lymphadenopathy:      Lower Body: No right inguinal adenopathy. No left inguinal adenopathy.      Comments: Negative lymphadenopathy bilateral popliteal fossa and tarsal tunnel.    Negative lymphangitic streaking bilateral feet/ankles/legs.   Skin:     General: Skin is warm and dry.      Capillary Refill: Capillary refill takes 2 to 3 seconds.      Coloration: Skin is not pale.      Findings: No  abrasion, bruising, burn, ecchymosis, erythema, laceration, lesion or rash.      Nails: There is no clubbing.      Comments: Visible and palpable ingrowth of toenail lateral border right hallux with pain to palpation, and focal localized erythema and edema,  without ulceration, drainage, pus, tracking, fluctuance, malodor, or cardinal signs infection.       Neurological:      Mental Status: He is alert and oriented to person, place, and time.      Sensory: No sensory deficit.      Motor: No tremor, atrophy or abnormal muscle tone.      Gait: Gait normal.      Deep Tendon Reflexes:      Reflex Scores:       Patellar reflexes are 2+ on the right side and 2+ on the left side.       Achilles reflexes are 2+ on the right side and 2+ on the left side.  Psychiatric:         Behavior: Behavior is cooperative.           Assessment:       Encounter Diagnoses   Name Primary?    Ingrown nail Yes    Toe pain, right     Paronychia, toe, right          Plan:       Erich was seen today for ingrown toenail.    Diagnoses and all orders for this visit:    Ingrown nail  -     Aerobic culture  -     Culture, Anaerobic    Toe pain, right  -     Aerobic culture  -     Culture, Anaerobic    Paronychia, toe, right  -     Aerobic culture  -     Culture, Anaerobic    Other orders  -     tobramycin sulfate 0.3% (TOBREX) 0.3 % ophthalmic solution; 1-2 drops topically twice daily to affected toe(s).  -     sulfamethoxazole-trimethoprim 400-80mg (BACTRIM,SEPTRA) 400-80 mg per tablet; Take 1 tablet by mouth 2 (two) times daily.      I counseled the patient on his conditions, their implications and medical management.    Topical tobramycin drops twice daily right hallux.      Cover right hallux all times with Band-Aid or similar changing daily.      Cultures right hallux lateral border    Bactrim double-strength twice.      One-week, sooner p.r.n.    Utilizing sterile toenail clippers I aggressively debrided the offending nail border  approximately 3 mm from its edge and carried the nail plate incision down at an angle in order to wedge out the offending cryptotic portion of the nail plate. The offending border was then removed in toto. The area was cleansed with alcohol. Patient tolerated the procedure well and related significant relief.          No follow-ups on file.

## 2024-08-15 LAB
BACTERIA SPEC AEROBE CULT: ABNORMAL
BACTERIA SPEC ANAEROBE CULT: NORMAL

## 2024-09-12 NOTE — PROGRESS NOTES
Erich Chery  was seen as a new patient at the request of  Adriano Shelby MD for the evaluation of  mily.    CHIEF COMPLAINT:    Chief Complaint   Patient presents with    Snoring    Consult    Fatigue       HISTORY OF PRESENT ILLNESS: Erich Chery is a 48 y.o. male is here for sleep evaluation.   Patient has difficulty with sleep maintenance since 2017.  No issue with sleep initiation.  However, often wake up after 2-3 hours with difficulty going back to sleep.  Symptoms worsen since 8/19.  +h/o deviated septum.  Loud snoring.  No witnessed apnea.  Fatigue upon awake.  No parasomnia.  No cataplexy.  No rls symptoms.  Patient is not interested in sleep medication.  +dry mouth upon awake.      Patient works as a .  4 nights per week from 5 pm to 12 am.      [place] Sleepiness Scale score during initial sleep evaluation was 3.    SLEEP ROUTINE:  Activity the hour prior to sleep: snack and watch tv show on mac    Bed partner:  alone  Time to bed:  midnight  Lights off:  off  Sleep onset latency:  15-30 minutes        Disruptions or awakenings:    2 times per night (can take 20-60 minutes to go back to sleep)  Wakeup time:      7 am - 11 am (depending on whether or not his children is not with him)  Perceived sleep quality:  tire       Daytime naps:      none  Weekend sleep routine:      2-3 am to 11 am  Caffeine use: 2 mug of coffee in am   exercise habit:   Limited exercise due to plantar fasciitis      PAST MEDICAL HISTORY:    Active Ambulatory Problems     Diagnosis Date Noted    ACL tear 03/09/2016    Left knee pain 03/11/2016    Left shoulder pain 04/12/2016    Chronic pain of left knee 09/15/2016    Hyperlipidemia 05/04/2017    Metatarsalgia 05/04/2017    Aftercare for anterior cruciate ligament (ACL) repair 09/29/2017    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/2018    Chronic fatigue 09/13/2019    Snoring 09/13/2019    Class 1 obesity due to excess calories with serious comorbidity and body mass index  Plastic Surgery History and Physical Note    Current Date: 9/12/2024  Name: Chante Harrell  Age: 55 y.o.  Sex: female  MRN: [id number]  YOB: 1969    ?  Chief Complaint:  breast cancer  ?  History of Present Illness:  Chante Harrell is a female 55 y.o. here today for for left lumpectomy with vertical mastopexy for reconstruction and a symmetry right mastopexy on the right.        Family History:   Family History   Problem Relation Age of Onset    Heart disease Mother     Hypertension Mother     Skin cancer Mother     Diabetes Father     Heart disease Father     Hypertension Father     Kidney disease Father     Prostate cancer Father     Cavalier's disease Sister     Diabetes Sister     Hypertension Sister     Thyroid disease Sister     Lung cancer Maternal Grandmother     Cervical cancer Maternal Grandmother     Stroke Paternal Grandmother     Skin cancer Paternal Grandmother     Diabetes Paternal Grandfather     No Known Problems Son     No Known Problems Son     Spina bifida Other     Miscarriages / Stillbirths Other     Cervical cancer Maternal Aunt     Malig Hyperthermia Neg Hx     Breast cancer Neg Hx     Colon cancer Neg Hx      Social History:   Social History     Socioeconomic History    Marital status: Single   Tobacco Use    Smoking status: Never    Smokeless tobacco: Never   Vaping Use    Vaping status: Never Used   Substance and Sexual Activity    Alcohol use: Yes     Alcohol/week: 4.0 standard drinks of alcohol     Types: 4 Glasses of wine per week    Drug use: No     Comment: 5-6 DRINKS A WEEK ON AVERAGE    Sexual activity: Not Currently     Birth control/protection: I.U.D.     Comment: Mirena 4-17-18     Past Medical History:   Past Medical History:   Diagnosis Date    Allergic rhinitis     Anxiety 2004    TX'D WITH LEXAPRO    Chronic fatigue     Depression     Goiter 08/2016    Group B streptococcal infection during pregnancy 2001    Herniated disc, cervical     History of COVID-19      (BMI) of 30.0 to 30.9 in adult 2019    Bilateral foot pain 2019    Breathing difficulty     ARTUR (obstructive sleep apnea) 2019    Insomnia due to medical condition 2019     Resolved Ambulatory Problems     Diagnosis Date Noted    Preventative health care 2017    Tobacco use 2018     Past Medical History:   Diagnosis Date    Asthma     Deviated septum     Genital herpes in men     Obesity                 PAST SURGICAL HISTORY:    Past Surgical History:   Procedure Laterality Date    KNEE SURGERY           FAMILY HISTORY:                Family History   Problem Relation Age of Onset    Hyperlipidemia Father     Heart disease Father         CABG       SOCIAL HISTORY:          Tobacco:   Social History     Tobacco Use   Smoking Status Former Smoker    Packs/day: 0.15    Years: 20.00    Pack years: 3.00    Types: Cigarettes    Last attempt to quit: 2019    Years since quittin.2   Smokeless Tobacco Never Used   Tobacco Comment    states he smokes approximately 10 cigarettes per week       alcohol use:    Social History     Substance and Sexual Activity   Alcohol Use Yes    Frequency: 2-3 times a week    Drinks per session: 3 or 4                 Occupation:  and owner    ALLERGIES:  Review of patient's allergies indicates:  No Known Allergies    CURRENT MEDICATIONS:    Current Outpatient Medications   Medication Sig Dispense Refill    atorvastatin (LIPITOR) 40 MG tablet Take 1 tablet (40 mg total) by mouth once daily. 90 tablet 3    meloxicam (MOBIC) 15 MG tablet Take 1 tablet (15 mg total) by mouth once daily. 30 tablet 0    albuterol (PROAIR HFA) 90 mcg/actuation inhaler inhale 2 puffs every 6 hours if needed for wheezing (Patient not taking: Reported on 2019) 8.5 Inhaler 6    fluticasone propionate (FLONASE) 50 mcg/actuation nasal spray 1 spray (50 mcg total) by Each Nostril route once daily. (Patient not taking: Reported on 2019) 15.8  HPV (human papilloma virus) infection 06/10/2024    Hypertension     Malignant neoplasm of breast     Migraines     OA (osteoarthritis)     Onychomycosis     JESSE on CPAP     Perirectal abscess 2018    cont to drain and will have 2nd surgery 18    Post partum depression     Rectal fistula 2018    continues to drain and will now have 2nd surgery for it 18    SAB (spontaneous ) 2001     A1, MISSED  AT 5 WEEKS    Spinal stenosis      Past Surgical History:   Past Surgical History:   Procedure Laterality Date    BREAST BIOPSY Left     BREAST BIOPSY Right 2024    COLONOSCOPY N/A 2010    WNL PER PT    COLPOSCOPY  2024    CYST REMOVAL N/A     ON ELBOW AND FACE    FRACTURE SURGERY N/A     BROKEN COLLAR BONE    INCISION AND DRAINAGE PERIRECTAL ABSCESS N/A 2018    Procedure: INCISION AND DRAINAGE OF PERIRECTAL ABSCESS;  Surgeon: Stacie Fitzgerald MD;  Location: [facility] OR;  Service: General    INTRAUTERINE DEVICE INSERTION N/A 2018    MIRENA, EXPIRES 2019, DR. MIGUELITO ZELAYA    RECTAL FISTULOTOMY N/A 2018    Procedure: Incision and Drainage of Perirectal Abscess with drain placement;  Surgeon: Stacie Fitzgerald MD;  Location: [facility] OR;  Service: General    RECTAL FISTULOTOMY N/A 2018    Procedure: RECTAL FISTULOTOMY;  Surgeon: Stacie Fitzgerald MD;  Location: [facility] OR;  Service: General    TONSILLECTOMY AND ADENOIDECTOMY Bilateral     DURING CHILDHOOD    WISDOM TOOTH EXTRACTION Bilateral      Medications:   Current Facility-Administered Medications:     clindamycin (CLEOCIN) 900 mg in dextrose 5% 50 mL IVPB (premix), 900 mg, Intravenous, Once, Krystyna Rea MD    famotidine (PEPCID) injection 20 mg, 20 mg, Intravenous, Once, Shyla Ku MD    lactated ringers infusion, 9 mL/hr, Intravenous, Continuous, Shyla Ku MD    lidocaine (XYLOCAINE) 1 % injection 0.5 mL, 0.5 mL, Intradermal, Once PRN,  "mL 6    valACYclovir (VALTREX) 1000 MG tablet Take 1 tablet (1,000 mg total) by mouth once daily. for 5 days 5 tablet 6     No current facility-administered medications for this visit.                   REVIEW OF SYSTEMS:     Sleep related symptoms as per HPI.  CONST:Denies weight gain    HEENT: deviated septum  PULM: Denies dyspnea; no coughing or wheezing  CARD:  Denies palpitations   GI:  +occasional acid reflux  : Denies polyuria  NEURO: Denies headaches  PSYCH: Denies mood disturbance  HEME: Denies anemia   Otherwise, a balance of systems reviewed is negative.          PHYSICAL EXAM:  Vitals:    09/27/19 1315   BP: 126/82   Pulse: (!) 55   SpO2: 95%   Weight: 106.5 kg (234 lb 14.4 oz)   Height: 6' 1" (1.854 m)   PainSc: 0-No pain     Body mass index is 30.99 kg/m².     GENERAL: Normal development, well groomed  HEENT:  Conjunctivae are non-erythematous; Pupils equal, round, and reactive to light; Nose is symmetrical; Nasal mucosa is pink and moist; Septum is midline; Inferior turbinates are normal; Nasal airflow is normal; Posterior pharynx is pink; Modified Mallampati: 3; Posterior palate is normal; Tonsils +1; Uvula is normal and pink;Tongue is normal; Dentition is fair; No TMJ tenderness; Jaw opening and protrusion without click and without discomfort.  +scalloping markings on side of tongue.  NECK: Supple. Neck circumference is 17 inches. No thyromegaly. No palpable nodes.     SKIN: On face and neck: No abrasions, no rashes, no lesions.  No subcutaneous nodules are palpable.  RESPIRATORY: Chest is clear to auscultation.  Normal chest expansion and non-labored breathing at rest.  CARDIOVASCULAR: Normal S1, S2.  No murmurs, gallops or rubs. No carotid bruits bilaterally.  EXTREMITIES: No edema. No clubbing. No cyanosis. Station normal. Gait normal.        NEURO/PSYCH: Oriented to time, place and person. Normal attention span and concentration. Affect is full. Mood is normal.                                 " Shyla Ku MD    midazolam (VERSED) injection 2 mg, 2 mg, Intravenous, Q5 Min PRN, Shyla Ku MD    sodium chloride 0.9 % flush 3 mL, 3 mL, Intravenous, Q12H, Shyla Ku MD    sodium chloride 0.9 % flush 3-10 mL, 3-10 mL, Intravenous, PRN, Shyla Ku MD  Allergies:   Allergies   Allergen Reactions    Penicillins Anaphylaxis and Other (See Comments)     Beta lactam allergy details  Antibiotic reaction: other (SWELLING)  Age at reaction: child  Dose to reaction time: unknown  Reason for antibiotic: unknown  Epinephrine required for reaction?: unknown  Tolerated antibiotics: unknown       Adhesive Tape Rash     Skin irritation and blisters    Sulfa Antibiotics Rash     ?  Review of Systems:   Constitutional: Negative for fevers/chills  H&N: Negative for sore throat/rhinorrhea.  Eyes: Negative for dry eye/visual disturbances.  Respiratory: Negative for cough or dyspnea.    Cardiovascular: Negative for cyanosis or chest pain.   Gastrointestinal: Negative for nausea/emesis.  Genitourinary: Negative for frequency/dysuria.   Musculoskeletal: Negative for muscle or joint pain.  Skin: Negative for new rash/lesions.  Endocrine: Negative for excessive thirst/feeling cold.  Hematology: Negative for easy bruising/bleeding.  Neurological: Negative for headaches/tinnitus.    Physical Examination:   /88 (BP Location: Left arm, Patient Position: Sitting)   Pulse 75   Temp 98.1 °F (36.7 °C) (Oral)   Resp 16   Wt 97.2 kg (214 lb 4.6 oz)   LMP  (LMP Unknown)   SpO2 96%   BMI 33.56 kg/m²   Body mass index is 33.56 kg/m².     General: Well nourished, well developed, in no acute distress  Eyes: PERRLA, EOM intact, sclerae anicteric, no conjunctival injection  HENT: Normocephalic, atraumatic, mucous membranes moist  Neck: Supple, no thyromegaly, no lymphadenopathy, trachea midline  Respiratory: Negative for wheezing or stridor, non-labored respirations   Cardiovascular: Normal               DATA   9/19/19 ratio 77%; fvc 102%; fev1 99%; dlco 113%    Lab Results   Component Value Date    TSH 1.557 05/17/2017     ASSESSMENT/PLAN  Problem List Items Addressed This Visit     Insomnia due to medical condition    Overview     Maintenance is the issue.         Current Assessment & Plan     Untreated mily vs irregular sleep wake cycle a/w work.           MILY (obstructive sleep apnea) - Primary    Current Assessment & Plan     The patient symptomatically has loud snoring, restless sleep with findings of enlarged neck, high grade mallampatti. This warrants further investigation for possible obstructive sleep apnea.  Patient will be contacted after sleep study is done.            Relevant Orders    Home Sleep Studies          Deviated septum - ent appointment pending with Dr. Holland.      Diagnostic: Polysomnogram. The nature of this procedure and its indication was discussed with the patient.     Education: During our discussion today, we talked about the etiology of obstructive sleep apnea as well as the potential ramifications of untreated sleep apnea, which could include daytime sleepiness, hypertension, heart disease and/or stroke.     Precautions: The patient was advised to abstain from driving should they feel sleepy or drowsy.       Thank you for allowing me the opportunity to participate in the care of your patient.    Patient will Follow up for after sleep study. with md/np.    Please cc note to  Adriano Shelby MD.    This is 45 minutes visit, over 50% of time spent in direct consultation with patient.         "rate, negative for cyannosis, negative for peripheral edema  Abdominal: Soft, non-distended.  Musculoskeletal: Warm and dry extremities. Negative for motor deficits. Able to stand from seated position easily.  Psychiatric: Appropriate affect, cooperative demeanor.  Neurologic: Oriented x 3, negative for gross motor or sensory deficits.  Skin: No rashes or open wounds.  Breasts: grade 1-2 ptosis bilaterally      Labs:  Lab Results   Component Value Date    WBC 5.87 09/06/2024    HGB 14.6 09/06/2024    HCT 43.0 09/06/2024    MCV 95.6 09/06/2024     09/06/2024     Lab Results   Component Value Date    GLUCOSE 115 (H) 09/06/2024    BUN 14 09/06/2024    CREATININE 0.77 09/06/2024    EGFRIFNONA 85 07/26/2018    BCR 18.2 09/06/2024    K 4.1 09/06/2024    CO2 24.9 09/06/2024    CALCIUM 9.7 09/06/2024     Lab Results   Component Value Date    INR 1.16 (H) 06/19/2018    PROTIME 14.6 (H) 06/19/2018     Lab Results   Component Value Date    PTT 32.1 06/19/2018     No results found for: \"HGBA1C\"  No results found for: \"ABORH\"      Recent Imaging:   No radiology results for the last day     Assessment and Plan:  Chante Harrell is a 55 y.o. female here today with new left breast cancer and right intraductal papilloma, planning for left lumpectomy with vertical mastopexy for reconstruction and a symmetry right mastopexy on the right.      -Patient was seen and examined and marked in pre-op holding.  -ERAS pain protocol started preop  -Procedure details, risks, and benefits were discussed with the patient and informed consent discussion was performed.  -Questions answered to the patient's satisfaction.        Marco Tran MD  09/12/24  07:27 EDT           "

## 2025-02-17 ENCOUNTER — TELEPHONE (OUTPATIENT)
Dept: PRIMARY CARE CLINIC | Facility: CLINIC | Age: 55
End: 2025-02-17
Payer: COMMERCIAL

## 2025-02-17 DIAGNOSIS — L64.9 ANDROGENIC ALOPECIA: ICD-10-CM

## 2025-02-17 DIAGNOSIS — J45.20 MILD INTERMITTENT ASTHMA WITHOUT COMPLICATION: ICD-10-CM

## 2025-02-17 DIAGNOSIS — A60.00 GENITAL HERPES SIMPLEX, UNSPECIFIED SITE: ICD-10-CM

## 2025-02-17 DIAGNOSIS — E78.2 MIXED HYPERLIPIDEMIA: ICD-10-CM

## 2025-02-17 RX ORDER — ALBUTEROL SULFATE 90 UG/1
INHALANT RESPIRATORY (INHALATION)
Qty: 8.5 G | Refills: 11 | Status: SHIPPED | OUTPATIENT
Start: 2025-02-17

## 2025-02-17 RX ORDER — ATORVASTATIN CALCIUM 40 MG/1
40 TABLET, FILM COATED ORAL DAILY
Qty: 90 TABLET | Refills: 3 | Status: SHIPPED | OUTPATIENT
Start: 2025-02-17

## 2025-02-17 RX ORDER — FLUTICASONE PROPIONATE AND SALMETEROL 100; 50 UG/1; UG/1
1 POWDER RESPIRATORY (INHALATION) 2 TIMES DAILY
Qty: 60 EACH | Refills: 11 | Status: SHIPPED | OUTPATIENT
Start: 2025-02-17

## 2025-02-17 RX ORDER — VALACYCLOVIR HYDROCHLORIDE 1 G/1
1000 TABLET, FILM COATED ORAL DAILY
Qty: 30 TABLET | Refills: 3 | Status: SHIPPED | OUTPATIENT
Start: 2025-02-17 | End: 2026-02-17

## 2025-02-17 RX ORDER — SILDENAFIL 100 MG/1
100 TABLET, FILM COATED ORAL DAILY PRN
Qty: 30 TABLET | Refills: 3 | Status: SHIPPED | OUTPATIENT
Start: 2025-02-17

## 2025-03-10 ENCOUNTER — OFFICE VISIT (OUTPATIENT)
Dept: SPORTS MEDICINE | Facility: CLINIC | Age: 55
End: 2025-03-10
Payer: COMMERCIAL

## 2025-03-10 ENCOUNTER — TELEPHONE (OUTPATIENT)
Dept: PRIMARY CARE CLINIC | Facility: CLINIC | Age: 55
End: 2025-03-10
Payer: COMMERCIAL

## 2025-03-10 ENCOUNTER — HOSPITAL ENCOUNTER (OUTPATIENT)
Dept: RADIOLOGY | Facility: HOSPITAL | Age: 55
Discharge: HOME | End: 2025-03-10
Attending: ORTHOPAEDIC SURGERY
Payer: COMMERCIAL

## 2025-03-10 VITALS
HEART RATE: 62 BPM | HEIGHT: 73 IN | DIASTOLIC BLOOD PRESSURE: 71 MMHG | BODY MASS INDEX: 33.65 KG/M2 | WEIGHT: 253.88 LBS | SYSTOLIC BLOOD PRESSURE: 111 MMHG

## 2025-03-10 DIAGNOSIS — S43.004A SHOULDER SEPARATION, RIGHT, INITIAL ENCOUNTER: ICD-10-CM

## 2025-03-10 DIAGNOSIS — S43.004A SHOULDER SEPARATION, RIGHT, INITIAL ENCOUNTER: Primary | ICD-10-CM

## 2025-03-10 PROCEDURE — 99204 OFFICE O/P NEW MOD 45 MIN: CPT | Mod: S$GLB,,, | Performed by: ORTHOPAEDIC SURGERY

## 2025-03-10 PROCEDURE — 73030 X-RAY EXAM OF SHOULDER: CPT | Mod: TC,RT

## 2025-03-10 PROCEDURE — 3074F SYST BP LT 130 MM HG: CPT | Mod: CPTII,S$GLB,, | Performed by: ORTHOPAEDIC SURGERY

## 2025-03-10 PROCEDURE — 1159F MED LIST DOCD IN RCRD: CPT | Mod: CPTII,S$GLB,, | Performed by: ORTHOPAEDIC SURGERY

## 2025-03-10 PROCEDURE — 73030 X-RAY EXAM OF SHOULDER: CPT | Mod: 26,RT,, | Performed by: RADIOLOGY

## 2025-03-10 PROCEDURE — 3078F DIAST BP <80 MM HG: CPT | Mod: CPTII,S$GLB,, | Performed by: ORTHOPAEDIC SURGERY

## 2025-03-10 PROCEDURE — 99999 PR PBB SHADOW E&M-EST. PATIENT-LVL III: CPT | Mod: PBBFAC,,, | Performed by: ORTHOPAEDIC SURGERY

## 2025-03-10 PROCEDURE — 3008F BODY MASS INDEX DOCD: CPT | Mod: CPTII,S$GLB,, | Performed by: ORTHOPAEDIC SURGERY

## 2025-03-10 NOTE — PROGRESS NOTES
CC: right shoulder pain     54 y.o. Male  at [facility] reports he had a right shoulder injury on 3/7/25 while skiing. He presented to ED in [place] where they gave him a sling which he has been wearing since. He reports that the pain is severe and not responding to any conservative care. It also bothers him at night.    Is affecting ADLs.     SANE: 20    Review of Systems   Constitution: Negative. Negative for chills, fever and night sweats.   HENT: Negative for congestion and headaches.    Eyes: Negative for blurred vision, left vision loss and right vision loss.   Cardiovascular: Negative for chest pain and syncope.   Respiratory: Negative for cough and shortness of breath.    Endocrine: Negative for polydipsia, polyphagia and polyuria.   Hematologic/Lymphatic: Negative for bleeding problem. Does not bruise/bleed easily.   Skin: Negative for dry skin, itching and rash.   Musculoskeletal: Negative for falls and muscle weakness.   Gastrointestinal: Negative for abdominal pain and bowel incontinence.   Genitourinary: Negative for bladder incontinence and nocturia.   Neurological: Negative for disturbances in coordination, loss of balance and seizures.   Psychiatric/Behavioral: Negative for depression. The patient does not have insomnia.    Allergic/Immunologic: Negative for hives and persistent infections.     PAST MEDICAL HISTORY:   Past Medical History:   Diagnosis Date    ACL tear 3/9/2016    Asthma     Deviated septum     Genital herpes in men     Hyperlipidemia     Obesity     Tobacco use 11/28/2018     PAST SURGICAL HISTORY:   Past Surgical History:   Procedure Laterality Date    COLONOSCOPY N/A 7/22/2021    Procedure: COLONOSCOPY;  Surgeon: Leo Delatorre MD;  Location: [address];  Service: Endoscopy;  Laterality: N/A;  1st colonoscopy - screening  pt fully vaccinated-BB    ESOPHAGOGASTRODUODENOSCOPY N/A 7/22/2021    Procedure: EGD (ESOPHAGOGASTRODUODENOSCOPY);  Surgeon: Leo Delatorre MD;   "Location: [facility]  Service: Endoscopy;  Laterality: N/A;  dysphagia to solid foods  pt fully vaccinated-BB    KNEE SURGERY       FAMILY HISTORY:   Family History   Problem Relation Name Age of Onset    Hyperlipidemia Father      Heart disease Father          CABG    Cancer Paternal Grandfather          lung     SOCIAL HISTORY: Social History[1]    MEDICATIONS: Current Medications[2]  ALLERGIES: Review of patient's allergies indicates:  No Known Allergies    VITAL SIGNS: /71 (Patient Position: Sitting)   Pulse 62   Ht 6' 1" (1.854 m)   Wt 115.2 kg (253 lb 13.8 oz)   BMI 33.49 kg/m²      PHYSICAL EXAMINATION:  General:  The patient is alert and oriented x 3.  Mood is pleasant.  Observation of ears, eyes and nose reveal no gross abnormalities.  No labored breathing observed.  Gait is coordinated. Patient can toe walk and heel walk without difficulty.      right SHOULDER / UPPER EXTREMITY EXAM    OBSERVATION:     Swelling  none  Deformity  Displaced distal clavicle superiorly   Discoloration  none   Scapular winging none   Scars   none  Atrophy  none    TENDERNESS / CREPITUS (T/C):          T/C      T/C   Clavicle   -/-  SUPRAspinatus    -/-     AC Jt.    +/-  INFRAspinatus  -/-    SC Jt.    -/-  Deltoid    -/-      G. Tuberosity  -/-  LH BICEP groove  -/-   Acromion:  -/-  Midline Neck   -/-     Scapular Spine -/-  Trapezium   -/-   SMA Scapula  -/-  GH jt. line - post  -/-     Scapulothoracic  -/-         ROM: (* = with pain)  Right shoulder   Left shoulder        AROM (PROM)   AROM (PROM)   FE    Deferred due to pain    170° (175°)     ER at 0°    Deferred due to pain    60°  (65°)   ER at 90° ABD  Deferred due to pain    90°  (90°)   IR at 90°  ABD   Deferred due to pain    NA  (40°)       STRENGTH: (* = with pain) Deferred due to pain         EXTREMITY NEURO-VASCULAR EXAM    Sensation grossly intact to light touch all dermatomal regions.    DTR 2+ Biceps, Triceps, BR and Negative " Lindsays sign   Grossly intact motor function at Elbow, Wrist and Hand   Distal pulses radial and ulnar 2+, brisk cap refill, symmetric.      NECK:  Painless FROM and spinous processes non-tender. Negative Spurlings sign.      OTHER FINDINGS:  none    XRAYS reviewed and interpreted personally by me:  Shoulder trauma series right, were obtained and reviewed and show a right shoulder AC joint separation           ASSESSMENT:   right AC joint separation    PLAN:    MRI right shoulder wo contrast  Sling RUE  RTC in 2 weeks     All questions were answered, pt will contact us for questions or concerns in the interim.    I made the decision to obtain old records of the patient including previous notes and imaging. New imaging was ordered today of the extremity or extremities evaluated. I independently reviewed and interpreted the radiographs and/or MRIs today as well as prior imaging.         [1]   Social History  Socioeconomic History    Marital status: Single   Occupational History    Occupation: /Restaurant Owner     Comment: [employer]   Tobacco Use    Smoking status: Light Smoker     Current packs/day: 0.00     Average packs/day: 0.2 packs/day for 20.0 years (3.0 ttl pk-yrs)     Types: Cigars, Cigarettes     Start date: 1999     Last attempt to quit: 2019     Years since quittin.6    Smokeless tobacco: Former    Tobacco comments:     states he smokes approximately 10 cigarettes per week   Substance and Sexual Activity    Alcohol use: Yes     Alcohol/week: 14.0 standard drinks of alcohol     Types: 14 Shots of liquor per week    Drug use: Yes     Types: Marijuana    Sexual activity: Yes     Partners: Female     Comment: Does not use barrier protection; does not know if his partner  uses any contraception   [2]   Current Outpatient Medications:     albuterol (PROVENTIL/VENTOLIN HFA) 90 mcg/actuation inhaler, INHALE 2 PUFFS BY MOUTH EVERY 6 HOURS AS NEEDED FOR WHEEZING, Disp: 8.5 g, Rfl:  11    atorvastatin (LIPITOR) 40 MG tablet, Take 1 tablet (40 mg total) by mouth once daily., Disp: 90 tablet, Rfl: 3    azelastine (ASTELIN) 137 mcg (0.1 %) nasal spray, 1 spray (137 mcg total) by Nasal route 2 (two) times daily., Disp: 30 mL, Rfl: 3    finasteride (PROPECIA) 1 mg tablet, Take 1 tablet (1 mg total) by mouth once daily., Disp: 30 tablet, Rfl: 3    fluticasone-salmeterol diskus inhaler 100-50 mcg, Inhale 1 puff into the lungs 2 (two) times a day. Controller, Disp: 60 each, Rfl: 11    meloxicam (MOBIC) 15 MG tablet, Take 1 tablet (15 mg total) by mouth once daily., Disp: 30 tablet, Rfl: 0    sildenafiL (VIAGRA) 100 MG tablet, Take 1 tablet (100 mg total) by mouth daily as needed for Erectile Dysfunction., Disp: 30 tablet, Rfl: 3    tobramycin sulfate 0.3% (TOBREX) 0.3 % ophthalmic solution, 1-2 drops topically twice daily to affected toe(s)., Disp: 5 mL, Rfl: 3    valACYclovir (VALTREX) 1000 MG tablet, Take 1 tablet (1,000 mg total) by mouth once daily., Disp: 30 tablet, Rfl: 3

## 2025-03-10 NOTE — TELEPHONE ENCOUNTER
Pt fell while skiing 3 days ago (landed his right shoulder on a metal water bottle during the fall).  Went to an urgent care for evaluation. Xrays reportedly showed a grade III separation.  Needs ortho evaluation.  Given ibuprofen 600 mg TID and Dilaudid 1 mg prn (12 tablets) for pain control.

## 2025-03-19 ENCOUNTER — PATIENT MESSAGE (OUTPATIENT)
Dept: SPORTS MEDICINE | Facility: CLINIC | Age: 55
End: 2025-03-19
Payer: COMMERCIAL

## 2025-03-24 ENCOUNTER — TELEPHONE (OUTPATIENT)
Dept: PRIMARY CARE CLINIC | Facility: CLINIC | Age: 55
End: 2025-03-24
Payer: COMMERCIAL

## 2025-03-24 ENCOUNTER — OFFICE VISIT (OUTPATIENT)
Dept: SPORTS MEDICINE | Facility: CLINIC | Age: 55
End: 2025-03-24
Payer: COMMERCIAL

## 2025-03-24 VITALS
HEIGHT: 73 IN | WEIGHT: 253.75 LBS | HEART RATE: 76 BPM | SYSTOLIC BLOOD PRESSURE: 124 MMHG | BODY MASS INDEX: 33.63 KG/M2 | DIASTOLIC BLOOD PRESSURE: 73 MMHG

## 2025-03-24 DIAGNOSIS — S43.004A SHOULDER SEPARATION, RIGHT, INITIAL ENCOUNTER: Primary | ICD-10-CM

## 2025-03-24 PROCEDURE — 3074F SYST BP LT 130 MM HG: CPT | Mod: CPTII,S$GLB,, | Performed by: ORTHOPAEDIC SURGERY

## 2025-03-24 PROCEDURE — 3078F DIAST BP <80 MM HG: CPT | Mod: CPTII,S$GLB,, | Performed by: ORTHOPAEDIC SURGERY

## 2025-03-24 PROCEDURE — 1159F MED LIST DOCD IN RCRD: CPT | Mod: CPTII,S$GLB,, | Performed by: ORTHOPAEDIC SURGERY

## 2025-03-24 PROCEDURE — 3008F BODY MASS INDEX DOCD: CPT | Mod: CPTII,S$GLB,, | Performed by: ORTHOPAEDIC SURGERY

## 2025-03-24 PROCEDURE — 99214 OFFICE O/P EST MOD 30 MIN: CPT | Mod: S$GLB,,, | Performed by: ORTHOPAEDIC SURGERY

## 2025-03-24 PROCEDURE — 99999 PR PBB SHADOW E&M-EST. PATIENT-LVL III: CPT | Mod: PBBFAC,,, | Performed by: ORTHOPAEDIC SURGERY

## 2025-03-24 NOTE — PROGRESS NOTES
CC: right shoulder pain     54 y.o. Male  at [facility] reports he had a right shoulder injury on 3/7/25 while skiing. He presented to ED in [place] where they gave him a sling which he has been wearing since. He reports that the pain is severe and not responding to any conservative care. It also bothers him at night.    Is affecting ADLs.     SANE: 20    Review of Systems   Constitution: Negative. Negative for chills, fever and night sweats.   HENT: Negative for congestion and headaches.    Eyes: Negative for blurred vision, left vision loss and right vision loss.   Cardiovascular: Negative for chest pain and syncope.   Respiratory: Negative for cough and shortness of breath.    Endocrine: Negative for polydipsia, polyphagia and polyuria.   Hematologic/Lymphatic: Negative for bleeding problem. Does not bruise/bleed easily.   Skin: Negative for dry skin, itching and rash.   Musculoskeletal: Negative for falls and muscle weakness.   Gastrointestinal: Negative for abdominal pain and bowel incontinence.   Genitourinary: Negative for bladder incontinence and nocturia.   Neurological: Negative for disturbances in coordination, loss of balance and seizures.   Psychiatric/Behavioral: Negative for depression. The patient does not have insomnia.    Allergic/Immunologic: Negative for hives and persistent infections.     PAST MEDICAL HISTORY:   Past Medical History:   Diagnosis Date    ACL tear 3/9/2016    Asthma     Deviated septum     Genital herpes in men     Hyperlipidemia     Obesity     Tobacco use 11/28/2018     PAST SURGICAL HISTORY:   Past Surgical History:   Procedure Laterality Date    COLONOSCOPY N/A 7/22/2021    Procedure: COLONOSCOPY;  Surgeon: Leo Delatorre MD;  Location: [address];  Service: Endoscopy;  Laterality: N/A;  1st colonoscopy - screening  pt fully vaccinated-BB    ESOPHAGOGASTRODUODENOSCOPY N/A 7/22/2021    Procedure: EGD (ESOPHAGOGASTRODUODENOSCOPY);  Surgeon: Leo Delatorre MD;   "Location: [facility] (4TH FLR);  Service: Endoscopy;  Laterality: N/A;  dysphagia to solid foods  pt fully vaccinated-BB    KNEE SURGERY       FAMILY HISTORY:   Family History   Problem Relation Name Age of Onset    Hyperlipidemia Father      Heart disease Father          CABG    Cancer Paternal Grandfather          lung     SOCIAL HISTORY: Social History[1]    MEDICATIONS: Current Medications[2]  ALLERGIES: Review of patient's allergies indicates:  No Known Allergies    VITAL SIGNS: /73   Pulse 76   Ht 6' 1" (1.854 m)   Wt 115.1 kg (253 lb 12 oz)   BMI 33.48 kg/m²      PHYSICAL EXAMINATION:  General:  The patient is alert and oriented x 3.  Mood is pleasant.  Observation of ears, eyes and nose reveal no gross abnormalities.  No labored breathing observed.  Gait is coordinated. Patient can toe walk and heel walk without difficulty.      right SHOULDER / UPPER EXTREMITY EXAM    OBSERVATION:     Swelling  none  Deformity  Displaced distal clavicle superiorly   Discoloration  none   Scapular winging none   Scars   none  Atrophy  none    TENDERNESS / CREPITUS (T/C):          T/C      T/C   Clavicle   -/-  SUPRAspinatus    -/-     AC Jt.    +/-  INFRAspinatus  -/-    SC Jt.    -/-  Deltoid    -/-      G. Tuberosity  -/-  LH BICEP groove  -/-   Acromion:  -/-  Midline Neck   -/-     Scapular Spine -/-  Trapezium   -/-   SMA Scapula  -/-  GH jt. line - post  -/-     Scapulothoracic  -/-         ROM: (* = with pain)  Right shoulder   Left shoulder        AROM (PROM)   AROM (PROM)   FE    Deferred due to pain    170° (175°)     ER at 0°    Deferred due to pain    60°  (65°)   ER at 90° ABD  Deferred due to pain    90°  (90°)   IR at 90°  ABD   Deferred due to pain    NA  (40°)       STRENGTH: (* = with pain) Deferred due to pain         EXTREMITY NEURO-VASCULAR EXAM    Sensation grossly intact to light touch all dermatomal regions.    DTR 2+ Biceps, Triceps, BR and Negative Lindsays sign   Grossly intact motor " function at Elbow, Wrist and Hand   Distal pulses radial and ulnar 2+, brisk cap refill, symmetric.      NECK:  Painless FROM and spinous processes non-tender. Negative Spurlings sign.      OTHER FINDINGS:  none    XRAYS reviewed and interpreted personally by me:  Shoulder trauma series right, were obtained and reviewed and show a right shoulder AC joint separation           ASSESSMENT:   right AC joint separation, labral tear    PLAN:    MRI right shoulder wo contrast report and images reviewed  Sling RUE wean as beryl  RTC in 6 weeks    All questions were answered, pt will contact us for questions or concerns in the interim.    I made the decision to obtain old records of the patient including previous notes and imaging. New imaging was ordered today of the extremity or extremities evaluated. I independently reviewed and interpreted the radiographs and/or MRIs today as well as prior imaging.           [1]   Social History  Socioeconomic History    Marital status: Single   Occupational History    Occupation: /Restaurant Owner     Comment: [employer]   Tobacco Use    Smoking status: Light Smoker     Current packs/day: 0.00     Average packs/day: 0.2 packs/day for 20.0 years (3.0 ttl pk-yrs)     Types: Cigars, Cigarettes     Start date: 1999     Last attempt to quit: 2019     Years since quittin.7    Smokeless tobacco: Former    Tobacco comments:     states he smokes approximately 10 cigarettes per week   Substance and Sexual Activity    Alcohol use: Yes     Alcohol/week: 14.0 standard drinks of alcohol     Types: 14 Shots of liquor per week    Drug use: Yes     Types: Marijuana    Sexual activity: Yes     Partners: Female     Comment: Does not use barrier protection; does not know if his partner  uses any contraception   [2]   Current Outpatient Medications:     albuterol (PROVENTIL/VENTOLIN HFA) 90 mcg/actuation inhaler, INHALE 2 PUFFS BY MOUTH EVERY 6 HOURS AS NEEDED FOR WHEEZING, Disp:  8.5 g, Rfl: 11    azelastine (ASTELIN) 137 mcg (0.1 %) nasal spray, 1 spray (137 mcg total) by Nasal route 2 (two) times daily., Disp: 30 mL, Rfl: 3    atorvastatin (LIPITOR) 40 MG tablet, Take 1 tablet (40 mg total) by mouth once daily. (Patient not taking: Reported on 3/24/2025), Disp: 90 tablet, Rfl: 3    finasteride (PROPECIA) 1 mg tablet, Take 1 tablet (1 mg total) by mouth once daily. (Patient not taking: Reported on 3/24/2025), Disp: 30 tablet, Rfl: 3    fluticasone-salmeterol diskus inhaler 100-50 mcg, Inhale 1 puff into the lungs 2 (two) times a day. Controller (Patient not taking: Reported on 3/24/2025), Disp: 60 each, Rfl: 11    meloxicam (MOBIC) 15 MG tablet, Take 1 tablet (15 mg total) by mouth once daily. (Patient not taking: Reported on 3/24/2025), Disp: 30 tablet, Rfl: 0    sildenafiL (VIAGRA) 100 MG tablet, Take 1 tablet (100 mg total) by mouth daily as needed for Erectile Dysfunction. (Patient not taking: Reported on 3/24/2025), Disp: 30 tablet, Rfl: 3    tobramycin sulfate 0.3% (TOBREX) 0.3 % ophthalmic solution, 1-2 drops topically twice daily to affected toe(s). (Patient not taking: Reported on 3/24/2025), Disp: 5 mL, Rfl: 3    valACYclovir (VALTREX) 1000 MG tablet, Take 1 tablet (1,000 mg total) by mouth once daily. (Patient not taking: Reported on 3/24/2025), Disp: 30 tablet, Rfl: 3

## 2025-03-25 NOTE — TELEPHONE ENCOUNTER
----- Message from Theresa sent at 3/24/2025  5:35 PM CDT -----  Type:  Sooner Apoointment RequestCaller is requesting a sooner appointment.  Caller declined first available appointment listed below.  Caller will not accept being placed on the waitlist and is requesting a message be sent to doctor.Name of Caller:STEFFANIE MOHAN [[id number]]When is the first available appointment?7/21Would the patient rather a call back or a response via [facility]? Call back Best Call Back Number:[phone] Additional Information: Patient would like to be seen at the soonest availability for an annual visit if possible. Patient would like a call back with further assistance.

## 2025-03-27 ENCOUNTER — CLINICAL SUPPORT (OUTPATIENT)
Dept: REHABILITATION | Facility: HOSPITAL | Age: 55
End: 2025-03-27
Attending: ORTHOPAEDIC SURGERY
Payer: COMMERCIAL

## 2025-03-27 DIAGNOSIS — S43.004A SHOULDER SEPARATION, RIGHT, INITIAL ENCOUNTER: ICD-10-CM

## 2025-03-27 DIAGNOSIS — M25.511 ACUTE PAIN OF RIGHT SHOULDER: Primary | ICD-10-CM

## 2025-03-27 PROBLEM — M25.671 ANKLE JOINT STIFFNESS, BILATERAL: Status: RESOLVED | Noted: 2023-08-25 | Resolved: 2025-03-27

## 2025-03-27 PROBLEM — M79.671 RIGHT FOOT PAIN: Status: RESOLVED | Noted: 2023-08-25 | Resolved: 2025-03-27

## 2025-03-27 PROBLEM — M25.672 ANKLE JOINT STIFFNESS, BILATERAL: Status: RESOLVED | Noted: 2023-08-25 | Resolved: 2025-03-27

## 2025-03-27 PROCEDURE — 97161 PT EVAL LOW COMPLEX 20 MIN: CPT | Performed by: PHYSICAL THERAPIST

## 2025-03-27 NOTE — PROGRESS NOTES
Physical Therapy Evaluation    Patient Name: Erich Chery  MRN: [id number]  YOB: 1970  Encounter Date: 3/27/2025    Therapy Diagnosis:   Encounter Diagnoses   Name Primary?    Shoulder separation, right, initial encounter     Acute pain of right shoulder Yes     Physician: Aidee Jackson MD    Physician Orders: Eval and Treat  Medical Diagnosis: Shoulder separation, right, initial encounter    Visit # / Visits Authorized:  1 / 1  Insurance Authorization Period: 4/8/2025 to 12/31/2025  Date of Evaluation: 3/27/2025  Plan of Care Certification: 3/27/2025 to 9/27/2025     Time In: 1415   Time Out: 1500  Total Time: 45   Total Billable Time: 45    Intake Outcome Measure for FOTO Survey    Therapist reviewed FOTO scores for Erich Chery on 3/27/2025.   FOTO report - see Media section or FOTO account episode details.     Intake Score: 55%         Subjective   History of Present Illness  Erich is a 54 y.o. male who reports to physical therapy with a chief concern of Right Shoulder Pain.     The patient reports a medical diagnosis of S43.004A (ICD-10-CM) - Shoulder separation, right, initial encounter.    Diagnostic tests related to this condition: MRI studies.   MRI Studies Details: Right Labrum tear and AC joint separation    History of Present Condition/Illness: Patient is a 54 y.o. male presenting to clinic after a ski accident occurred in [place] on March 7th. He was about to go over a jump and tried to tuck out of it and landed on his shoulder and started to roll. He notes smashing his water bottle from the force of rolling on the right shoulder. 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 He was recently taken out the sling by Dr. Jackson. He is hoping to avoid surgery on the shoulder.     Activities of Daily Living  Social history was obtained from Patient.    General Prior Level of Function Comments: No difficulty with lifting, reaching, or  carrying  General Current Level of Function Comments: Severe difficulty with lifting, reaching, and carrying           Pain     Patient reports a current pain level of 4/10. Pain at best is reported as 3/10. Pain at worst is reported as 7/10.   Location: R Shoulder  Clinical Progression (since onset): Stable  Pain Qualities: Aching, Sharp, Discomfort, Throbbing  Pain-Relieving Factors: Ice, Rest  Pain-Aggravating Factors: Holding objects, Reaching, Lifting         Employment  Patient reports: Does the patient's condition impact their ability to work?  Employment Status: Employed full-time   [employer] owner/      Past Medical History/Physical Systems Review:   Erich Chery  has a past medical history of ACL tear, Asthma, Deviated septum, Genital herpes in men, Hyperlipidemia, Obesity, and Tobacco use.    Erich Chery  has a past surgical history that includes Knee surgery; Esophagogastroduodenoscopy (N/A, 7/22/2021); and Colonoscopy (N/A, 7/22/2021).    Erich has a current medication list which includes the following prescription(s): albuterol, atorvastatin, azelastine, finasteride, fluticasone-salmeterol 100-50 mcg/dose, meloxicam, sildenafil, tobramycin sulfate 0.3%, and valacyclovir.    Review of patient's allergies indicates:  No Known Allergies     Objective   Bracing  Patient presents with a Right shoulder brace. The shoulder brace type is Clavicular support.  Sling not present today, notes recently coming out of it        Posture        Shoulders are Depressed.             Shoulder Observations  Right Shoulder Observations  Present: Deformity  Shoulder Deformity Details: Elevated clavicle with step off AC joint           Upper Extremity Sensation  General Upper Extremity Sensation  Intact: Right and Left  Right Upper Extremity Sensation  Intact: Light Touch, Sharp/Dull Discrimination, Kinesthesia, Proprioception, and Hot/Cold Discrimination  Right Upper Extremity Sensation Stocking Glove  Pattern: No    Left Upper Extremity Sensation  Intact: Light Touch, Sharp/Dull Discrimination, Kinesthesia, Proprioception, and Hot/Cold Discrimination  Left Upper Extremity Sensation Stocking Glove Pattern: No             Shoulder Palpation  Right Shoulder Palpation  Abnormal: Bony Prominence/Bursa  Right Shoulder Bony Prominence/Bursa Palpation Observations: AC joint                   Shoulder Range of Motion  Right Shoulder   Active (deg) Passive (deg) Pain   Flexion 140 150     Extension 20 20     Scaption         ABduction 140 150     ADduction         Horizontal ABduction         Horizontal ADduction         External Rotation (Shoulder ABducted 0 degrees) 40 40     External Rotation (Shoulder ABducted 45 degrees) 90 90     External Rotation (Shoulder ABducted 90 degrees) 90 90     Internal Rotation (Shoulder ABducted 0 degrees) 90 90     Internal Rotation (Shoulder ABducted 45 degrees) 30 30     Internal Rotation (Shoulder ABducted 90 degrees) 30 30       Left Shoulder   Active (deg) Passive (deg) Pain   Flexion 180 180     Extension 20 20     Scaption         ABduction 180 180     ADduction         Horizontal ABduction         Horizontal ADduction         External Rotation (Shoulder ABducted 0 degrees) 40 40     External Rotation (Shoulder ABducted 45 degrees) 90 90     External Rotation (Shoulder ABducted 90 degrees) 90 90     Internal Rotation (Shoulder ABducted 0 degrees) 30 30     Internal Rotation (Shoulder ABducted 45 degrees) 30 30     Internal Rotation (Shoulder ABducted 90 degrees) 30 30              Elbow/Forearm Range of Motion   Right Elbow/Forearm   Active (deg) Passive (deg) Pain   Flexion 120 120     Extension 0 0     Forearm Pronation         Forearm Supination           Left Elbow/Forearm   Active (deg) Passive (deg) Pain   Flexion 120 120     Extension 0 0     Forearm Pronation         Forearm Supination                       Shoulder Strength - Planes of Motion   Right Strength Right Pain  Left Strength Left  Pain   Flexion 4   5     Extension           ABduction 4+   5     ADduction           Horizontal ABduction           Horizontal ADduction           Internal Rotation 0° 5   5     Internal Rotation 90° 5   5     External Rotation 0° 4+   5     External Rotation 90° 4   5         Shoulder Strength - Rotator Cuff Muscles   Right Strength Right Pain Left Strength Left  Pain   Supraspinatus           Infraspinatus 4+   5     Teres Minor           Subscapularis 4+   5       Shoulder Strength - Scapular Stabilizing Muscles   Right Strength Right Pain Left Strength Left  Pain   Lower Trapezius 3-   5     Middle Trapezius 3-   5     Rhomboids                       Shoulder Special Tests  Shoulder Stability Tests  Positive: Right Sulcus Sign  Rotator Cuff Tests  Positive: Right Empty Can  Negative: Right Drop Arm and Right External Rotation Lag Sign  Impingement Tests  Negative: Right Infraspinatus Muscle           Shoulder Joint Mobility  Right Shoulder Mobility  Hypermobile: Acromioclavicular  Left Shoulder Mobility  Normal: Acromioclavicular                        Treatment:       Time Entry(in minutes):  PT Evaluation (Low) Time Entry: 45    Assessment & Plan   Assessment  Erich presents with a condition of Low complexity.   Presentation of Symptoms: Stable       Functional Limitations: Activity tolerance, Completing self-care activities, Proprioception, Range of motion, Participating in leisure activities, Pain with ADLs/IADLs, Carrying objects, Disrupted sleep pattern, Pain when reaching, Reaching  Impairments: Pain with functional activity, Impaired physical strength  Personal Factors Affecting Prognosis: Pain    Patient Goal for Therapy (PT): avoid surgery on the shoulder  Prognosis: Good  Assessment Details: Patient is a 54 y.o. male presenting following R AC joint separation. He is hoping to avoid surgery on the right shoulder. He presents with limited motion, strength, and function at work as a  . He requires assistance for work and ADLs due to recent injury. He will benefit from scap stabilization as he has excessive early upward rotation of the scapula.     Plan  From a physical therapy perspective, the patient would benefit from: Skilled Rehab Services    Planned therapy interventions include: Therapeutic exercise, Therapeutic activities, Neuromuscular re-education, Manual therapy, ADLs/IADLs, and Other (Comment). Dry Needling (prn)  Planned modalities to include: Biofeedback, Electrical stimulation - attended, Electrical stimulation - passive/unattended, Thermotherapy (hot pack), and Cryotherapy (cold pack).        Visit Frequency: 1 times Per Week for 12 Weeks.       This plan was discussed with Patient.   Discussion participants: Agreed Upon Plan of Care             Patient's spiritual, cultural, and educational needs considered and patient agreeable to plan of care and goals.     Education  Education was done with Patient. The patient's learning style includes Demonstration. The patient Demonstrates understanding and Verbalizes understanding.                 Goals:   Active       long term goals       Pt will demonstrate independence with initial HEP to improve their performance of their Instrumental Activities of Daily Living.          Start:  03/27/25    Expected End:  05/22/25            Patient will improve their FOTO score to at least a 75% to demonstrate improvements in their ability to complete their functional activities.         Start:  03/27/25    Expected End:  05/22/25            Patient will improve their shoulder flexibility in order to improve their ability to complete their work activities.         Start:  03/27/25    Expected End:  05/22/25               short term goals       Pt will demonstrate independence with initial HEP to improve their performance of their Activities of Daily Living.          Start:  03/27/25    Expected End:  04/24/25            Patient will improve their  shoulder range of motion by 10 degrees in order to improve their ability to complete their work activities.          Start:  03/27/25    Expected End:  04/24/25            Patient will improve their manual muscle strength test for cuff to at least a 4+/5 to improve their ability to complete their work activities.         Start:  03/27/25    Expected End:  04/24/25                Germain Saul, PT, DPT

## 2025-04-01 ENCOUNTER — OFFICE VISIT (OUTPATIENT)
Dept: PRIMARY CARE CLINIC | Facility: CLINIC | Age: 55
End: 2025-04-01
Payer: COMMERCIAL

## 2025-04-01 VITALS
WEIGHT: 251.13 LBS | SYSTOLIC BLOOD PRESSURE: 108 MMHG | HEART RATE: 66 BPM | DIASTOLIC BLOOD PRESSURE: 80 MMHG | HEIGHT: 73 IN | OXYGEN SATURATION: 96 % | BODY MASS INDEX: 33.28 KG/M2

## 2025-04-01 DIAGNOSIS — J45.20 MILD INTERMITTENT ASTHMA WITHOUT COMPLICATION: ICD-10-CM

## 2025-04-01 DIAGNOSIS — S43.004A SHOULDER SEPARATION, RIGHT, INITIAL ENCOUNTER: ICD-10-CM

## 2025-04-01 DIAGNOSIS — R53.83 FATIGUE, UNSPECIFIED TYPE: ICD-10-CM

## 2025-04-01 DIAGNOSIS — E78.2 MIXED HYPERLIPIDEMIA: ICD-10-CM

## 2025-04-01 DIAGNOSIS — Z00.00 ANNUAL PHYSICAL EXAM: Primary | ICD-10-CM

## 2025-04-01 PROCEDURE — 99999 PR PBB SHADOW E&M-EST. PATIENT-LVL IV: CPT | Mod: PBBFAC,,, | Performed by: INTERNAL MEDICINE

## 2025-04-01 PROCEDURE — 3074F SYST BP LT 130 MM HG: CPT | Mod: CPTII,S$GLB,, | Performed by: INTERNAL MEDICINE

## 2025-04-01 PROCEDURE — 3079F DIAST BP 80-89 MM HG: CPT | Mod: CPTII,S$GLB,, | Performed by: INTERNAL MEDICINE

## 2025-04-01 PROCEDURE — 1159F MED LIST DOCD IN RCRD: CPT | Mod: CPTII,S$GLB,, | Performed by: INTERNAL MEDICINE

## 2025-04-01 PROCEDURE — 99396 PREV VISIT EST AGE 40-64: CPT | Mod: S$GLB,,, | Performed by: INTERNAL MEDICINE

## 2025-04-01 PROCEDURE — 3008F BODY MASS INDEX DOCD: CPT | Mod: CPTII,S$GLB,, | Performed by: INTERNAL MEDICINE

## 2025-04-01 RX ORDER — ALBUTEROL SULFATE 90 UG/1
INHALANT RESPIRATORY (INHALATION)
Qty: 18 G | Refills: 11 | Status: SHIPPED | OUTPATIENT
Start: 2025-04-01

## 2025-04-01 NOTE — PROGRESS NOTES
[facility] Note    Chief Complaint      Chief Complaint   Patient presents with    Annual Exam       History of Present Illness      History of Present Illness    CHIEF COMPLAINT:  Mr. Chery presents today for follow up of shoulder pain.    RIGHT AC JOINT SEPARATION:  He reports increased shoulder pain since starting physical therapy exercises. The discomfort is localized in the upper shoulder area and has become more irritated. He has completed one PT session with instructions for home exercises. He expresses skepticism about the effectiveness of self-administered PT and strongly opposes surgery, particularly due to requiring two separate procedures. He was working with a  once or twice weekly but stopped due to injury.    EXERCISE AND WEIGHT MANAGEMENT:  He walks 2 miles, 3-4 times per week for exercise. He reports being overweight without dietary restrictions. He previously achieved weight loss through alcohol cessation and reducing sweet food intake but discontinued these changes during vacation and has not resumed them.    CARDIOVASCULAR:  He experienced one episode of left sided chest pain while lying down lasting approximately one minute without associated symptoms. Previous CT calcium score showed 8, indicating minimal coronary artery calcification.    COGNITIVE FUNCTION:  He reports forgetfulness and brain fog, expressing concern due to family history of mother with recently diagnosed Alzheimer's disease. He uses CPAP therapy with minimal improvement in cognitive symptoms.    GASTROINTESTINAL:  He experiences intermittent, migrating abdominal pain that varies in location throughout the abdomen.    GENITOURINARY:  He reports nocturia once per night with prolonged urination time.    CURRENT MEDICATIONS:  He takes Nexium for acid reflux and uses Albuterol inhaler. He has discontinued his cholesterol medication.    FAMILY HISTORY:  Mother has recently diagnosed Alzheimer's  disease.      ROS:  Cardiovascular: +chest pain  Gastrointestinal: +abdominal pain, +heartburn  Musculoskeletal: +limb pain  Neurological: +forgetfulness  Male Genitourinary: +difficulty urinating, +post-urination dribbling, +nocturia       HLD: Controlling on lipitor.  Tries to eat low fat.  Has family hx of heart disease in father.  Asthma: previously mild intermittent.  Using proair BID which is controlling.  Seems to have accelerated since getting a new mattress.  ARTUR: Compliant on CPAP.    Diet: Prepares own food.  Works a .  Tries to limit fatty foods.   Exercise: Walks 3-4x/week 2 miles.  Previously had success with a .    Denies drinking and driving, drinking more than 4 drinks on occasion, drug use.      Flu shot discussed.  TdAP 2023.  COVID vaccine UTD.    Cscope 2021 Dr. Delatorre, polyps, 5 yr interval.       Assessment/Plan     Erich Chery is a 54 y.o.male with:    Assessment & Plan    Assessed shoulder pain, noting increased pain since starting PT exercises.  Evaluated weight management options, considering history of successful weight loss through alcohol cessation and reducing sweets.  Discussed cholesterol management and discontinued statin medication (temporarily) to reassess lipid levels to determine necessity of medication.  Considered testosterone testing due to central adiposity and potential impact on weight loss motivation.  Reviewed cancer screening protocols, including prostate antigen, colonoscopy, and new Galleri Multi Cancer Screen blood test.  Assessed urinary symptoms, noting mild enlargement of prostate but not yet requiring intervention.  Evaluated cognitive concerns, considering B-complex vitamins for mental focus improvement.  Examined abdomen for any concerning masses, finding no significant abnormalities.    ALZHEIMER'S DISEASE:  - Explained that Alzheimer's disease has no predictive genetic test currently available.  - Recommended a healthy diet and lifestyle  choices    OBESITY:  - Implement Mediterranean diet or other calorie-controlled diet program.  - Increase exercise routine to include core strengthening exercises and cardio.  - Consider reducing alcohol intake to support weight loss efforts.    ASTHMA:  - Continued Albuterol inhaler, transferred prescription to [facility].    PROSTATE DISORDERS:  - Discussed natural prostate health remedies and recommended incorporating turmeric and fish oil for reducing inflammation.    FOLLOW-UP:  - Ordered comprehensive lab panel including CBC, CMP, lipid panel, thyroid panel, PSA, and urinalysis.         1. Annual physical exam  - CBC Auto Differential; Future  - Comprehensive Metabolic Panel; Future  - Lipid Panel; Future  - TSH; Future  - PSA, Screening; Future  - Testosterone; Future  - Urinalysis; Future  - Urinalysis    2. Mixed hyperlipidemia  - Comprehensive Metabolic Panel; Future  - Lipid Panel; Future    3. Mild intermittent asthma without complication  - albuterol (PROVENTIL/VENTOLIN HFA) 90 mcg/actuation inhaler; INHALE 2 PUFFS BY MOUTH EVERY 6 HOURS AS NEEDED FOR WHEEZING  Dispense: 18 g; Refill: 11    4. Shoulder separation, right, initial encounter    5. Fatigue, unspecified type  - Testosterone; Future  - Urinalysis; Future  - Urinalysis      Chronic conditions status updated as per HPI.  Other than changes above, cont current medications and maintain follow up with specialists.  No follow-ups on file.    Future Appointments   Date Time Provider Department Center   4/8/2025  2:00 PM Germain Saul, PT, DPT [facility] OP [address]   5/7/2025  1:45 PM Aidee Jackson MD [facility] [place]           Past Medical History:  Past Medical History:   Diagnosis Date    ACL tear 3/9/2016    Asthma     Deviated septum     Genital herpes in men     Hyperlipidemia     Obesity     Tobacco use 11/28/2018       Past Surgical History:   has a past surgical history that includes Knee surgery; Esophagogastroduodenoscopy (N/A,  "7/22/2021); and Colonoscopy (N/A, 7/22/2021).    Family History:  family history includes Cancer in his paternal grandfather; Heart disease in his father; Hyperlipidemia in his father.     Social History:  Social History[1]    Medications:  Encounter Medications[2]    Allergies:  Review of patient's allergies indicates:  No Known Allergies    Health Maintenance:  Immunization History   Administered Date(s) Administered    COVID-19, MRNA, LN-S, PF (Pfizer) (Purple Cap) 03/20/2021, 04/12/2021, 02/07/2022    COVID-19, vector-nr, rS-Ad26, PF ([facility] [place]) 03/31/2021    Hepatitis A / Hepatitis B 09/24/2018    Influenza - Quadrivalent - PF *Preferred* (6 months and older) 10/16/2020, 10/21/2021, 09/26/2023    Influenza - Trivalent - Afluria, Fluzone MDV 09/24/2018    Influenza Split 09/24/2018    Td (ADULT) 11/04/2023    Tdap 03/02/2018      Health Maintenance   Topic Date Due    Pneumococcal Vaccines (Age 50+) (1 of 2 - PCV) Never done    Shingles Vaccine (1 of 2) Never done    Influenza Vaccine (1) 09/01/2024    COVID-19 Vaccine (5 - 2024-25 season) 09/01/2024    Hemoglobin A1c (Diabetic Prevention Screening)  04/06/2025    Colorectal Cancer Screening  07/22/2026    Lipid Panel  05/21/2029    TETANUS VACCINE  11/04/2033    RSV Vaccine (Age 60+ and Pregnant patients) (1 - 1-dose 75+ series) 10/26/2046    Hepatitis C Screening  Completed    HIV Screening  Completed        Physical Exam      Vital Signs  Pulse: 66  SpO2: 96 %  BP: 108/80  BP Location: Right arm  Patient Position: Sitting  Pain Score: 0-No pain  Height and Weight  Height: 6' 1" (185.4 cm)  Weight: 113.9 kg (251 lb 1.7 oz)  BSA (Calculated - sq m): 2.42 sq meters  BMI (Calculated): 33.1  Weight in (lb) to have BMI = 25: 189.1]    Physical Exam    General: No acute distress. Well-developed. Well-nourished.  Eyes: EOMI. Sclerae anicteric.  HENT: Normocephalic. Atraumatic. Nares patent. Moist oral mucosa.  Ears: Bilateral TMs clear. Bilateral EACs " clear.  Cardiovascular: Regular rate. Regular rhythm. No murmurs. No rubs. No gallops. Normal S1, S2.  Respiratory: Normal respiratory effort. Clear to auscultation bilaterally. No rales. No rhonchi. No wheezing. Low coarse lung sounds. Slightly tight lung sounds.  Abdomen: Soft. Non-tender. Non-distended. Normoactive bowel sounds.  Musculoskeletal: No  obvious deformity.  Extremities: No lower extremity edema.  Neurological: Alert & oriented x3. No slurred speech. Normal gait.  Psychiatric: Normal mood. Normal affect. Good insight. Good judgment.  Skin: Warm. Dry. No rash.         Physical Exam    Laboratory:    Results              CBC:  Recent Labs   Lab 04/06/22  0829 01/17/23  0826 05/21/24  1009   WBC 5.14 5.58 6.16   RBC 5.13 4.72 5.10   Hemoglobin 16.3 14.6 15.8   Hematocrit 47.7 45.4 46.5   Platelets 284 306 288   MCV 93 96 91   MCH 31.8 H 30.9 31.0   MCHC 34.2 32.2 34.0     CMP:  Recent Labs   Lab 01/17/23  0826 08/28/23  0939 05/21/24  1004   Glucose 94 92 99   Calcium 10.2 9.5 9.6   Albumin 4.0 4.1 4.3   Total Protein 7.1 6.8 7.4   Sodium 140 139 143   Potassium 4.8 4.7 5.1   CO2 26 23 25   Chloride 104 107 108   BUN 18 18 15   Alkaline Phosphatase 55 53 L 61   ALT 28 44 37   AST 18 28 30   Total Bilirubin 0.5 0.4 0.6     URINALYSIS:       LIPIDS:  Recent Labs   Lab 01/17/23  0826 08/28/23  0939 05/21/24  1004   TSH 2.196  --  1.014   HDL 33 L 31 L 37 L   Cholesterol 197 184 199   Triglycerides 246 H 204 H 154 H   LDL Cholesterol 114.8 112.2 131.2   HDL/Cholesterol Ratio 16.8 L 16.8 L 18.6 L   Non-HDL Cholesterol 164 153 162   Total Cholesterol/HDL Ratio 6.0 H 5.9 H 5.4 H     TSH:  Recent Labs   Lab 01/17/23  0826 05/21/24  1004   TSH 2.196 1.014     A1C:  Recent Labs   Lab 04/06/22  0829   Hemoglobin A1C 5.2           This note was generated with the assistance of ambient listening technology. Verbal consent was obtained by the patient and accompanying visitor(s) for the recording of patient appointment  to facilitate this note. I attest to having reviewed and edited the generated note for accuracy, though some syntax or spelling errors may persist. Please contact the author of this note for any clarification.      Alexis Redman MD  [facility]                       [1]   Social History  Tobacco Use    Smoking status: Light Smoker     Current packs/day: 0.00     Average packs/day: 0.2 packs/day for 20.0 years (3.0 ttl pk-yrs)     Types: Cigars, Cigarettes     Start date: 1999     Last attempt to quit: 2019     Years since quittin.7    Smokeless tobacco: Former    Tobacco comments:     states he smokes approximately 10 cigarettes per week   Substance Use Topics    Alcohol use: Yes     Alcohol/week: 14.0 standard drinks of alcohol     Types: 14 Shots of liquor per week    Drug use: Yes     Types: Marijuana   [2]   Outpatient Encounter Medications as of 2025   Medication Sig Dispense Refill    albuterol (PROVENTIL/VENTOLIN HFA) 90 mcg/actuation inhaler INHALE 2 PUFFS BY MOUTH EVERY 6 HOURS AS NEEDED FOR WHEEZING 18 g 11    atorvastatin (LIPITOR) 40 MG tablet Take 1 tablet (40 mg total) by mouth once daily. (Patient not taking: Reported on 3/24/2025) 90 tablet 3    azelastine (ASTELIN) 137 mcg (0.1 %) nasal spray 1 spray (137 mcg total) by Nasal route 2 (two) times daily. 30 mL 3    finasteride (PROPECIA) 1 mg tablet Take 1 tablet (1 mg total) by mouth once daily. (Patient not taking: Reported on 3/24/2025) 30 tablet 3    fluticasone-salmeterol diskus inhaler 100-50 mcg Inhale 1 puff into the lungs 2 (two) times a day. Controller (Patient not taking: Reported on 3/24/2025) 60 each 11    meloxicam (MOBIC) 15 MG tablet Take 1 tablet (15 mg total) by mouth once daily. (Patient not taking: Reported on 3/24/2025) 30 tablet 0    sildenafiL (VIAGRA) 100 MG tablet Take 1 tablet (100 mg total) by mouth daily as needed for Erectile Dysfunction. (Patient not taking: Reported on 3/24/2025) 30 tablet 3     tobramycin sulfate 0.3% (TOBREX) 0.3 % ophthalmic solution 1-2 drops topically twice daily to affected toe(s). (Patient not taking: Reported on 3/24/2025) 5 mL 3    valACYclovir (VALTREX) 1000 MG tablet Take 1 tablet (1,000 mg total) by mouth once daily. (Patient not taking: Reported on 3/24/2025) 30 tablet 3    [DISCONTINUED] albuterol (PROVENTIL/VENTOLIN HFA) 90 mcg/actuation inhaler INHALE 2 PUFFS BY MOUTH EVERY 6 HOURS AS NEEDED FOR WHEEZING 8.5 g 11     No facility-administered encounter medications on file as of 4/1/2025.

## 2025-04-02 ENCOUNTER — RESULTS FOLLOW-UP (OUTPATIENT)
Dept: PRIMARY CARE CLINIC | Facility: CLINIC | Age: 55
End: 2025-04-02

## 2025-04-02 ENCOUNTER — LAB VISIT (OUTPATIENT)
Dept: LAB | Facility: OTHER | Age: 55
End: 2025-04-02
Attending: INTERNAL MEDICINE
Payer: COMMERCIAL

## 2025-04-02 DIAGNOSIS — R53.83 FATIGUE, UNSPECIFIED TYPE: ICD-10-CM

## 2025-04-02 DIAGNOSIS — Z00.00 ANNUAL PHYSICAL EXAM: ICD-10-CM

## 2025-04-02 DIAGNOSIS — E78.2 MIXED HYPERLIPIDEMIA: ICD-10-CM

## 2025-04-02 LAB
ABSOLUTE EOSINOPHIL (OHS): 0.43 K/UL
ABSOLUTE MONOCYTE (OHS): 0.65 K/UL (ref 0.3–1)
ABSOLUTE NEUTROPHIL COUNT (OHS): 2.18 K/UL (ref 1.8–7.7)
ALBUMIN SERPL BCP-MCNC: 4 G/DL (ref 3.5–5.2)
ALP SERPL-CCNC: 61 UNIT/L (ref 40–150)
ALT SERPL W/O P-5'-P-CCNC: 31 UNIT/L (ref 10–44)
ANION GAP (OHS): 8 MMOL/L (ref 8–16)
AST SERPL-CCNC: 21 UNIT/L (ref 11–45)
BASOPHILS # BLD AUTO: 0.07 K/UL
BASOPHILS NFR BLD AUTO: 1.3 %
BILIRUB SERPL-MCNC: 0.4 MG/DL (ref 0.1–1)
BUN SERPL-MCNC: 16 MG/DL (ref 6–20)
CALCIUM SERPL-MCNC: 9.5 MG/DL (ref 8.7–10.5)
CHLORIDE SERPL-SCNC: 109 MMOL/L (ref 95–110)
CHOLEST SERPL-MCNC: 262 MG/DL (ref 120–199)
CHOLEST/HDLC SERPL: 7.7 {RATIO} (ref 2–5)
CO2 SERPL-SCNC: 24 MMOL/L (ref 23–29)
CREAT SERPL-MCNC: 1.1 MG/DL (ref 0.5–1.4)
ERYTHROCYTE [DISTWIDTH] IN BLOOD BY AUTOMATED COUNT: 12.3 % (ref 11.5–14.5)
GFR SERPLBLD CREATININE-BSD FMLA CKD-EPI: >60 ML/MIN/1.73/M2
GLUCOSE SERPL-MCNC: 100 MG/DL (ref 70–110)
HCT VFR BLD AUTO: 46.1 % (ref 40–54)
HDLC SERPL-MCNC: 34 MG/DL (ref 40–75)
HDLC SERPL: 13 % (ref 20–50)
HGB BLD-MCNC: 15.6 GM/DL (ref 14–18)
IMM GRANULOCYTES # BLD AUTO: 0.04 K/UL (ref 0–0.04)
IMM GRANULOCYTES NFR BLD AUTO: 0.7 % (ref 0–0.5)
LDLC SERPL CALC-MCNC: 174 MG/DL (ref 63–159)
LYMPHOCYTES # BLD AUTO: 2.05 K/UL (ref 1–4.8)
MCH RBC QN AUTO: 31 PG (ref 27–31)
MCHC RBC AUTO-ENTMCNC: 33.8 G/DL (ref 32–36)
MCV RBC AUTO: 92 FL (ref 82–98)
NONHDLC SERPL-MCNC: 228 MG/DL
NUCLEATED RBC (/100WBC) (OHS): 0 /100 WBC
PLATELET # BLD AUTO: 289 K/UL (ref 150–450)
PMV BLD AUTO: 9.3 FL (ref 9.2–12.9)
POTASSIUM SERPL-SCNC: 5.2 MMOL/L (ref 3.5–5.1)
PROT SERPL-MCNC: 7.7 GM/DL (ref 6–8.4)
PSA SERPL-MCNC: 1.58 NG/ML
RBC # BLD AUTO: 5.04 M/UL (ref 4.6–6.2)
RELATIVE EOSINOPHIL (OHS): 7.9 %
RELATIVE LYMPHOCYTE (OHS): 37.8 % (ref 18–48)
RELATIVE MONOCYTE (OHS): 12 % (ref 4–15)
RELATIVE NEUTROPHIL (OHS): 40.3 % (ref 38–73)
SODIUM SERPL-SCNC: 141 MMOL/L (ref 136–145)
T4 FREE SERPL-MCNC: NORMAL NG/DL
TESTOST SERPL-MCNC: 442 NG/DL (ref 304–1227)
TRIGL SERPL-MCNC: 270 MG/DL (ref 30–150)
TSH SERPL-ACNC: 1.21 UIU/ML (ref 0.4–4)
WBC # BLD AUTO: 5.42 K/UL (ref 3.9–12.7)

## 2025-04-02 PROCEDURE — 84403 ASSAY OF TOTAL TESTOSTERONE: CPT

## 2025-04-02 PROCEDURE — 36415 COLL VENOUS BLD VENIPUNCTURE: CPT

## 2025-04-02 PROCEDURE — 84153 ASSAY OF PSA TOTAL: CPT

## 2025-04-02 PROCEDURE — 84443 ASSAY THYROID STIM HORMONE: CPT

## 2025-04-02 PROCEDURE — 85025 COMPLETE CBC W/AUTO DIFF WBC: CPT

## 2025-04-02 PROCEDURE — 84460 ALANINE AMINO (ALT) (SGPT): CPT

## 2025-04-02 PROCEDURE — 80061 LIPID PANEL: CPT

## 2025-04-10 ENCOUNTER — CLINICAL SUPPORT (OUTPATIENT)
Dept: REHABILITATION | Facility: HOSPITAL | Age: 55
End: 2025-04-10
Attending: ORTHOPAEDIC SURGERY
Payer: COMMERCIAL

## 2025-04-10 DIAGNOSIS — M25.511 ACUTE PAIN OF RIGHT SHOULDER: Primary | ICD-10-CM

## 2025-04-10 PROCEDURE — 97140 MANUAL THERAPY 1/> REGIONS: CPT | Performed by: PHYSICAL THERAPIST

## 2025-04-10 PROCEDURE — 97112 NEUROMUSCULAR REEDUCATION: CPT | Performed by: PHYSICAL THERAPIST

## 2025-04-10 NOTE — PROGRESS NOTES
"Physical Therapy Visit    Patient Name: Erich Chery  MRN: [id number]  YOB: 1970  Encounter Date: 4/10/2025    Therapy Diagnosis:   Encounter Diagnosis   Name Primary?    Acute pain of right shoulder Yes     Physician: Aidee Jackson MD    Physician Orders: Eval and Treat  Medical Diagnosis: Shoulder separation, right, initial encounter    Visit # / Visits Authorized:  1 / 20  Insurance Authorization Period: 4/8/2025 to 12/31/2025  Date of Evaluation: 3/27/2025  Plan of Care Certification: 3/27/2025 to 9/27/2025      PT/PTA:     Number of PTA visits since last PT visit:   Time In: 1403   Time Out: 1500  Total Time: 57   Total Billable Time: 57    FOTO:  Intake Score:  %  Survey Score 1:  %  Survey Score 2:  %         Subjective   Was able to do all but 2 exercises. He notes leaving for a trip to [place] and will be snorkeling..  Pain reported as 4/10.      Objective            Treatment:  Manual Therapy  MT 1: AC joint mobs  MT 2: Inferior mob Gr III  MT 3: Posterior capsule stretch  MT 4: Cross body MET  Balance/Neuromuscular Re-Education  NMR 1: Side lying ER 2# 2 x 10  NMR 2: Prone extension 4# 2 x 15  NMR 3: Prone row 6# 2 x 15  NMR 4: IR/ER with lift to 90 GTB 2 x 10  NMR 5: Landmine press 25# 3 x 15  NMR 6: Bruggers 30" 3x  NMR 7: Serratus HHR 2 x 10    Time Entry(in minutes):  Manual Therapy Time Entry: 10  Neuromuscular Re-Education Time Entry: 47    Assessment & Plan   Assessment: Notes pain at first with IR and lift but improved with reps. Progressing with cuff stabilization       Patient will continue to benefit from skilled outpatient physical therapy to address the deficits listed in the problem list box on initial evaluation, provide pt/family education and to maximize pt's level of independence in the home and community environment.     Patient's spiritual, cultural, and educational needs considered and patient agreeable to plan of care and goals.           Plan: Improve cuff and " upward rotator strength    Goals:   Active       long term goals       Pt will demonstrate independence with initial HEP to improve their performance of their Instrumental Activities of Daily Living.          Start:  03/27/25    Expected End:  05/22/25            Patient will improve their FOTO score to at least a 75% to demonstrate improvements in their ability to complete their functional activities.         Start:  03/27/25    Expected End:  05/22/25            Patient will improve their shoulder flexibility in order to improve their ability to complete their work activities.         Start:  03/27/25    Expected End:  05/22/25               short term goals       Pt will demonstrate independence with initial HEP to improve their performance of their Activities of Daily Living.          Start:  03/27/25    Expected End:  04/24/25            Patient will improve their shoulder range of motion by 10 degrees in order to improve their ability to complete their work activities.          Start:  03/27/25    Expected End:  04/24/25            Patient will improve their manual muscle strength test for cuff to at least a 4+/5 to improve their ability to complete their work activities.         Start:  03/27/25    Expected End:  04/24/25                Germain Saul, PT, DPT

## 2025-05-01 ENCOUNTER — CLINICAL SUPPORT (OUTPATIENT)
Dept: REHABILITATION | Facility: HOSPITAL | Age: 55
End: 2025-05-01
Payer: COMMERCIAL

## 2025-05-01 DIAGNOSIS — M25.511 ACUTE PAIN OF RIGHT SHOULDER: Primary | ICD-10-CM

## 2025-05-01 PROCEDURE — 97112 NEUROMUSCULAR REEDUCATION: CPT | Performed by: PHYSICAL THERAPIST

## 2025-05-01 PROCEDURE — 97140 MANUAL THERAPY 1/> REGIONS: CPT | Performed by: PHYSICAL THERAPIST

## 2025-05-01 NOTE — PROGRESS NOTES
Physical Therapy Visit    Patient Name: Erich Chery  MRN: [id number]  YOB: 1970  Encounter Date: 5/1/2025    Therapy Diagnosis:   Encounter Diagnosis   Name Primary?    Acute pain of right shoulder Yes     Physician: Aidee Jackson MD    Physician Orders: Eval and Treat  Medical Diagnosis: Shoulder separation, right, initial encounter    Visit # / Visits Authorized:  2 / 20  Insurance Authorization Period: 4/8/2025 to 12/31/2025  Date of Evaluation: 3/27/2025  Plan of Care Certification: 3/27/2025 to 9/27/2025      PT/PTA:     Number of PTA visits since last PT visit:   Time In: 1505   Time Out: 1600  Total Time: 55   Total Billable Time: 55    FOTO:  Intake Score:  %  Survey Score 1:  %  Survey Score 2:  %         Subjective   Much better after last session, did well with shoulder on his trip. Hoping he avoided surgery.  Pain reported as 2/10.      Objective            Treatment:  Manual Therapy  MT 1: AC joint mobs  MT 2: Inferior mob Gr III  MT 3: Posterior capsule stretch  MT 4: Cross body MET  Balance/Neuromuscular Re-Education  NMR 1: Side lying ER 2# 2 x 10  NMR 2: Prone extension 6# 2 x 15  NMR 3: Serratus Wall Slide YTL 3 x 10  NMR 4: Genie ER/IR GTB 2 x 15  NMR 5: 90/90 Walkouts 2 x 15  NMR 6: 90/90 Walk backs 2 x 15  NMR 7: Prone Y 2# 2 x 15    Time Entry(in minutes):  Manual Therapy Time Entry: 10  Neuromuscular Re-Education Time Entry: 45    Assessment & Plan   Assessment: Progressed to overhead cuff strengthening today. Seems to be progressing very well to avoid surgery. Will see how his shoulder responds to overhead strengthening  Evaluation/Treatment Tolerance: Patient tolerated treatment well    Patient will continue to benefit from skilled outpatient physical therapy to address the deficits listed in the problem list box on initial evaluation, provide pt/family education and to maximize pt's level of independence in the home and community environment.     Patient's spiritual,  cultural, and educational needs considered and patient agreeable to plan of care and goals.           Plan: Improve cuff and upward rotator strength    Goals:   Active       long term goals       Pt will demonstrate independence with initial HEP to improve their performance of their Instrumental Activities of Daily Living.          Start:  03/27/25    Expected End:  05/22/25            Patient will improve their FOTO score to at least a 75% to demonstrate improvements in their ability to complete their functional activities.         Start:  03/27/25    Expected End:  05/22/25            Patient will improve their shoulder flexibility in order to improve their ability to complete their work activities.         Start:  03/27/25    Expected End:  05/22/25               short term goals       Pt will demonstrate independence with initial HEP to improve their performance of their Activities of Daily Living.          Start:  03/27/25    Expected End:  04/24/25            Patient will improve their shoulder range of motion by 10 degrees in order to improve their ability to complete their work activities.          Start:  03/27/25    Expected End:  04/24/25            Patient will improve their manual muscle strength test for cuff to at least a 4+/5 to improve their ability to complete their work activities.         Start:  03/27/25    Expected End:  04/24/25                Germain Saul, PT, DPT

## 2025-05-07 ENCOUNTER — OFFICE VISIT (OUTPATIENT)
Dept: SPORTS MEDICINE | Facility: CLINIC | Age: 55
End: 2025-05-07
Payer: COMMERCIAL

## 2025-05-07 VITALS
HEART RATE: 78 BPM | WEIGHT: 251.81 LBS | DIASTOLIC BLOOD PRESSURE: 78 MMHG | HEIGHT: 73 IN | BODY MASS INDEX: 33.37 KG/M2 | SYSTOLIC BLOOD PRESSURE: 124 MMHG

## 2025-05-07 DIAGNOSIS — S43.004A SHOULDER SEPARATION, RIGHT, INITIAL ENCOUNTER: Primary | ICD-10-CM

## 2025-05-07 PROCEDURE — 99214 OFFICE O/P EST MOD 30 MIN: CPT | Mod: S$GLB,,, | Performed by: ORTHOPAEDIC SURGERY

## 2025-05-07 PROCEDURE — 3074F SYST BP LT 130 MM HG: CPT | Mod: CPTII,S$GLB,, | Performed by: ORTHOPAEDIC SURGERY

## 2025-05-07 PROCEDURE — 99999 PR PBB SHADOW E&M-EST. PATIENT-LVL III: CPT | Mod: PBBFAC,,, | Performed by: ORTHOPAEDIC SURGERY

## 2025-05-07 PROCEDURE — 1159F MED LIST DOCD IN RCRD: CPT | Mod: CPTII,S$GLB,, | Performed by: ORTHOPAEDIC SURGERY

## 2025-05-07 PROCEDURE — 3008F BODY MASS INDEX DOCD: CPT | Mod: CPTII,S$GLB,, | Performed by: ORTHOPAEDIC SURGERY

## 2025-05-07 PROCEDURE — 3078F DIAST BP <80 MM HG: CPT | Mod: CPTII,S$GLB,, | Performed by: ORTHOPAEDIC SURGERY

## 2025-05-07 NOTE — PROGRESS NOTES
CC: right shoulder pain     54 y.o. Male  at [facility] reports he had a right shoulder injury 3 months while skiing. He pain is subsiding gradually. It also bothers him at night. He is continuing with his PT.    Is affecting ADLs.     SANE: 70  VAS 2/10    Review of Systems   Constitution: Negative. Negative for chills, fever and night sweats.   HENT: Negative for congestion and headaches.    Eyes: Negative for blurred vision, left vision loss and right vision loss.   Cardiovascular: Negative for chest pain and syncope.   Respiratory: Negative for cough and shortness of breath.    Endocrine: Negative for polydipsia, polyphagia and polyuria.   Hematologic/Lymphatic: Negative for bleeding problem. Does not bruise/bleed easily.   Skin: Negative for dry skin, itching and rash.   Musculoskeletal: Negative for falls and muscle weakness.   Gastrointestinal: Negative for abdominal pain and bowel incontinence.   Genitourinary: Negative for bladder incontinence and nocturia.   Neurological: Negative for disturbances in coordination, loss of balance and seizures.   Psychiatric/Behavioral: Negative for depression. The patient does not have insomnia.    Allergic/Immunologic: Negative for hives and persistent infections.     PAST MEDICAL HISTORY:   Past Medical History:   Diagnosis Date    ACL tear 3/9/2016    Asthma     Deviated septum     Genital herpes in men     Hyperlipidemia     Obesity     Tobacco use 11/28/2018     PAST SURGICAL HISTORY:   Past Surgical History:   Procedure Laterality Date    COLONOSCOPY N/A 7/22/2021    Procedure: COLONOSCOPY;  Surgeon: Leo Delatorre MD;  Location: [address]);  Service: Endoscopy;  Laterality: N/A;  1st colonoscopy - screening  pt fully vaccinated-BB    ESOPHAGOGASTRODUODENOSCOPY N/A 7/22/2021    Procedure: EGD (ESOPHAGOGASTRODUODENOSCOPY);  Surgeon: Leo Delatorre MD;  Location: [address]);  Service: Endoscopy;  Laterality: N/A;  dysphagia to solid foods  pt  "fully vaccinated-BB    KNEE SURGERY       FAMILY HISTORY:   Family History   Problem Relation Name Age of Onset    Hyperlipidemia Father      Heart disease Father          CABG    Cancer Paternal Grandfather          lung     SOCIAL HISTORY: Social History[1]    MEDICATIONS: Current Medications[2]  ALLERGIES: Review of patient's allergies indicates:  No Known Allergies    VITAL SIGNS: /78   Pulse 78   Ht 6' 1" (1.854 m)   Wt 114.2 kg (251 lb 12.6 oz)   BMI 33.22 kg/m²      PHYSICAL EXAMINATION:  General:  The patient is alert and oriented x 3.  Mood is pleasant.  Observation of ears, eyes and nose reveal no gross abnormalities.  No labored breathing observed.  Gait is coordinated. Patient can toe walk and heel walk without difficulty.      right SHOULDER / UPPER EXTREMITY EXAM    OBSERVATION:     Swelling  none  Deformity  Displaced distal clavicle superiorly   Discoloration  none   Scapular winging none   Scars   none  Atrophy  none    TENDERNESS / CREPITUS (T/C):          T/C      T/C   Clavicle   -/-  SUPRAspinatus    -/-     AC Jt.    -/-  INFRAspinatus  -/-    SC Jt.    -/-  Deltoid    -/-      G. Tuberosity  -/-  LH BICEP groove  -/-   Acromion:  -/-  Midline Neck   -/-     Scapular Spine -/-  Trapezium   -/-   SMA Scapula  -/-  GH jt. line - post  -/-     Scapulothoracic  -/-         ROM: (* = with pain)  Right shoulder   Left shoulder        AROM (PROM)   AROM (PROM)   FE    160 (165)                170° (175°)     ER at 0°    50 (60)               60°  (65°)   ER at 90° ABD  80 (90)                90°  (90°)   IR at 90°  ABD   NA (40)                      NA  (40°)       STRENGTH: (* = with pain)   Scaption 0                   5/5  Scaption 30                 5/5  ER                               5/5  IR                                5/5           EXTREMITY NEURO-VASCULAR EXAM    Sensation grossly intact to light touch all dermatomal regions.    DTR 2+ Biceps, Triceps, BR and Negative " Lindsays sign   Grossly intact motor function at Elbow, Wrist and Hand   Distal pulses radial and ulnar 2+, brisk cap refill, symmetric.      NECK:  Painless FROM and spinous processes non-tender. Negative Spurlings sign.      OTHER FINDINGS:  none      ASSESSMENT:   right AC joint separation, labral tear    PLAN:    Continue PT  RTC in 12 weeks    All questions were answered, pt will contact us for questions or concerns in the interim.    I made the decision to obtain old records of the patient including previous notes and imaging. New imaging was ordered today of the extremity or extremities evaluated. I independently reviewed and interpreted the radiographs and/or MRIs today as well as prior imaging.             [1]   Social History  Socioeconomic History    Marital status: Single   Occupational History    Occupation: /Restaurant Owner     Comment: [employer]   Tobacco Use    Smoking status: Light Smoker     Current packs/day: 0.00     Average packs/day: 0.2 packs/day for 20.0 years (3.0 ttl pk-yrs)     Types: Cigars, Cigarettes     Start date: 1999     Last attempt to quit: 2019     Years since quittin.8    Smokeless tobacco: Former    Tobacco comments:     states he smokes approximately 10 cigarettes per week   Substance and Sexual Activity    Alcohol use: Yes     Alcohol/week: 14.0 standard drinks of alcohol     Types: 14 Shots of liquor per week    Drug use: Yes     Types: Marijuana    Sexual activity: Yes     Partners: Female     Comment: Does not use barrier protection; does not know if his partner  uses any contraception   [2]   Current Outpatient Medications:     albuterol (PROVENTIL/VENTOLIN HFA) 90 mcg/actuation inhaler, INHALE 2 PUFFS BY MOUTH EVERY 6 HOURS AS NEEDED FOR WHEEZING, Disp: 18 g, Rfl: 11    azelastine (ASTELIN) 137 mcg (0.1 %) nasal spray, 1 spray (137 mcg total) by Nasal route 2 (two) times daily., Disp: 30 mL, Rfl: 3    atorvastatin (LIPITOR) 40 MG tablet,  Take 1 tablet (40 mg total) by mouth once daily. (Patient not taking: Reported on 5/7/2025), Disp: 90 tablet, Rfl: 3    finasteride (PROPECIA) 1 mg tablet, Take 1 tablet (1 mg total) by mouth once daily. (Patient not taking: Reported on 5/7/2025), Disp: 30 tablet, Rfl: 3    fluticasone-salmeterol diskus inhaler 100-50 mcg, Inhale 1 puff into the lungs 2 (two) times a day. Controller (Patient not taking: Reported on 5/7/2025), Disp: 60 each, Rfl: 11    meloxicam (MOBIC) 15 MG tablet, Take 1 tablet (15 mg total) by mouth once daily. (Patient not taking: Reported on 5/7/2025), Disp: 30 tablet, Rfl: 0    sildenafiL (VIAGRA) 100 MG tablet, Take 1 tablet (100 mg total) by mouth daily as needed for Erectile Dysfunction. (Patient not taking: Reported on 5/7/2025), Disp: 30 tablet, Rfl: 3    tobramycin sulfate 0.3% (TOBREX) 0.3 % ophthalmic solution, 1-2 drops topically twice daily to affected toe(s). (Patient not taking: Reported on 5/7/2025), Disp: 5 mL, Rfl: 3    valACYclovir (VALTREX) 1000 MG tablet, Take 1 tablet (1,000 mg total) by mouth once daily. (Patient not taking: Reported on 5/7/2025), Disp: 30 tablet, Rfl: 3

## 2025-05-12 ENCOUNTER — CLINICAL SUPPORT (OUTPATIENT)
Dept: REHABILITATION | Facility: HOSPITAL | Age: 55
End: 2025-05-12
Payer: COMMERCIAL

## 2025-05-12 DIAGNOSIS — M25.511 ACUTE PAIN OF RIGHT SHOULDER: Primary | ICD-10-CM

## 2025-05-12 PROCEDURE — 97112 NEUROMUSCULAR REEDUCATION: CPT | Performed by: PHYSICAL THERAPIST

## 2025-05-12 PROCEDURE — 97140 MANUAL THERAPY 1/> REGIONS: CPT | Performed by: PHYSICAL THERAPIST

## 2025-05-12 NOTE — PROGRESS NOTES
Outpatient Rehab    Physical Therapy Progress Note    Patient Name: Erich Chery  MRN: [id number]  YOB: 1970  Encounter Date: 5/12/2025    Therapy Diagnosis:   Encounter Diagnosis   Name Primary?    Acute pain of right shoulder Yes     Physician: Aidee Jackson MD    Physician Orders: Eval and Treat  Medical Diagnosis: Shoulder separation, right, initial encounter    Visit # / Visits Authorized:  3 / 20  Insurance Authorization Period: 4/8/2025 to 12/31/2025  Date of Evaluation: 3/24/2025  Plan of Care Certification: 3/27/2025 to 9/27/2025      PT/PTA:     Number of PTA visits since last PT visit:   Time In: 1100   Time Out: 1200  Total Time (in minutes): 60   Total Billable Time (in minutes): 60    FOTO:  Intake Score:  %  Survey Score 2:  %  Survey Score 3:  %    Precautions:       Subjective   Noted he overdid it a little working with BTB genie ER and did some exercising with his , but avoided the shoulder.  Pain reported as 2/10.      Objective            Treatment:  Manual Therapy  MT 1: AC joint mobs  MT 2: Inferior mob Gr III  MT 3: Posterior capsule stretch  MT 4: Cross body MET  Balance/Neuromuscular Re-Education  NMR 1: Side lying ER 2# 2 x 10  NMR 2: Scaption iso holds 3# 3 x 6  NMR 3: Prone Row with ER 3# 3 x 12  NMR 4: Genie ER/IR GTB 2 x 15  NMR 5: Landmine D2 ext 10# 3 x 12  NMR 6: IR CC- unable perform due to clicking  NMR 7: [place]mien 45# 3 x 8    Time Entry(in minutes):  Manual Therapy Time Entry: 15  Neuromuscular Re-Education Time Entry: 45    Assessment & Plan   Assessment: Noted he was unable to perform IR with weighted column due to clinic. He was reduced down to GTB at 90 deg genie after pain in shoulder using blue at home. Edcuated on serratus loading as tolerated 5 exercises to perform for the week  Evaluation/Treatment Tolerance: Patient tolerated treatment well    Patient will continue to benefit from skilled outpatient physical therapy to address the deficits  listed in the problem list box on initial evaluation, provide pt/family education and to maximize pt's level of independence in the home and community environment.     Patient's spiritual, cultural, and educational needs considered and patient agreeable to plan of care and goals.           Plan: Improve cuff and upward rotator strength    Goals:   Active       long term goals       Pt will demonstrate independence with initial HEP to improve their performance of their Instrumental Activities of Daily Living.          Start:  03/27/25    Expected End:  05/22/25            Patient will improve their FOTO score to at least a 75% to demonstrate improvements in their ability to complete their functional activities.         Start:  03/27/25    Expected End:  05/22/25            Patient will improve their shoulder flexibility in order to improve their ability to complete their work activities.         Start:  03/27/25    Expected End:  05/22/25               short term goals       Pt will demonstrate independence with initial HEP to improve their performance of their Activities of Daily Living.          Start:  03/27/25    Expected End:  04/24/25            Patient will improve their shoulder range of motion by 10 degrees in order to improve their ability to complete their work activities.          Start:  03/27/25    Expected End:  04/24/25            Patient will improve their manual muscle strength test for cuff to at least a 4+/5 to improve their ability to complete their work activities.         Start:  03/27/25    Expected End:  04/24/25                Germain Saul, PT, DPT

## 2025-06-12 ENCOUNTER — CLINICAL SUPPORT (OUTPATIENT)
Dept: REHABILITATION | Facility: HOSPITAL | Age: 55
End: 2025-06-12
Payer: COMMERCIAL

## 2025-06-12 DIAGNOSIS — M25.511 ACUTE PAIN OF RIGHT SHOULDER: Primary | ICD-10-CM

## 2025-06-12 PROCEDURE — 97140 MANUAL THERAPY 1/> REGIONS: CPT | Performed by: PHYSICAL THERAPIST

## 2025-06-12 PROCEDURE — 97112 NEUROMUSCULAR REEDUCATION: CPT | Performed by: PHYSICAL THERAPIST

## 2025-06-12 NOTE — PROGRESS NOTES
Outpatient Rehab    Physical Therapy Progress Note    Patient Name: Erich Chery  MRN: [id number]  YOB: 1970  Encounter Date: 6/12/2025    Therapy Diagnosis:   Encounter Diagnosis   Name Primary?    Acute pain of right shoulder Yes     Physician: Aidee Jackson MD    Physician Orders: Eval and Treat  Medical Diagnosis: Shoulder separation, right, initial encounter  Surgical Diagnosis: Not applicable for this Episode   Surgical Date: Not applicable for this Episode    Visit # / Visits Authorized:  4 / 20  Insurance Authorization Period: 4/8/2025 to 12/31/2025  Date of Evaluation: 3/24/2025  Plan of Care Certification: 3/27/2025 to 9/27/2025      PT/PTA:     Number of PTA visits since last PT visit:   Time In: 1511   Time Out: 1600  Total Time (in minutes): 49   Total Billable Time (in minutes): 49    FOTO:  Intake Score:  %  Survey Score 2:  %  Survey Score 3:  %    Precautions:       Subjective   Shoulder is doing well , went to the gym and it wasnt as painful. He reports still pain reaching behind his back but not as bad.  Pain reported as 1/10.      Objective      Shoulder Range of Motion  Right Shoulder   Active (deg) Passive (deg) Pain   Flexion 170 175     Extension         Scaption         ABduction         ADduction         Horizontal ABduction         Horizontal ADduction         External Rotation (Shoulder ABducted 0 degrees) 45       External Rotation (Shoulder ABducted 45 degrees)         External Rotation (Shoulder ABducted 90 degrees) 90       Internal Rotation (Shoulder ABducted 0 degrees)         Internal Rotation (Shoulder ABducted 45 degrees) 45       Internal Rotation (Shoulder ABducted 90 degrees)                       Shoulder Strength - Planes of Motion   Right Strength Right Pain Left Strength Left  Pain   Flexion           Extension           ABduction           ADduction           Horizontal ABduction           Horizontal ADduction           Internal Rotation 0°            Internal Rotation 90°           External Rotation 0°           External Rotation 90°               Shoulder Strength - Rotator Cuff Muscles   Right Strength Right Pain Left Strength Left  Pain   Supraspinatus           Infraspinatus 4+         Teres Minor 4+         Subscapularis 4+                        Treatment:  Manual Therapy  MT 1: AC joint mobs  MT 2: Inferior mobs Gr III  MT 3: Shoulder reassessment  Balance/Neuromuscular Re-Education  NMR 1: Albanian get up 10# start 2 x 15  NMR 2: 90/90 BTB IR/ER 3 x 15  NMR 3: Serratus wall slide lift off GTB 3 x 15  NMR 4: Prone T 3# over EOT 4 x 8  NMR 5: D1/D2 extensions 10# 3 x 15      Time Entry(in minutes):  Manual Therapy Time Entry: 10  Neuromuscular Re-Education Time Entry: 39    Assessment & Plan   Assessment: Progressed to OH cuff strengthening today. Most fatigue in the overhead position with serratus and prone mid trap loading, even notes his low trap strength is limited in prone but partly due to bed setup  Evaluation/Treatment Tolerance: Patient tolerated treatment well    The patient will continue to benefit from skilled outpatient physical therapy in order to address the deficits listed in the problem list on the initial evaluation, provide patient and family education, and maximize the patients level of independence in the home and community environments.     The patient's spiritual, cultural, and educational needs were considered, and the patient is agreeable to the plan of care and goals.           Plan: Improve OH cuff strength    Goals:   Active       long term goals       Pt will demonstrate independence with initial HEP to improve their performance of their Instrumental Activities of Daily Living.          Start:  03/27/25    Expected End:  05/22/25            Patient will improve their FOTO score to at least a 75% to demonstrate improvements in their ability to complete their functional activities.         Start:  03/27/25    Expected End:   05/22/25            Patient will improve their shoulder flexibility in order to improve their ability to complete their work activities.         Start:  03/27/25    Expected End:  05/22/25               short term goals       Pt will demonstrate independence with initial HEP to improve their performance of their Activities of Daily Living.          Start:  03/27/25    Expected End:  04/24/25            Patient will improve their shoulder range of motion by 10 degrees in order to improve their ability to complete their work activities.          Start:  03/27/25    Expected End:  04/24/25            Patient will improve their manual muscle strength test for cuff to at least a 4+/5 to improve their ability to complete their work activities.         Start:  03/27/25    Expected End:  04/24/25                Germain Saul, PT, DPT

## 2025-06-19 ENCOUNTER — OFFICE VISIT (OUTPATIENT)
Dept: PULMONOLOGY | Facility: CLINIC | Age: 55
End: 2025-06-19
Payer: COMMERCIAL

## 2025-06-19 DIAGNOSIS — G47.33 OSA (OBSTRUCTIVE SLEEP APNEA): Primary | ICD-10-CM

## 2025-06-19 NOTE — PROGRESS NOTES
The patient location is: home  The chief complaint leading to consultation is: mily    Visit type: audiovisual    Face to Face time with patient:   30 minutes of total time spent on the encounter, which includes face to face time and non-face to face time preparing to see the patient (eg, review of tests), Obtaining and/or reviewing separately obtained history, Documenting clinical information in the electronic or other health record, Independently interpreting results (not separately reported) and communicating results to the patient/family/caregiver, or Care coordination (not separately reported).         Each patient to whom he or she provides medical services by telemedicine is:  (1) informed of the relationship between the physician and patient and the respective role of any other health care provider with respect to management of the patient; and (2) notified that he or she may decline to receive medical services by telemedicine and may withdraw from such care at any time.    Notes:     Erich Chery  was seen as a follow up.    CHIEF COMPLAINT:    Chief Complaint   Patient presents with    Apnea       HISTORY OF PRESENT ILLNESS: Erich Chery is a 54 y.o. male is here for sleep evaluation.      Our first encounter was 9/27/19.  At that time, patient endorsed difficulty with sleep maintenance since 2017.  No issue with sleep initiation.  However, often wake up after 2-3 hours with difficulty going back to sleep.  Symptoms worsen since 8/19.  +h/o deviated septum.  Loud snoring.  No witnessed apnea.  Fatigue upon awake.  No parasomnia.  No cataplexy.  No rls symptoms.  Patient is not interested in sleep medication.  +dry mouth upon awake.    Patient works as a .  4 nights per week from 5 pm to 12 am.      [place] Sleepiness Scale score during initial sleep evaluation was 3.    S/p hsat 10/7/19 with ahi of 19.  Patient was lost to follow up.  Per patient, he was not interested in therapy at that time.     During our visit in 2022, patient decided to get oral appliance from dentist, Carolee Cheng.  With mouth guard, snoring improved.  However, patient with persistent fatigue.    Repeat hsat 2/22/24 with ahi of 14.  Patient was set up apap.  Using apap most night.  Still feeling tire upon awake on some days.  Overall, sleep is slightly better.  Per patient, sleep is only restful when MyAir score is 100.    SLEEP ROUTINE:  Activity the hour prior to sleep: snack and watch tv show on mac    Bed partner:  alone  Time to bed:  midnight - 2 am   Lights off:  off  Sleep onset latency:  15-30 minutes        Disruptions or awakenings:    1-2 time per night (can take 15 minutes to go back to sleep)  Wakeup time:      7 am - 11 am (depending on whether or not his children is not with him)  Perceived sleep quality: better but still tire       Daytime naps:      none  Weekend sleep routine:      2-3 am to 11 am  Caffeine use: 2 mug of coffee in am   exercise habit:  lifting weights and walking    PAST MEDICAL HISTORY:    Active Ambulatory Problems     Diagnosis Date Noted    Chronic pain of left knee 09/15/2016    Hyperlipidemia 05/04/2017    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/2018    Class 1 obesity due to excess calories with serious comorbidity and body mass index (BMI) of 31.0 to 31.9 in adult 09/13/2019    ARTUR (obstructive sleep apnea) 09/27/2019    Insomnia due to medical condition 09/27/2019    Vitamin D deficiency 03/16/2021    Primary insomnia 08/21/2023    Deviated septum 05/31/2024    Acute pain of right shoulder 03/27/2025     Resolved Ambulatory Problems     Diagnosis Date Noted    ACL tear 03/09/2016    Preventative health care 05/04/2017    Aftercare for anterior cruciate ligament (ACL) repair 09/29/2017    Tobacco use 11/28/2018    Pain in left ankle 10/20/2022    Difficulty walking 10/20/2022    Right foot pain 08/25/2023    Ankle joint stiffness, bilateral 08/25/2023     Past Medical History:   Diagnosis Date    Asthma     Genital  herpes in men     Obesity                 PAST SURGICAL HISTORY:    Past Surgical History:   Procedure Laterality Date    COLONOSCOPY N/A 2021    Procedure: COLONOSCOPY;  Surgeon: Leo Delatorre MD;  Location: [facility] ([address] [place] [address]);  Service: Endoscopy;  Laterality: N/A;  1st colonoscopy - screening  pt fully vaccinated-BB    ESOPHAGOGASTRODUODENOSCOPY N/A 2021    Procedure: EGD (ESOPHAGOGASTRODUODENOSCOPY);  Surgeon: Leo Delatorre MD;  Location: [facility] ([address] [place] [address]);  Service: Endoscopy;  Laterality: N/A;  dysphagia to solid foods  pt fully vaccinated-BB    KNEE SURGERY           FAMILY HISTORY:                Family History   Problem Relation Name Age of Onset    Hyperlipidemia Father      Heart disease Father          CABG    Cancer Paternal Grandfather          lung       SOCIAL HISTORY:          Tobacco:   Social History     Tobacco Use   Smoking Status Light Smoker    Current packs/day: 0.00    Average packs/day: 0.2 packs/day for 20.0 years (3.0 ttl pk-yrs)    Types: Cigars, Cigarettes    Start date: 1999    Last attempt to quit: 2019    Years since quittin.9   Smokeless Tobacco Former   Tobacco Comments    states he smokes approximately 10 cigarettes per week       alcohol use:    Social History     Substance and Sexual Activity   Alcohol Use Yes    Alcohol/week: 14.0 standard drinks of alcohol    Types: 14 Shots of liquor per week                 Occupation:  and owner    ALLERGIES:  Review of patient's allergies indicates:  No Known Allergies    CURRENT MEDICATIONS:    Current Outpatient Medications   Medication Sig Dispense Refill    albuterol (PROVENTIL/VENTOLIN HFA) 90 mcg/actuation inhaler INHALE 2 PUFFS BY MOUTH EVERY 6 HOURS AS NEEDED FOR WHEEZING 18 g 11    atorvastatin (LIPITOR) 40 MG tablet Take 1 tablet (40 mg total) by mouth once daily. (Patient not taking: Reported on 2025) 90 tablet 3    azelastine (ASTELIN) 137 mcg (0.1 %) nasal spray 1 spray (137 mcg total)  by Nasal route 2 (two) times daily. 30 mL 3    finasteride (PROPECIA) 1 mg tablet Take 1 tablet (1 mg total) by mouth once daily. (Patient not taking: Reported on 5/7/2025) 30 tablet 3    fluticasone-salmeterol diskus inhaler 100-50 mcg Inhale 1 puff into the lungs 2 (two) times a day. Controller (Patient not taking: Reported on 5/7/2025) 60 each 11    meloxicam (MOBIC) 15 MG tablet Take 1 tablet (15 mg total) by mouth once daily. (Patient not taking: Reported on 5/7/2025) 30 tablet 0    sildenafiL (VIAGRA) 100 MG tablet Take 1 tablet (100 mg total) by mouth daily as needed for Erectile Dysfunction. (Patient not taking: Reported on 5/7/2025) 30 tablet 3    tobramycin sulfate 0.3% (TOBREX) 0.3 % ophthalmic solution 1-2 drops topically twice daily to affected toe(s). (Patient not taking: Reported on 5/7/2025) 5 mL 3    valACYclovir (VALTREX) 1000 MG tablet Take 1 tablet (1,000 mg total) by mouth once daily. (Patient not taking: Reported on 5/7/2025) 30 tablet 3     No current facility-administered medications for this visit.                  REVIEW OF SYSTEMS:     Sleep related symptoms as per HPI.  CONST:Denies weight gain    HEENT: deviated septum  PULM: Denies dyspnea; no coughing or wheezing  CARD:  Denies palpitations   GI:  +occasional acid reflux  : Denies polyuria  NEURO: Denies headaches  PSYCH: Denies mood disturbance  HEME: Denies anemia   Otherwise, a balance of systems reviewed is negative.          PHYSICAL EXAM:  There were no vitals filed for this visit.      There is no height or weight on file to calculate BMI.       GENERAL: Normal development, well groomed.  No apparent distress.    HEENT:   extra oculomotor is intact  NECK: N/A.  SKIN: On face and neck: No abrasions, no rashes, no lesions.    RESPIRATORY:   Normal chest expansion and non-labored breathing at rest.  CARDIOVASCULAR: n/a    EXTREMITIES: n/a  NEURO/PSYCH: Oriented to time, place and person. Normal attention span and concentration.  Affect is full. Mood is normal.               DATA   9/19/19 ratio 77%; fvc 102%; fev1 99%; dlco 113%  hsat 10/7/19 ahi of 19; rdi of 48  Hsat 2/28/24 ahi of 14; rdi of 31    Lab Results   Component Value Date    TSH 1.214 04/02/2025     ASSESSMENT/PLAN  Problem List Items Addressed This Visit       ARTUR (obstructive sleep apnea) - Primary    Overview   HSAT 2019 with ahi of 19 and rdi of 48  Hsat 2/28/24 with oral appliance with ahi of 14; rdi of 31  70%>4 hours.  Residual ahi of 1.6.  patient is benefiting from apap.  Main issue is excessive leakage.    May consider nasal mask and chin strap in hope of lowering leakage.    Per patient, sleep is restful when MyAir score is around 100.  Will try to improve leakage and total sleep time.  Currently averaging 5.5 hours per night.  Encourage 8 hours tst.    May benefit from a trial of provigil given residual grogginess.  Patient deferred at this time.          Relevant Orders    CPAP/BIPAP SUPPLIES         Education: During our discussion today, we talked about the etiology of obstructive sleep apnea as well as the potential ramifications of untreated sleep apnea, which could include daytime sleepiness, hypertension, heart disease and/or stroke.     Precautions: The patient was advised to abstain from driving should they feel sleepy or drowsy.       Patient will No follow-ups on file. with md/np.      30 minutes of total time spent on the encounter, which includes face to face time and non-face to face time preparing to see the patient (eg, review of tests), Obtaining and/or reviewing separately obtained history, documenting clinical information in the electronic or other health record, independently interpreting results (not separately reported) and communicating results to the patient/family/caregiver, or Care coordination (not separately reported).       Visit today included increased complexity associated with the care of the episodic problem artur addressed and  managing the longitudinal care of the patient due to the serious and/or complex managed problem(s) mily.

## 2025-07-10 ENCOUNTER — CLINICAL SUPPORT (OUTPATIENT)
Dept: REHABILITATION | Facility: HOSPITAL | Age: 55
End: 2025-07-10
Payer: COMMERCIAL

## 2025-07-10 DIAGNOSIS — M25.511 ACUTE PAIN OF RIGHT SHOULDER: Primary | ICD-10-CM

## 2025-07-10 PROCEDURE — 97112 NEUROMUSCULAR REEDUCATION: CPT | Performed by: PHYSICAL THERAPIST

## 2025-07-10 PROCEDURE — 97140 MANUAL THERAPY 1/> REGIONS: CPT | Performed by: PHYSICAL THERAPIST

## 2025-07-11 NOTE — PROGRESS NOTES
Physical Therapy Visit    Patient Name: Erich Chery  MRN: [id number]  YOB: 1970  Encounter Date: 7/10/2025    Therapy Diagnosis:   Encounter Diagnosis   Name Primary?    Acute pain of right shoulder Yes     Physician: Aidee Jackson MD    Physician Orders: Eval and Treat  Medical Diagnosis: Shoulder separation, right, initial encounter  Surgical Diagnosis: Not applicable for this Episode   Surgical Date: Not applicable for this Episode  Days Since Last Surgery: Not applicable for this Episode    Visit # / Visits Authorized:  5 / 20  Insurance Authorization Period: 4/8/2025 to 12/31/2025  Date of Evaluation: 3/24/2025  Plan of Care Certification: 3/27/2025 to 9/27/2025      PT/PTA:     Number of PTA visits since last PT visit:   Time In: 1503   Time Out: 1550  Total Time (in minutes): 47   Total Billable Time (in minutes): 47    FOTO:  Intake Score:  %  Survey Score 2:  %  Survey Score 3:  %    Precautions:       Subjective   He did not exercise much on the trip. He reports soreness and not full recovery when reaching backwards.  Pain reported as 2/10.      Objective            Treatment:  Manual Therapy  MT 1: Gr I-II oscillations pain relief and relaxation  MT 2: Gr II-IV flexion pain free range  MT 3: Gr II-IV inferior mob  MT 4: ER Gr II-IV arm by side and propped up on towel  MT 5: Centering glide AP Gr  II-III  MT 6: AC mobs  Balance/Neuromuscular Re-Education  NMR 1: Pot stir 2 x 15  NMR 2: D2 flexion 7# 3 x 15  NMR 3: Prone row with ER 3 x 12  NMR 4: Prone IR 3 x 12  NMR 5: ER with flexion rhythmic stab YTB 3 x 8  NMR 6: Open books 20x B  NMR 7: Prone Y over swiss ball 3 x 8    Time Entry(in minutes):  Manual Therapy Time Entry: 15  Neuromuscular Re-Education Time Entry: 32    Assessment & Plan   Assessment: Limited thoracic spine mobility and poor scapular mechanics. Scapula posterior tilt is limited and very week lower trap. Noted reach backwards pain improved with manual and exercises.    Evaluation/Treatment Tolerance: Patient tolerated treatment well    The patient will continue to benefit from skilled outpatient physical therapy in order to address the deficits listed in the problem list on the initial evaluation, provide patient and family education, and maximize the patients level of independence in the home and community environments.     The patient's spiritual, cultural, and educational needs were considered, and the patient is agreeable to the plan of care and goals.           Plan: Improve periscpa strength and thoracic mobility    Goals:   Active       long term goals       Pt will demonstrate independence with initial HEP to improve their performance of their Instrumental Activities of Daily Living.          Start:  03/27/25    Expected End:  05/22/25            Patient will improve their FOTO score to at least a 75% to demonstrate improvements in their ability to complete their functional activities.         Start:  03/27/25    Expected End:  05/22/25            Patient will improve their shoulder flexibility in order to improve their ability to complete their work activities.         Start:  03/27/25    Expected End:  05/22/25               short term goals       Pt will demonstrate independence with initial HEP to improve their performance of their Activities of Daily Living.          Start:  03/27/25    Expected End:  04/24/25            Patient will improve their shoulder range of motion by 10 degrees in order to improve their ability to complete their work activities.          Start:  03/27/25    Expected End:  04/24/25            Patient will improve their manual muscle strength test for cuff to at least a 4+/5 to improve their ability to complete their work activities.         Start:  03/27/25    Expected End:  04/24/25                Germain Saul, PT, DPT

## 2025-08-19 ENCOUNTER — OFFICE VISIT (OUTPATIENT)
Dept: PODIATRY | Facility: CLINIC | Age: 55
End: 2025-08-19
Payer: COMMERCIAL

## 2025-08-19 ENCOUNTER — CLINICAL SUPPORT (OUTPATIENT)
Dept: REHABILITATION | Facility: HOSPITAL | Age: 55
End: 2025-08-19
Payer: COMMERCIAL

## 2025-08-19 ENCOUNTER — APPOINTMENT (OUTPATIENT)
Dept: RADIOLOGY | Facility: OTHER | Age: 55
End: 2025-08-19
Attending: PODIATRIST
Payer: COMMERCIAL

## 2025-08-19 VITALS
HEART RATE: 62 BPM | SYSTOLIC BLOOD PRESSURE: 120 MMHG | DIASTOLIC BLOOD PRESSURE: 78 MMHG | HEIGHT: 73 IN | BODY MASS INDEX: 33.36 KG/M2 | WEIGHT: 251.75 LBS

## 2025-08-19 DIAGNOSIS — M79.671 FOOT PAIN, RIGHT: ICD-10-CM

## 2025-08-19 DIAGNOSIS — M24.573 EQUINUS CONTRACTURE OF ANKLE: ICD-10-CM

## 2025-08-19 DIAGNOSIS — M25.511 ACUTE PAIN OF RIGHT SHOULDER: Primary | ICD-10-CM

## 2025-08-19 DIAGNOSIS — M77.9 CAPSULITIS: Primary | ICD-10-CM

## 2025-08-19 DIAGNOSIS — M77.9 CAPSULITIS: ICD-10-CM

## 2025-08-19 PROCEDURE — 99999 PR PBB SHADOW E&M-EST. PATIENT-LVL III: CPT | Mod: PBBFAC,,, | Performed by: PODIATRIST

## 2025-08-19 PROCEDURE — 97112 NEUROMUSCULAR REEDUCATION: CPT | Performed by: PHYSICAL THERAPIST

## 2025-08-19 PROCEDURE — 97140 MANUAL THERAPY 1/> REGIONS: CPT | Performed by: PHYSICAL THERAPIST

## 2025-08-19 PROCEDURE — 73630 X-RAY EXAM OF FOOT: CPT | Mod: TC,PN,RT

## 2025-08-19 PROCEDURE — 73630 X-RAY EXAM OF FOOT: CPT | Mod: 26,RT,, | Performed by: RADIOLOGY

## 2025-08-19 RX ORDER — MELOXICAM 15 MG/1
15 TABLET ORAL DAILY
Qty: 30 TABLET | Refills: 0 | Status: SHIPPED | OUTPATIENT
Start: 2025-08-19